# Patient Record
Sex: FEMALE | Race: WHITE | NOT HISPANIC OR LATINO | Employment: OTHER | URBAN - NONMETROPOLITAN AREA
[De-identification: names, ages, dates, MRNs, and addresses within clinical notes are randomized per-mention and may not be internally consistent; named-entity substitution may affect disease eponyms.]

---

## 2018-02-15 ENCOUNTER — OFFICE VISIT (OUTPATIENT)
Dept: FAMILY MEDICINE CLINIC | Facility: CLINIC | Age: 63
End: 2018-02-15
Payer: COMMERCIAL

## 2018-02-15 VITALS
HEIGHT: 68 IN | RESPIRATION RATE: 18 BRPM | TEMPERATURE: 100 F | OXYGEN SATURATION: 97 % | SYSTOLIC BLOOD PRESSURE: 150 MMHG | HEART RATE: 78 BPM | BODY MASS INDEX: 38.98 KG/M2 | DIASTOLIC BLOOD PRESSURE: 90 MMHG | WEIGHT: 257.2 LBS

## 2018-02-15 DIAGNOSIS — M15.8 OTHER OSTEOARTHRITIS INVOLVING MULTIPLE JOINTS: ICD-10-CM

## 2018-02-15 DIAGNOSIS — H65.03 BILATERAL ACUTE SEROUS OTITIS MEDIA, RECURRENCE NOT SPECIFIED: ICD-10-CM

## 2018-02-15 DIAGNOSIS — Z00.00 HEALTHCARE MAINTENANCE: Primary | ICD-10-CM

## 2018-02-15 DIAGNOSIS — R73.9 HYPERGLYCEMIA: ICD-10-CM

## 2018-02-15 DIAGNOSIS — E55.9 VITAMIN D DEFICIENCY: ICD-10-CM

## 2018-02-15 DIAGNOSIS — E53.8 VITAMIN B12 DEFICIENCY: ICD-10-CM

## 2018-02-15 DIAGNOSIS — I10 ESSENTIAL HYPERTENSION: ICD-10-CM

## 2018-02-15 DIAGNOSIS — J30.2 CHRONIC SEASONAL ALLERGIC RHINITIS DUE TO OTHER ALLERGEN: ICD-10-CM

## 2018-02-15 DIAGNOSIS — E78.49 OTHER HYPERLIPIDEMIA: ICD-10-CM

## 2018-02-15 DIAGNOSIS — J01.00 ACUTE MAXILLARY SINUSITIS, RECURRENCE NOT SPECIFIED: ICD-10-CM

## 2018-02-15 DIAGNOSIS — E03.9 ACQUIRED HYPOTHYROIDISM: ICD-10-CM

## 2018-02-15 PROCEDURE — 99204 OFFICE O/P NEW MOD 45 MIN: CPT | Performed by: NURSE PRACTITIONER

## 2018-02-15 RX ORDER — AMOXICILLIN AND CLAVULANATE POTASSIUM 875; 125 MG/1; MG/1
1 TABLET, FILM COATED ORAL 2 TIMES DAILY
Qty: 20 TABLET | Refills: 1 | Status: SHIPPED | OUTPATIENT
Start: 2018-02-15 | End: 2018-02-15 | Stop reason: SDUPTHER

## 2018-02-15 RX ORDER — FLUTICASONE PROPIONATE 50 MCG
2 SPRAY, SUSPENSION (ML) NASAL DAILY PRN
Qty: 16 G | Refills: 5 | Status: SHIPPED | OUTPATIENT
Start: 2018-02-15 | End: 2018-02-15 | Stop reason: SDUPTHER

## 2018-02-15 RX ORDER — CALCIUM CARBONATE/VITAMIN D3 600 MG-10
1 TABLET ORAL 2 TIMES DAILY
Refills: 0 | COMMUNITY
Start: 2018-01-24

## 2018-02-15 RX ORDER — ATORVASTATIN CALCIUM 20 MG/1
20 TABLET, FILM COATED ORAL
Refills: 0 | COMMUNITY
Start: 2017-11-13 | End: 2018-02-15 | Stop reason: SDUPTHER

## 2018-02-15 RX ORDER — HYDROCHLOROTHIAZIDE 12.5 MG/1
12.5 CAPSULE, GELATIN COATED ORAL DAILY
Qty: 90 CAPSULE | Refills: 1 | Status: SHIPPED | OUTPATIENT
Start: 2018-02-15 | End: 2018-08-20 | Stop reason: SDUPTHER

## 2018-02-15 RX ORDER — ERGOCALCIFEROL 1.25 MG/1
1 CAPSULE ORAL
Refills: 0 | COMMUNITY
Start: 2018-01-24 | End: 2018-02-15 | Stop reason: SDUPTHER

## 2018-02-15 RX ORDER — ERGOCALCIFEROL 1.25 MG/1
50000 CAPSULE ORAL
Qty: 4 CAPSULE | Refills: 5 | Status: SHIPPED | OUTPATIENT
Start: 2018-02-15 | End: 2018-02-15 | Stop reason: SDUPTHER

## 2018-02-15 RX ORDER — IBUPROFEN 800 MG/1
800 TABLET ORAL EVERY 8 HOURS PRN
Qty: 270 TABLET | Refills: 1 | Status: SHIPPED | OUTPATIENT
Start: 2018-02-15 | End: 2018-08-20 | Stop reason: SDUPTHER

## 2018-02-15 RX ORDER — ASPIRIN 81 MG/1
81 TABLET ORAL DAILY
Qty: 90 TABLET | Refills: 1 | Status: SHIPPED | OUTPATIENT
Start: 2018-02-15 | End: 2018-08-20 | Stop reason: SDUPTHER

## 2018-02-15 RX ORDER — HYDROCHLOROTHIAZIDE 12.5 MG/1
12.5 CAPSULE, GELATIN COATED ORAL DAILY
Qty: 30 CAPSULE | Refills: 5 | Status: SHIPPED | OUTPATIENT
Start: 2018-02-15 | End: 2018-02-15 | Stop reason: SDUPTHER

## 2018-02-15 RX ORDER — CYANOCOBALAMIN (VITAMIN B-12) 1000 MCG
1000 TABLET ORAL EVERY OTHER DAY
Qty: 90 TABLET | Refills: 1 | Status: SHIPPED | OUTPATIENT
Start: 2018-02-15 | End: 2018-02-15 | Stop reason: SDUPTHER

## 2018-02-15 RX ORDER — ATORVASTATIN CALCIUM 20 MG/1
20 TABLET, FILM COATED ORAL
Qty: 90 TABLET | Refills: 1 | Status: SHIPPED | OUTPATIENT
Start: 2018-02-15 | End: 2018-02-15 | Stop reason: SDUPTHER

## 2018-02-15 RX ORDER — VERAPAMIL HYDROCHLORIDE 120 MG/1
120 CAPSULE, EXTENDED RELEASE ORAL DAILY
Qty: 30 CAPSULE | Refills: 5 | Status: SHIPPED | OUTPATIENT
Start: 2018-02-15 | End: 2018-02-15 | Stop reason: SDUPTHER

## 2018-02-15 RX ORDER — FLUTICASONE PROPIONATE 50 MCG
2 SPRAY, SUSPENSION (ML) NASAL DAILY PRN
Refills: 0 | COMMUNITY
Start: 2017-12-01 | End: 2018-02-15 | Stop reason: SDUPTHER

## 2018-02-15 RX ORDER — VERAPAMIL HYDROCHLORIDE 120 MG/1
1 CAPSULE, EXTENDED RELEASE ORAL DAILY
Refills: 0 | COMMUNITY
Start: 2018-02-04 | End: 2018-02-15 | Stop reason: SDUPTHER

## 2018-02-15 RX ORDER — LEVOTHYROXINE SODIUM 112 UG/1
1 TABLET ORAL DAILY
COMMUNITY
Start: 2017-11-16 | End: 2018-02-15 | Stop reason: SDUPTHER

## 2018-02-15 RX ORDER — IBUPROFEN 800 MG/1
800 TABLET ORAL EVERY 8 HOURS PRN
Qty: 270 TABLET | Refills: 1 | Status: SHIPPED | OUTPATIENT
Start: 2018-02-15 | End: 2018-02-15 | Stop reason: SDUPTHER

## 2018-02-15 RX ORDER — LEVOTHYROXINE SODIUM 112 UG/1
112 TABLET ORAL DAILY
Qty: 90 TABLET | Refills: 1 | Status: SHIPPED | OUTPATIENT
Start: 2018-02-15 | End: 2018-02-15 | Stop reason: SDUPTHER

## 2018-02-15 RX ORDER — CYANOCOBALAMIN (VITAMIN B-12) 1000 MCG
1 TABLET ORAL EVERY OTHER DAY
Refills: 0 | COMMUNITY
Start: 2018-02-04 | End: 2018-02-15 | Stop reason: SDUPTHER

## 2018-02-15 RX ORDER — FLUTICASONE PROPIONATE 50 MCG
2 SPRAY, SUSPENSION (ML) NASAL DAILY PRN
Qty: 16 G | Refills: 5 | Status: SHIPPED | OUTPATIENT
Start: 2018-02-15 | End: 2018-08-20 | Stop reason: SDUPTHER

## 2018-02-15 RX ORDER — AMOXICILLIN AND CLAVULANATE POTASSIUM 875; 125 MG/1; MG/1
1 TABLET, FILM COATED ORAL EVERY 12 HOURS SCHEDULED
Qty: 20 TABLET | Refills: 1 | Status: SHIPPED | OUTPATIENT
Start: 2018-02-15 | End: 2018-02-25

## 2018-02-15 RX ORDER — CYANOCOBALAMIN (VITAMIN B-12) 1000 MCG
1000 TABLET ORAL EVERY OTHER DAY
Qty: 90 TABLET | Refills: 1 | Status: SHIPPED | OUTPATIENT
Start: 2018-02-15 | End: 2018-08-20 | Stop reason: SDUPTHER

## 2018-02-15 RX ORDER — ERGOCALCIFEROL 1.25 MG/1
50000 CAPSULE ORAL
Qty: 12 CAPSULE | Refills: 1 | Status: SHIPPED | OUTPATIENT
Start: 2018-02-15 | End: 2018-08-20 | Stop reason: SDUPTHER

## 2018-02-15 RX ORDER — ATORVASTATIN CALCIUM 20 MG/1
20 TABLET, FILM COATED ORAL
Qty: 90 TABLET | Refills: 1 | Status: SHIPPED | OUTPATIENT
Start: 2018-02-15 | End: 2018-08-20 | Stop reason: SDUPTHER

## 2018-02-15 RX ORDER — VERAPAMIL HYDROCHLORIDE 120 MG/1
120 CAPSULE, EXTENDED RELEASE ORAL DAILY
Qty: 90 CAPSULE | Refills: 1 | Status: SHIPPED | OUTPATIENT
Start: 2018-02-15 | End: 2018-08-20 | Stop reason: SDUPTHER

## 2018-02-15 RX ORDER — LEVOTHYROXINE SODIUM 112 UG/1
112 TABLET ORAL DAILY
Qty: 90 TABLET | Refills: 1 | Status: SHIPPED | OUTPATIENT
Start: 2018-02-15 | End: 2018-08-17 | Stop reason: SDUPTHER

## 2018-02-15 RX ORDER — HYDROCHLOROTHIAZIDE 12.5 MG/1
1 CAPSULE, GELATIN COATED ORAL DAILY
Refills: 0 | COMMUNITY
Start: 2018-01-06 | End: 2018-02-15 | Stop reason: SDUPTHER

## 2018-02-15 RX ORDER — IBUPROFEN 800 MG/1
1 TABLET ORAL EVERY 8 HOURS PRN
Refills: 0 | COMMUNITY
Start: 2017-12-01 | End: 2018-02-15 | Stop reason: SDUPTHER

## 2018-02-15 RX ORDER — ASPIRIN 81 MG/1
81 TABLET ORAL DAILY
Qty: 30 TABLET | Refills: 0 | Status: SHIPPED | OUTPATIENT
Start: 2018-02-15 | End: 2018-02-15 | Stop reason: SDUPTHER

## 2018-02-15 NOTE — PATIENT INSTRUCTIONS
Low-Sodium Diet   AMBULATORY CARE:   A low-sodium diet  limits foods that are high in sodium (salt)  You will need to follow a low-sodium diet if you have high blood pressure, kidney disease, or heart failure  You may also need to follow this diet if you have a condition that is causing your body to retain (hold) extra fluid  You may need to limit the amount of sodium you eat to 1,500 mg  Ask your healthcare provider how much sodium you can have each day  How to use food labels to choose foods that are low in sodium:  Read food labels to find the amount of sodium they contain  The amount of sodium is listed in milligrams (mg)  The % Daily Value (DV) column tells you how much of your daily needs are met by 1 serving of the food for each nutrient listed  Choose foods that have less than 5% of the DV of sodium  These foods are considered low in sodium  Foods that have 20% or more of the DV of sodium are considered high in sodium  Some food labels may also list any of the following terms that tell you about the sodium content in the food:  · Sodium-free:  Less than 5 mg in each serving    · Very low sodium:  35 mg of sodium or less in each serving    · Low sodium:  140 mg of sodium or less in each serving    · Reduced sodium: At least 25% less sodium in each serving than the regular type    · Light in sodium:  50% less sodium in each serving    · Unsalted or no added salt:  No extra salt is added during processing (the food may still contain sodium)  Foods to avoid:  Salty foods are high in sodium   You should avoid the following:  · Processed foods:      ¨ Mixes for cornbread, biscuits, cake, and pudding     ¨ Instant foods, such as potatoes, cereals, noodles, and rice     ¨ Packaged foods, such as bread stuffing, rice and pasta mixes, snack dip mixes, and macaroni and cheese     ¨ Canned foods, such as canned vegetables, soups, broths, sauces, and vegetable or tomato juice    ¨ Snack foods, such as salted chips, popcorn, pretzels, pork rinds, salted crackers, and salted nuts    ¨ Frozen foods, such as dinners, entrees, vegetables with sauces, and breaded meats    ¨ Sauerkraut, pickled vegetables, and other foods prepared in brine    · Meats and cheeses:      ¨ Smoked or cured meat, such as corned beef, [place], ham, hot dogs, and sausage    ¨ Canned meats or spreads, such as potted meats, sardines, anchovies, and imitation seafood    ¨ Deli or lunch meats, such as bologna, ham, turkey, and roast beef    ¨ Processed cheese, such as American cheese and cheese spreads    · Condiments, sauces, and seasonings:      ¨ Salt (¼ teaspoon of salt contains 575 mg of sodium)    ¨ Seasonings made with salt, such as garlic salt, celery salt, onion salt, and seasoned salt    ¨ Regular soy sauce, barbecue sauce, teriyaki sauce, steak sauce, Worcestershire sauce, and most flavored vinegars    ¨ Canned gravy and mixes     ¨ Regular condiments, such as mustard, ketchup, and salad dressings    ¨ Pickles and olives    ¨ Meat tenderizers and monosodium glutamate (MSG)  Foods to include:  Read the food label to find the exact amount of sodium in each serving  · Bread and cereal:  Try to choose breads with less than 80 mg of sodium per serving  ¨ Bread, roll, taz, tortilla, or unsalted crackers  ¨ Ready-to-eat cereals with less than 5% DV of sodium (examples include shredded wheat and puffed rice)    ¨ Pasta    · Vegetables and fruits:      ¨ Unsalted fresh, frozen, or canned vegetables    ¨ Fresh, frozen, or canned fruits    ¨ Fruit juice    · Dairy:  One serving has about 150 mg of sodium  ¨ Milk, all types    ¨ Yogurt    ¨ Hard cheese, such as cheddar, Swiss, [employer], or mozzarella    · Meat and other protein foods:  Some raw meats may have added sodium       ¨ Plain meats, fish, and poultry     ¨ Eggs    · Other foods:      ¨ Homemade pudding    ¨ Unsalted nuts, popcorn, or pretzels    ¨ Unsalted butter or margarine  Ways to decrease sodium:   · Add spices and herbs to foods instead of salt during cooking  Use salt-free seasonings to add flavor to foods  Examples include onion powder, garlic powder, basil, prater powder, paprika, and parsley  Try lemon or lime juice or vinegar to give foods a tart flavor  Use hot peppers, pepper, or cayenne pepper to add a spicy flavor to foods  · Do not keep a salt shaker at your kitchen table  This may help keep you from adding salt to food at the table  It may take time to get used to enjoying the natural flavor of food instead of adding salt  Talk to your healthcare provider before you use salt substitutes  Some salt substitutes have a high amount of potassium and need to be avoided if you have kidney disease  · Choose low-sodium foods at restaurants  Meals from restaurants are often high in sodium  Some restaurants have nutrition information on the menu that tells you the amount of sodium in their foods  If possible, ask for your food to be prepared with less, or no salt  · Shop for unsalted or low-sodium foods and snacks at the grocery store  Examples include unsalted or low-sodium broths, soups, and canned vegetables  Choose fresh or frozen vegetables instead  Choose unsalted nuts or seeds or fresh fruits or vegetables as snacks  Read food labels and choose salt-free, very low-sodium, or low-sodium foods  © 2017 [employer] Information is for End User's use only and may not be sold, redistributed or otherwise used for commercial purposes  All illustrations and images included in CareNotes® are the copyrighted property of [employer]  or [employer]  The above information is an  only  It is not intended as medical advice for individual conditions or treatments  Talk to your doctor, nurse or pharmacist before following any medical regimen to see if it is safe and effective for you    Wellness Visit for Adults   AMBULATORY CARE:   A wellness visit  is when you see your healthcare provider to get screened for health problems  You can also get advice on how to stay healthy  Write down your questions so you remember to ask them  Ask your healthcare provider how often you should have a wellness visit  What happens at a wellness visit:  Your healthcare provider will ask about your health, and your family history of health problems  This includes high blood pressure, heart disease, and cancer  He or she will ask if you have symptoms that concern you, if you smoke, and about your mood  You may also be asked about your intake of medicines, supplements, food, and alcohol  Any of the following may be done:  · Your weight  will be checked  Your height may also be checked so your body mass index (BMI) can be calculated  Your BMI shows if you are at a healthy weight  · Your blood pressure  and heart rate will be checked  Your temperature may also be checked  · Blood and urine tests  may be done  Blood tests may be done to check your cholesterol levels  Abnormal cholesterol levels increase your risk for heart disease and stroke  You may also need a blood or urine test to check for diabetes if you are at increased risk  Urine tests may be done to look for signs of an infection or kidney disease  · A physical exam  includes checking your heartbeat and lungs with a stethoscope  Your healthcare provider may also check your skin to look for sun damage  · Screening tests  may be recommended  A screening test is done to check for diseases that may not cause symptoms  The screening tests you may need depend on your age, gender, family history, and lifestyle habits  For example, colorectal screening may be recommended if you are 48years old or older  Screening tests you need if you are a woman:   · A Pap smear  is used to screen for cervical cancer  Pap smears are usually done every 3 to 5 years depending on your age   You may need them more often if you have had abnormal Pap smear test results in the past  Ask your healthcare provider how often you should have a Pap smear  · A mammogram  is an x-ray of your breasts to screen for breast cancer  Experts recommend mammograms every 2 years starting at age 48 years  You may need a mammogram at age 52 years or younger if you have an increased risk for breast cancer  Talk to your healthcare provider about when you should start having mammograms and how often you need them  Vaccines you may need:   · Get an influenza vaccine  every year  The influenza vaccine protects you from the flu  Several types of viruses cause the flu  The viruses change over time, so new vaccines are made each year  · Get a tetanus-diphtheria (Td) booster vaccine  every 10 years  This vaccine protects you against tetanus and diphtheria  Tetanus is a severe infection that may cause painful muscle spasms and lockjaw  Diphtheria is a severe bacterial infection that causes a thick covering in the back of your mouth and throat  · Get a human papillomavirus (HPV) vaccine  if you are female and aged 23 to 32 or male 23 to 24 and never received it  This vaccine protects you from HPV infection  HPV is the most common infection spread by sexual contact  HPV may also cause vaginal, penile, and anal cancers  · Get a pneumococcal vaccine  if you are aged 72 years or older  The pneumococcal vaccine is an injection given to protect you from pneumococcal disease  Pneumococcal disease is an infection caused by pneumococcal bacteria  The infection may cause pneumonia, meningitis, or an ear infection  · Get a shingles vaccine  if you are aged 61 or older, even if you have had shingles before  The shingles vaccine is an injection to protect you from the varicella-zoster virus  This is the same virus that causes chickenpox  Shingles is a painful rash that develops in people who had chickenpox or have been exposed to the virus    How to eat healthy:  My Plate is a model for planning healthy meals  It shows the types and amounts of foods that should go on your plate  Fruits and vegetables make up about half of your plate, and grains and protein make up the other half  A serving of dairy is included on the side of your plate  The amount of calories and serving sizes you need depends on your age, gender, weight, and height  Examples of healthy foods are listed below:  · Eat a variety of vegetables  such as dark green, red, and orange vegetables  You can also include canned vegetables low in sodium (salt) and frozen vegetables without added butter or sauces  · Eat a variety of fresh fruits , canned fruit in 100% juice, frozen fruit, and dried fruit  · Include whole grains  At least half of the grains you eat should be whole grains  Examples include whole-wheat bread, wheat pasta, brown rice, and whole-grain cereals such as oatmeal     · Eat a variety of protein foods such as seafood (fish and shellfish), lean meat, and poultry without skin (turkey and chicken)  Examples of lean meats include pork leg, shoulder, or tenderloin, and beef round, sirloin, tenderloin, and extra lean ground beef  Other protein foods include eggs and egg substitutes, beans, peas, soy products, nuts, and seeds  · Choose low-fat dairy products such as skim or 1% milk or low-fat yogurt, cheese, and cottage cheese  · Limit unhealthy fats  such as butter, hard margarine, and shortening  Exercise:  Exercise at least 30 minutes per day on most days of the week  Some examples of exercise include walking, biking, dancing, and swimming  You can also fit in more physical activity by taking the stairs instead of the elevator or parking farther away from stores  Include muscle strengthening activities 2 days each week  Regular exercise provides many health benefits   It helps you manage your weight, and decreases your risk for type 2 diabetes, heart disease, stroke, and high blood pressure  Exercise can also help improve your mood  Ask your healthcare provider about the best exercise plan for you  General health and safety guidelines:   · Do not smoke  Nicotine and other chemicals in cigarettes and cigars can cause lung damage  Ask your healthcare provider for information if you currently smoke and need help to quit  E-cigarettes or smokeless tobacco still contain nicotine  Talk to your healthcare provider before you use these products  · Limit alcohol  A drink of alcohol is 12 ounces of beer, 5 ounces of wine, or 1½ ounces of liquor  · Lose weight, if needed  Being overweight increases your risk of certain health conditions  These include heart disease, high blood pressure, type 2 diabetes, and certain types of cancer  · Protect your skin  Do not sunbathe or use tanning beds  Use sunscreen with a SPF 15 or higher  Apply sunscreen at least 15 minutes before you go outside  Reapply sunscreen every 2 hours  Wear protective clothing, hats, and sunglasses when you are outside  · Drive safely  Always wear your seatbelt  Make sure everyone in your car wears a seatbelt  A seatbelt can save your life if you are in an accident  Do not use your cell phone when you are driving  This could distract you and cause an accident  Pull over if you need to make a call or send a text message  · Practice safe sex  Use latex condoms if are sexually active and have more than one partner  Your healthcare provider may recommend screening tests for sexually transmitted infections (STIs)  · Wear helmets, lifejackets, and protective gear  Always wear a helmet when you ride a bike or motorcycle, go skiing, or play sports that could cause a head injury  Wear protective equipment when you play sports  Wear a lifejacket when you are on a boat or doing water sports    © 2017 [employer] Information is for End User's use only and may not be sold, redistributed or otherwise used for commercial purposes  All illustrations and images included in CareNotes® are the copyrighted property of [email or web address] , [employer]  or [facility]  The above information is an  only  It is not intended as medical advice for individual conditions or treatments  Talk to your doctor, nurse or pharmacist before following any medical regimen to see if it is safe and effective for you

## 2018-02-15 NOTE — PROGRESS NOTES
Assessment/Plan:      Diagnoses and all orders for this visit:    Healthcare maintenance  -     aspirin (ECOTRIN LOW STRENGTH) 81 mg EC tablet; Take 1 tablet (81 mg total) by mouth daily  -     Coenzyme Q10 (COQ-10) 200 MG CAPS; Take 1 capsule (200 mg total) by mouth daily    Essential hypertension  -     verapamil (VERELAN PM) 120 MG 24 hr capsule; Take 1 capsule (120 mg total) by mouth daily  -     hydrochlorothiazide (MICROZIDE) 12 5 mg capsule; Take 1 capsule (12 5 mg total) by mouth daily    Acquired hypothyroidism  -     levothyroxine 112 mcg tablet; Take 1 tablet (112 mcg total) by mouth daily  -     CBC and differential; Future  -     TSH, 3rd generation with T4 reflex; Future    Other hyperlipidemia  -     atorvastatin (LIPITOR) 20 mg tablet; Take 1 tablet (20 mg total) by mouth daily at bedtime  -     Lipid panel; Future    Vitamin D deficiency  -     ergocalciferol (VITAMIN D2) 50,000 units; Take 1 capsule (50,000 Units total) by mouth every 14 (fourteen) days  -     Vitamin D Panel; Future    Vitamin B12 deficiency  -     KP VITAMIN B-12 1000 MCG tablet; Take 1 tablet (1,000 mcg total) by mouth every other day  -     Vitamin B12; Future    Acute maxillary sinusitis, recurrence not specified  -     amoxicillin-clavulanate (AUGMENTIN) 875-125 mg per tablet; Take 1 tablet by mouth 2 (two) times a day for 10 days  -     fluticasone (FLONASE) 50 mcg/act nasal spray; 2 sprays into each nostril daily as needed for rhinitis or allergies    Bilateral acute serous otitis media, recurrence not specified  -     amoxicillin-clavulanate (AUGMENTIN) 875-125 mg per tablet; Take 1 tablet by mouth 2 (two) times a day for 10 days    Other osteoarthritis involving multiple joints  -     ibuprofen (MOTRIN) 800 mg tablet;  Take 1 tablet (800 mg total) by mouth every 8 (eight) hours as needed for mild pain, moderate pain or headaches    Chronic seasonal allergic rhinitis due to other allergen  -     fluticasone (FLONASE) 50 mcg/act nasal spray; 2 sprays into each nostril daily as needed for rhinitis or allergies    Hyperglycemia  -     CBC and differential; Future  -     Comprehensive metabolic panel; Future  -     Hemoglobin A1c; Future  -     Insulin, fasting; Future    Other orders  -     Discontinue: atorvastatin (LIPITOR) 20 mg tablet; Take 20 mg by mouth daily at bedtime  -     RA CALCIUM 600/VITAMIN D-3 600-400 MG-UNIT TABS; Take 1 tablet by mouth 2 (two) times a day  -     Discontinue:  VITAMIN B-12 1000 MCG tablet; Take 1 tablet by mouth every other day  -     Discontinue: ergocalciferol (VITAMIN D2) 50,000 units; Take 1 capsule by mouth every 14 (fourteen) days  -     Discontinue: fluticasone (FLONASE) 50 mcg/act nasal spray; 2 sprays into each nostril daily as needed  -     Discontinue: hydrochlorothiazide (MICROZIDE) 12 5 mg capsule; Take 1 capsule by mouth daily  -     Discontinue: ibuprofen (MOTRIN) 800 mg tablet; Take 1 tablet by mouth every 8 (eight) hours as needed  -     Discontinue: levothyroxine 112 mcg tablet; Take 1 tablet by mouth daily  -     Discontinue: verapamil (VERELAN PM) 120 MG 24 hr capsule; Take 1 capsule by mouth daily          Subjective:     Patient ID: Kalpana Brown is a 58 y o  female  Patient presents to office for initial physical exam and to establish care as a new patient at [address] [place] [address]  All medications and health history reviewed with patient and tolerating medications well  C/O pain in B/L ears feeling blocked along with sinus congestion for yellow mucous along with productive cough for yellow mucous  Denies any fever or body aches  Patient continues to be followed by Podiatry Dr Danette Carballo for chronic left heel pain which she received a steroid injection for and it relieved her pain so continues to wear compression sock  Review of Systems    GENERAL:  Feels well, denies any significant changes in weight without trying    SKIN:  Denies rashes, lesions, opened areas, wounds, change in moles or any other skin changes  HEENT:  Denies any head injury or headaches  Negative blurred vision, floaters, spots before eyes, infections, or other vision problems  Negative significant changes in vision or hearing  Negative tinnitus, vertigo, or infections  Hx of hay fever symptoms, C/O B/L ear pain with fullness sensation  C/O sinus congestion with yellow nasal discharge, no bloody noses, or problems with smell  Negative sore throat, bleeding gums, ulcers, or sores  Glasses/Contacts: Glasses  Hearing Aids: NO  Dentures/Partials/Implants:   Lower Right Implant and Capped Teeth  NECK:  Denies lumps, goiter, pain, swollen glands, or lymphadenopathy  BREASTS:  Denies lumps, pain, nipple discharge, swelling, redness, or any other changes  RESPIRATORY:  C/O productive cough for yellow mucous, no wheezing, shortness of breath, dyspnea, or orthopnea  CARDIOVASCULAR:  Denies chest pain or palpitations  GASTROINTESTINAL:  Appetite good, denies nausea, vomiting, or indigestion  Bowel movements normal occurring about once daily or every other day  URINARY:  Denies frequency, urgency, incontinence, dysuria, hematuria, nocturia, or recent flank pain  GENITAL:  Denies vaginal discharge, pelvic infection, lesions, ulcers, or pain  Negative dyspareunia or abnormal vaginal bleeding  PERIPHERAL VASCULAR:  Denies varicosities, swelling, skin changes, or pain  MUSCULOSKELETAL:  Denies back, joint, or muscle pain  Negative problems with mobility or movement  PSYCHIATRIC:  Denies problems with depression, anxiety, anger, or other psychiatric symptoms  NEUROLOGIC:  Denies fainting, dizziness, memory problems, seizures, tingling, motor or sensory loss  HEMATOLOGIC:  Denies easy bruising, bleeding, or anemia  ENDOCRINE:  Denies thyroid problems, temperature intolerance, excessive sweating, or other endocrine symptoms       Objective:     Physical Exam   Nursing note and vitals reviewed  GENERAL:  Appears well nourished, well groomed, in no acute distress  SKIN:  Palms warm, dry, color good  Nails without clubbing or cyanosis  No lesions, ulcerations, or wounds  HEAD:  Hair is average texture  Scalp without lesions, normocephalic, and atraumatic  EYES:  Visual fields full by confrontation  Conjunctiva pink, sclera white, PERRLA  Extraocular movements intact  Disc margins sharp, without hemorrhages or exudate  No arteriolar narrowing or A-V nicking  EARS:  B/L ear canals clear  B/L TMs red with serous effusion and decreased light reflex  Acuity good to whispered voice  Green midline  AC>BC  NOSE: Mucosa pink, moist, septum midline  Negative sinus tenderness  B/L turbinates red and edematous with yellow exudate  MOUTH:  Oral mucosa pink  Pharynx pink, moist, without swelling, redness, or exudate  Dentition ok  Tongue midline  NECK:  Supple, trachea midline, Negative thyromegaly, lymphadenopathy, or swollen glands  LYMPH NODES:  Negative enlargement of neck, axillary, epitrochlear, or inguinal nodes  THORAX/LUNGS  Thorax symmetric with good excursion  Lungs resonant  Breath sounds vesicular with no added sounds  Diaphragm descends within normal limits  CARDIOVASCULAR:  Carotid upstrokes brisk and without bruits  Apical impulse discrete and tapping, barely palpable in the 5th ICS/MCL  Normal S1 and Normal S2, Negative S3 or S4  Negative murmurs, thrills, lifts, or heaves  ABDOMEN:  Protuberant, bowel sounds normal active x 4 quadrants  Negative tenderness  Negative masses  Negative hepatomegaly  Negative splenomegaly  Negative costovertebral tenderness  EXTREMITIES:  Warm, calves supple, non-tender, negative for edema  Negative stasis pigmentation or ulcers  +2 pulses throughout  MUSCULOSKELETAL:  Negative joint deformities      Good range of motion in hands, wrists, elbows, shoulders, spine, hips, knees, and ankles  Negative spinal curvature  NEUROLOGICAL:  Mental status:  Awake, alert, and oriented to person, place, time, and event  Normal thought processes  Cranial Nerves:  II-XII intact  Motor:  Good muscle bulk and tone  Strength: 5/5 throughout  Cerebellar:  Rapid alternating movements, point-to-point movements intact  Gait stable and fluid  Sensory:  Pinprick, light touch, position sense, vibration, and stereogenesis intact  Romberg: Negative  Reflexes: +2 throughout

## 2018-04-02 LAB — HBA1C MFR BLD HPLC: 5.4 %

## 2018-05-24 ENCOUNTER — OFFICE VISIT (OUTPATIENT)
Dept: FAMILY MEDICINE CLINIC | Facility: CLINIC | Age: 63
End: 2018-05-24
Payer: COMMERCIAL

## 2018-05-24 ENCOUNTER — TELEPHONE (OUTPATIENT)
Dept: FAMILY MEDICINE CLINIC | Facility: CLINIC | Age: 63
End: 2018-05-24

## 2018-05-24 VITALS
OXYGEN SATURATION: 97 % | WEIGHT: 258.8 LBS | BODY MASS INDEX: 39.22 KG/M2 | HEIGHT: 68 IN | RESPIRATION RATE: 18 BRPM | DIASTOLIC BLOOD PRESSURE: 74 MMHG | SYSTOLIC BLOOD PRESSURE: 126 MMHG | HEART RATE: 68 BPM | TEMPERATURE: 98.8 F

## 2018-05-24 DIAGNOSIS — I10 ESSENTIAL HYPERTENSION: ICD-10-CM

## 2018-05-24 DIAGNOSIS — R05.9 COUGH: ICD-10-CM

## 2018-05-24 DIAGNOSIS — B37.2 CANDIDIASIS OF SKIN: ICD-10-CM

## 2018-05-24 DIAGNOSIS — J18.9 PNEUMONIA OF LEFT LOWER LOBE DUE TO INFECTIOUS ORGANISM: ICD-10-CM

## 2018-05-24 DIAGNOSIS — J18.9 PNEUMONIA OF LEFT LOWER LOBE DUE TO INFECTIOUS ORGANISM: Primary | ICD-10-CM

## 2018-05-24 DIAGNOSIS — E03.9 ACQUIRED HYPOTHYROIDISM: ICD-10-CM

## 2018-05-24 PROCEDURE — 99214 OFFICE O/P EST MOD 30 MIN: CPT | Performed by: NURSE PRACTITIONER

## 2018-05-24 PROCEDURE — 3078F DIAST BP <80 MM HG: CPT | Performed by: NURSE PRACTITIONER

## 2018-05-24 PROCEDURE — 3074F SYST BP LT 130 MM HG: CPT | Performed by: NURSE PRACTITIONER

## 2018-05-24 RX ORDER — KETOCONAZOLE 20 MG/G
CREAM TOPICAL 2 TIMES DAILY PRN
Qty: 60 G | Refills: 3 | Status: SHIPPED | OUTPATIENT
Start: 2018-05-24 | End: 2018-05-24 | Stop reason: SDUPTHER

## 2018-05-24 RX ORDER — ALBUTEROL SULFATE 90 UG/1
AEROSOL, METERED RESPIRATORY (INHALATION)
Qty: 1 INHALER | Refills: 1 | Status: SHIPPED | OUTPATIENT
Start: 2018-05-24 | End: 2018-08-28 | Stop reason: ALTCHOICE

## 2018-05-24 RX ORDER — CLINDAMYCIN HYDROCHLORIDE 300 MG/1
300 CAPSULE ORAL 4 TIMES DAILY
Qty: 40 CAPSULE | Refills: 1 | Status: SHIPPED | OUTPATIENT
Start: 2018-05-24 | End: 2018-06-04 | Stop reason: ALTCHOICE

## 2018-05-24 RX ORDER — METHYLPREDNISOLONE 4 MG/1
TABLET ORAL
Qty: 21 TABLET | Refills: 1 | Status: SHIPPED | OUTPATIENT
Start: 2018-05-24 | End: 2018-06-04 | Stop reason: ALTCHOICE

## 2018-05-24 RX ORDER — PROPRANOLOL/HYDROCHLOROTHIAZID 40 MG-25MG
2 TABLET ORAL DAILY
COMMUNITY
End: 2021-07-19 | Stop reason: ALTCHOICE

## 2018-05-24 RX ORDER — KETOCONAZOLE 20 MG/G
CREAM TOPICAL 2 TIMES DAILY PRN
Qty: 60 G | Refills: 5 | Status: SHIPPED | OUTPATIENT
Start: 2018-05-24 | End: 2018-08-20 | Stop reason: SDUPTHER

## 2018-05-24 RX ORDER — KETOCONAZOLE 20 MG/G
CREAM TOPICAL 2 TIMES DAILY PRN
Qty: 60 G | Refills: 5 | Status: SHIPPED | OUTPATIENT
Start: 2018-05-24 | End: 2018-05-24 | Stop reason: SDUPTHER

## 2018-05-24 RX ORDER — CLINDAMYCIN HYDROCHLORIDE 300 MG/1
300 CAPSULE ORAL 4 TIMES DAILY
Qty: 40 CAPSULE | Refills: 1 | Status: SHIPPED | OUTPATIENT
Start: 2018-05-24 | End: 2018-05-24 | Stop reason: SDUPTHER

## 2018-05-24 RX ORDER — METHYLPREDNISOLONE 4 MG/1
TABLET ORAL
Qty: 21 TABLET | Refills: 1 | Status: SHIPPED | OUTPATIENT
Start: 2018-05-24 | End: 2018-05-24 | Stop reason: SDUPTHER

## 2018-05-24 RX ORDER — ALBUTEROL SULFATE 90 UG/1
AEROSOL, METERED RESPIRATORY (INHALATION)
Qty: 1 INHALER | Refills: 1 | Status: SHIPPED | OUTPATIENT
Start: 2018-05-24 | End: 2018-05-24 | Stop reason: SDUPTHER

## 2018-05-24 RX ORDER — CEFTRIAXONE 500 MG/1
500 INJECTION, POWDER, FOR SOLUTION INTRAMUSCULAR; INTRAVENOUS ONCE
Status: COMPLETED | OUTPATIENT
Start: 2018-05-24 | End: 2018-05-24

## 2018-05-24 RX ORDER — FLUCONAZOLE 150 MG/1
TABLET ORAL
Qty: 2 TABLET | Refills: 1 | Status: SHIPPED | OUTPATIENT
Start: 2018-05-24 | End: 2018-05-31

## 2018-05-24 RX ORDER — FLUCONAZOLE 150 MG/1
TABLET ORAL
Qty: 2 TABLET | Refills: 1 | Status: SHIPPED | OUTPATIENT
Start: 2018-05-24 | End: 2018-05-24 | Stop reason: SDUPTHER

## 2018-05-24 RX ORDER — FLUCONAZOLE 150 MG/1
TABLET ORAL
Qty: 2 TABLET | Refills: 2 | Status: SHIPPED | OUTPATIENT
Start: 2018-05-24 | End: 2018-05-24 | Stop reason: SDUPTHER

## 2018-05-24 RX ORDER — CHOLECALCIFEROL (VITAMIN D3) 125 MCG
2 CAPSULE ORAL DAILY
Qty: 60 CAPSULE | Refills: 0 | Status: SHIPPED | OUTPATIENT
Start: 2018-05-24 | End: 2018-06-23

## 2018-05-24 RX ORDER — ALBUTEROL SULFATE 90 UG/1
AEROSOL, METERED RESPIRATORY (INHALATION)
Qty: 1 INHALER | Refills: 2 | Status: SHIPPED | OUTPATIENT
Start: 2018-05-24 | End: 2018-05-24 | Stop reason: SDUPTHER

## 2018-05-24 RX ADMIN — CEFTRIAXONE 500 MG: 500 INJECTION, POWDER, FOR SOLUTION INTRAMUSCULAR; INTRAVENOUS at 16:01

## 2018-05-24 NOTE — PROGRESS NOTES
Assessment/Plan:      Diagnoses and all orders for this visit:    Pneumonia of left lower lobe due to infectious organism ([address] )  -     XR chest pa & lateral; Future  -     Discontinue: Methylprednisolone 4 MG TBPK; Use as directed on package  -     Discontinue: clindamycin (CLEOCIN) 300 MG capsule; Take 1 capsule (300 mg total) by mouth 4 (four) times a day for 10 days  -     Lactobacillus (PROBIOTIC ACIDOPHILUS) CAPS; Take 2 capsules by mouth daily for 30 days  -     Discontinue: albuterol (VENTOLIN HFA) 90 mcg/act inhaler; 2 puffs INH every 8 hours x 10 days then prn SOB  -     Discontinue: Mometasone Furo-Formoterol Fum (DULERA) 200-5 MCG/ACT AERO; Inhale 2 puffs 2 (two) times a day for 14 days  -     cefTRIAXone (ROCEPHIN) injection 500 mg; Inject 500 mg into the shoulder, thigh, or buttocks once   -     Methylprednisolone 4 MG TBPK; Use as directed on package  -     clindamycin (CLEOCIN) 300 MG capsule; Take 1 capsule (300 mg total) by mouth 4 (four) times a day for 10 days  -     albuterol (VENTOLIN HFA) 90 mcg/act inhaler; 2 puffs INH every 8 hours x 10 days then prn SOB  -     Mometasone Furo-Formoterol Fum (DULERA) 200-5 MCG/ACT AERO; Inhale 2 puffs 2 (two) times a day for 14 days    Cough  -     XR chest pa & lateral; Future  -     Discontinue: Methylprednisolone 4 MG TBPK; Use as directed on package  -     Discontinue: clindamycin (CLEOCIN) 300 MG capsule; Take 1 capsule (300 mg total) by mouth 4 (four) times a day for 10 days  -     Lactobacillus (PROBIOTIC ACIDOPHILUS) CAPS; Take 2 capsules by mouth daily for 30 days  -     Discontinue: albuterol (VENTOLIN HFA) 90 mcg/act inhaler; 2 puffs INH every 8 hours x 10 days then prn SOB  -     Discontinue: Mometasone Furo-Formoterol Fum (DULERA) 200-5 MCG/ACT AERO; Inhale 2 puffs 2 (two) times a day for 14 days  -     Methylprednisolone 4 MG TBPK; Use as directed on package  -     clindamycin (CLEOCIN) 300 MG capsule;  Take 1 capsule (300 mg total) by mouth 4 (four) times a day for 10 days  -     albuterol (VENTOLIN HFA) 90 mcg/act inhaler; 2 puffs INH every 8 hours x 10 days then prn SOB  -     Mometasone Furo-Formoterol Fum (DULERA) 200-5 MCG/ACT AERO; Inhale 2 puffs 2 (two) times a day for 14 days    Candidiasis of skin  -     Discontinue: ketoconazole (NIZORAL) 2 % cream; Apply topically 2 (two) times a day as needed for itching, irritation or rash  -     Discontinue: fluconazole (DIFLUCAN) 150 mg tablet; 1 tab po today and repeat every 3 days for 2 doses  -     ketoconazole (NIZORAL) 2 % cream; Apply topically 2 (two) times a day as needed for itching, irritation or rash  -     fluconazole (DIFLUCAN) 150 mg tablet; 1 tab po today and repeat every 3 days for 2 doses    Essential hypertension    Acquired hypothyroidism    Other orders  -     Discontinue: Turmeric 1053 MG TABS; Take by mouth  -     Turmeric 500 MG CAPS; Take 2 capsules by mouth daily          Subjective:     Patient ID: Kaycee Virk is a 58 y o  female  Patient presents to office with C/O has a productive cough for yellow mucous, chest burning, pain in the ribs, nasal congestion for yellow mucous, PND, and B/L ears feeling blocked ongoing for past week  Patient has used Flonase NS but has not worked  Patient C/O having headaches for the past week  Denies fever, chills, or any other symptoms  Complete medical history and medications reviewed with patient and tolerating all medications well without any problems  Patient is requesting a Rx for Ketoconazole Cream for her fungal infection under her abdominal fold  Patient is requesting a Rx for Diflucan to have on hand since she will be taking Medrol Dose pack and Clindamycin in the event she develops a vaginal yeast infection  Review of Systems    GENERAL:  Feels well, denies any significant changes in weight without trying  SKIN:  Patient is requesting a Rx for Ketoconazole Cream for her fungal infection under her abdominal fold  Patient is requesting a Rx for Diflucan to have on hand since she will be taking Medrol Dose pack and Clindamycin in the event she develops a vaginal yeast infection  Denies rashes, lesions, opened areas, wounds, change in moles or any other skin changes  HEENT:  Denies any head injury or headaches  Negative blurred vision, floaters, spots before eyes, infections, or other vision problems  Negative significant changes in vision or hearing  Negative tinnitus, vertigo, or infections  Hx of hay fever symptoms, C/O B/L ear pain with fullness sensation  C/O sinus congestion with yellow nasal discharge, no bloody noses, or problems with smell  C/O PND, Negative sore throat, bleeding gums, ulcers, or sores  Glasses/Contacts: Glasses  Hearing Aids: NO  Dentures/Partials/Implants:   Lower Right Implant and Capped Teeth  NECK:  Denies lumps, goiter, pain, swollen glands, or lymphadenopathy  BREASTS:  Denies lumps, pain, nipple discharge, swelling, redness, or any other changes  RESPIRATORY:  C/O productive cough for yellow mucous, no wheezing, shortness of breath, dyspnea, or orthopnea  C/O pain in ribs from coughing  CARDIOVASCULAR:  Denies chest pain or palpitations  GASTROINTESTINAL:  Appetite good, denies nausea, vomiting, or indigestion  Bowel movements normal occurring about once daily or every other day  URINARY:  Denies frequency, urgency, incontinence, dysuria, hematuria, nocturia, or recent flank pain  GENITAL:  Denies vaginal discharge, pelvic infection, lesions, ulcers, or pain  Negative dyspareunia or abnormal vaginal bleeding  PERIPHERAL VASCULAR:  Denies varicosities, swelling, skin changes, or pain  MUSCULOSKELETAL:  Denies back, joint, or muscle pain  Negative problems with mobility or movement  PSYCHIATRIC:  Denies problems with depression, anxiety, anger, or other psychiatric symptoms    NEUROLOGIC:  Denies fainting, dizziness, memory problems, seizures, tingling, motor or sensory loss  HEMATOLOGIC:  Denies easy bruising, bleeding, or anemia  ENDOCRINE:  Denies thyroid problems, temperature intolerance, excessive sweating, or other endocrine symptoms       Objective:     Physical Exam   Nursing note and vitals reviewed  GENERAL:  Appears well nourished, well groomed, in no acute distress  SKIN:  Palms warm, dry, color good  Nails without clubbing or cyanosis  No lesions, ulcerations, or wounds  HEAD:  Hair is average texture  Scalp without lesions, normocephalic, and atraumatic  EYES:  Visual fields full by confrontation  Conjunctiva pink, sclera white, PERRLA  Extraocular movements intact  Disc margins sharp, without hemorrhages or exudate  No arteriolar narrowing or A-V nicking  EARS:  B/L ear canals clear  B/L TMs red with serous effusion and decreased light reflex  Acuity good to whispered voice  Green midline  AC>BC  NOSE: Mucosa pink, moist, septum midline  + sinus tenderness  B/L turbinates red and edematous with yellow exudate  MOUTH:  Oral mucosa pink  Pharynx red with PND  Dentition ok  Tongue midline  NECK:  Supple, trachea midline, Negative thyromegaly, lymphadenopathy, or swollen glands  LYMPH NODES:  Negative enlargement of neck, axillary, epitrochlear, or inguinal nodes  THORAX/LUNGS  Thorax symmetric with good excursion  Lungs resonant  Breath sounds wheezing, diminished, and crackles of left lower base  Diaphragm descends within normal limits  CARDIOVASCULAR:  Carotid upstrokes brisk and without bruits  Apical impulse discrete and tapping, barely palpable in the 5th ICS/MCL  Normal S1 and Normal S2, Negative S3 or S4  Negative murmurs, thrills, lifts, or heaves  ABDOMEN:  Protuberant, bowel sounds normal active x 4 quadrants  Negative tenderness  Negative masses  Negative hepatomegaly  Negative splenomegaly  Negative costovertebral tenderness    EXTREMITIES:  Warm, calves supple, non-tender, negative for edema     Negative stasis pigmentation or ulcers  +2 pulses throughout  MUSCULOSKELETAL:  Negative joint deformities  Good range of motion in hands, wrists, elbows, shoulders, spine, hips, knees, and ankles  Negative spinal curvature  NEUROLOGICAL:  Mental status:  Awake, alert, and oriented to person, place, time, and event  Normal thought processes  Cranial Nerves:  II-XII intact  Motor:  Good muscle bulk and tone  Strength: 5/5 throughout  Cerebellar:  Rapid alternating movements, point-to-point movements intact  Gait stable and fluid  Sensory:  Pinprick, light touch, position sense, vibration, and stereogenesis intact      Romberg: Negative  Reflexes: +2 throughout

## 2018-05-24 NOTE — PATIENT INSTRUCTIONS
Bacterial Pneumonia   AMBULATORY CARE:   Bacterial pneumonia  is a lung infection caused by bacteria  Your lungs become inflamed and cannot work well  Bacterial pneumonia germs are easily spread when an infected person coughs, sneezes, or has close contact with others  Common symptoms include the following:   · Dry cough or coughing up mucus, which may be streaked with blood    · Fever or chills    · Shortness of breath, wheezing, or chest pain    · Feeling tired easily    · Fast heartbeat    · Headache, muscle pain, or abdominal pain or discomfort    · Trouble thinking clearly  Seek care immediately if:   · You are confused and cannot think clearly  · You are urinating less or not at all  · You cough up blood  · You have more trouble breathing, or your breathing seems faster than normal     · Your heart or pulse beats more than 100 times in 1 minute  · Your lips or fingernails turn blue  Contact your healthcare provider if:   · Your symptoms are the same or get worse 48 hours after you start antibiotics  · You cannot eat or have loss of appetite, nausea, or are vomiting  · You have questions or concerns about your condition or care  Treatment  depends on what caused your bacterial pneumonia and how bad your symptoms are  You may need any of the following:  · Antibiotics  help treat a bacterial infection  · Acetaminophen  decreases pain and fever  It is available without a doctor's order  Ask how much to take and how often to take it  Follow directions  Read the labels of all other medicines you are using to see if they also contain acetaminophen, or ask your doctor or pharmacist  Acetaminophen can cause liver damage if not taken correctly  Do not use more than 4 grams (4,000 milligrams) total of acetaminophen in one day  · NSAIDs , such as ibuprofen, help decrease swelling, pain, and fever  This medicine is available with or without a doctor's order   NSAIDs can cause stomach bleeding or kidney problems in certain people  If you take blood thinner medicine, always ask your healthcare provider if NSAIDs are safe for you  Always read the medicine label and follow directions  · Airway clearance techniques  are exercises to help remove mucus so you can breathe more easily  Your healthcare provider will show you how to do the exercises  These exercises may be used along with machines or devices to help decrease your symptoms  · Respiratory support  is given to help you breathe  You may receive oxygen to increase the level of oxygen in your blood  You may also need a machine to help you breathe  Manage your symptoms:   · Rest as needed  Rest often while you recover  Slowly start to do more each day  · Drink liquids as directed  Ask how much liquid to drink each day and which liquids are best for you  Liquids help thin your mucus, which may make it easier for you to cough it up  · Do not smoke  Avoid secondhand smoke  Smoking increases your risk for pneumonia  Smoking also makes it harder for you to get better after you have had pneumonia  Ask your healthcare provider for information if you currently smoke and need help to quit  E-cigarettes or smokeless tobacco still contain nicotine  Talk to your healthcare provider before you use these products  · Use a cool mist humidifier  to increase air moisture in your home  This may make it easier for you to breathe and help decrease your cough  · Keep your head elevated  You may be able to breathe better if you lie down with the head of your bed up  Prevent bacterial pneumonia:   · Prevent the spread of germs  Wash your hands often with soap and water  Use gel hand cleanser when there is no soap and water available  Do not touch your eyes, nose, or mouth unless you have washed your hands first  Cover your mouth when you cough  Cough into a tissue or your shirtsleeve so you do not spread germs from your hands   If you are sick, stay away from others as much as possible  · Limit alcohol  Women should limit alcohol to 1 drink a day  Men should limit alcohol to 2 drinks a day  A drink of alcohol is 12 ounces of beer, 5 ounces of wine, or 1½ ounces of liquor  · Ask about vaccines  You may need a vaccine to help prevent pneumonia  Get an influenza (flu) vaccine every year as soon as it becomes available  Follow up with your healthcare provider as directed:  Write down your questions so you remember to ask them during your visits  © 2017 [facility] Information is for End User's use only and may not be sold, redistributed or otherwise used for commercial purposes  All illustrations and images included in CareNotes® are the copyrighted property of [employer] M [employer]  or [employer]  The above information is an  only  It is not intended as medical advice for individual conditions or treatments  Talk to your doctor, nurse or pharmacist before following any medical regimen to see if it is safe and effective for you  Wellness Visit for Adults   WHAT YOU NEED TO KNOW:   What is a wellness visit? A wellness visit is when you see your healthcare provider to get screened for health problems  You can also get advice on how to stay healthy  Write down your questions so you remember to ask them  Ask your healthcare provider how often you should have a wellness visit  What happens at a wellness visit? Your healthcare provider will ask about your health, and your family history of health problems  This includes high blood pressure, heart disease, and cancer  He or she will ask if you have symptoms that concern you, if you smoke, and about your mood  You may also be asked about your intake of medicines, supplements, food, and alcohol  Any of the following may be done:  · Your weight  will be checked  Your height may also be checked so your body mass index (BMI) can be calculated   Your BMI shows if you are at a healthy weight  · Your blood pressure  and heart rate will be checked  Your temperature may also be checked  · Blood and urine tests  may be done  Blood tests may be done to check your cholesterol levels  Abnormal cholesterol levels increase your risk for heart disease and stroke  You may also need a blood or urine test to check for diabetes if you are at increased risk  Urine tests may be done to look for signs of an infection or kidney disease  · A physical exam  includes checking your heartbeat and lungs with a stethoscope  Your healthcare provider may also check your skin to look for sun damage  · Screening tests  may be recommended  A screening test is done to check for diseases that may not cause symptoms  The screening tests you may need depend on your age, gender, family history, and lifestyle habits  For example, colorectal screening may be recommended if you are 48years old or older  What screening tests do I need if I am a woman? · A Pap smear  is used to screen for cervical cancer  Pap smears are usually done every 3 to 5 years depending on your age  You may need them more often if you have had abnormal Pap smear test results in the past  Ask your healthcare provider how often you should have a Pap smear  · A mammogram  is an x-ray of your breasts to screen for breast cancer  Experts recommend mammograms every 2 years starting at age 48 years  You may need a mammogram at age 52 years or younger if you have an increased risk for breast cancer  Talk to your healthcare provider about when you should start having mammograms and how often you need them  What vaccines might I need? · Get an influenza vaccine  every year  The influenza vaccine protects you from the flu  Several types of viruses cause the flu  The viruses change over time, so new vaccines are made each year  · Get a tetanus-diphtheria (Td) booster vaccine  every 10 years   This vaccine protects you against tetanus and diphtheria  Tetanus is a severe infection that may cause painful muscle spasms and lockjaw  Diphtheria is a severe bacterial infection that causes a thick covering in the back of your mouth and throat  · Get a human papillomavirus (HPV) vaccine  if you are female and aged 23 to 32 or male 23 to 24 and never received it  This vaccine protects you from HPV infection  HPV is the most common infection spread by sexual contact  HPV may also cause vaginal, penile, and anal cancers  · Get a pneumococcal vaccine  if you are aged 72 years or older  The pneumococcal vaccine is an injection given to protect you from pneumococcal disease  Pneumococcal disease is an infection caused by pneumococcal bacteria  The infection may cause pneumonia, meningitis, or an ear infection  · Get a shingles vaccine  if you are aged 61 or older, even if you have had shingles before  The shingles vaccine is an injection to protect you from the varicella-zoster virus  This is the same virus that causes chickenpox  Shingles is a painful rash that develops in people who had chickenpox or have been exposed to the virus  How can I eat healthy? My Plate is a model for planning healthy meals  It shows the types and amounts of foods that should go on your plate  Fruits and vegetables make up about half of your plate, and grains and protein make up the other half  A serving of dairy is included on the side of your plate  The amount of calories and serving sizes you need depends on your age, gender, weight, and height  Examples of healthy foods are listed below:  · Eat a variety of vegetables  such as dark green, red, and orange vegetables  You can also include canned vegetables low in sodium (salt) and frozen vegetables without added butter or sauces  · Eat a variety of fresh fruits , canned fruit in 100% juice, frozen fruit, and dried fruit  · Include whole grains    At least half of the grains you eat should be whole grains  Examples include whole-wheat bread, wheat pasta, brown rice, and whole-grain cereals such as oatmeal     · Eat a variety of protein foods such as seafood (fish and shellfish), lean meat, and poultry without skin (turkey and chicken)  Examples of lean meats include pork leg, shoulder, or tenderloin, and beef round, sirloin, tenderloin, and extra lean ground beef  Other protein foods include eggs and egg substitutes, beans, peas, soy products, nuts, and seeds  · Choose low-fat dairy products such as skim or 1% milk or low-fat yogurt, cheese, and cottage cheese  · Limit unhealthy fats  such as butter, hard margarine, and shortening  How much exercise do I need? Exercise at least 30 minutes per day on most days of the week  Some examples of exercise include walking, biking, dancing, and swimming  You can also fit in more physical activity by taking the stairs instead of the elevator or parking farther away from stores  Include muscle strengthening activities 2 days each week  Regular exercise provides many health benefits  It helps you manage your weight, and decreases your risk for type 2 diabetes, heart disease, stroke, and high blood pressure  Exercise can also help improve your mood  Ask your healthcare provider about the best exercise plan for you  What are some general health and safety guidelines I should follow? · Do not smoke  Nicotine and other chemicals in cigarettes and cigars can cause lung damage  Ask your healthcare provider for information if you currently smoke and need help to quit  E-cigarettes or smokeless tobacco still contain nicotine  Talk to your healthcare provider before you use these products  · Limit alcohol  A drink of alcohol is 12 ounces of beer, 5 ounces of wine, or 1½ ounces of liquor  · Lose weight, if needed  Being overweight increases your risk of certain health conditions   These include heart disease, high blood pressure, type 2 diabetes, and certain types of cancer  · Protect your skin  Do not sunbathe or use tanning beds  Use sunscreen with a SPF 15 or higher  Apply sunscreen at least 15 minutes before you go outside  Reapply sunscreen every 2 hours  Wear protective clothing, hats, and sunglasses when you are outside  · Drive safely  Always wear your seatbelt  Make sure everyone in your car wears a seatbelt  A seatbelt can save your life if you are in an accident  Do not use your cell phone when you are driving  This could distract you and cause an accident  Pull over if you need to make a call or send a text message  · Practice safe sex  Use latex condoms if are sexually active and have more than one partner  Your healthcare provider may recommend screening tests for sexually transmitted infections (STIs)  · Wear helmets, lifejackets, and protective gear  Always wear a helmet when you ride a bike or motorcycle, go skiing, or play sports that could cause a head injury  Wear protective equipment when you play sports  Wear a lifejacket when you are on a boat or doing water sports  CARE AGREEMENT:   You have the right to help plan your care  Learn about your health condition and how it may be treated  Discuss treatment options with your caregivers to decide what care you want to receive  You always have the right to refuse treatment  The above information is an  only  It is not intended as medical advice for individual conditions or treatments  Talk to your doctor, nurse or pharmacist before following any medical regimen to see if it is safe and effective for you  © 2017 [address] Information is for End User's use only and may not be sold, redistributed or otherwise used for commercial purposes  All illustrations and images included in CareNotes® are the copyrighted property of [employer] D [employer] M [employer]  or [address]

## 2018-06-04 ENCOUNTER — OFFICE VISIT (OUTPATIENT)
Dept: FAMILY MEDICINE CLINIC | Facility: CLINIC | Age: 63
End: 2018-06-04
Payer: COMMERCIAL

## 2018-06-04 VITALS
SYSTOLIC BLOOD PRESSURE: 116 MMHG | WEIGHT: 261 LBS | DIASTOLIC BLOOD PRESSURE: 60 MMHG | RESPIRATION RATE: 18 BRPM | TEMPERATURE: 99.7 F | BODY MASS INDEX: 39.68 KG/M2 | HEART RATE: 79 BPM | OXYGEN SATURATION: 98 %

## 2018-06-04 DIAGNOSIS — K57.92 DIVERTICULITIS: ICD-10-CM

## 2018-06-04 DIAGNOSIS — K21.00 GASTROESOPHAGEAL REFLUX DISEASE WITH ESOPHAGITIS: Primary | ICD-10-CM

## 2018-06-04 DIAGNOSIS — R11.0 NAUSEA: ICD-10-CM

## 2018-06-04 DIAGNOSIS — M54.50 ACUTE BILATERAL LOW BACK PAIN WITHOUT SCIATICA: ICD-10-CM

## 2018-06-04 DIAGNOSIS — M25.50 ARTHRALGIA, UNSPECIFIED JOINT: ICD-10-CM

## 2018-06-04 PROCEDURE — 99213 OFFICE O/P EST LOW 20 MIN: CPT | Performed by: NURSE PRACTITIONER

## 2018-06-04 RX ORDER — ONDANSETRON 4 MG/1
TABLET, FILM COATED ORAL
Qty: 60 TABLET | Refills: 1 | Status: SHIPPED | OUTPATIENT
Start: 2018-06-04 | End: 2018-08-20 | Stop reason: ALTCHOICE

## 2018-06-04 RX ORDER — RANITIDINE 150 MG/1
TABLET ORAL
Qty: 60 TABLET | Refills: 5 | Status: SHIPPED | OUTPATIENT
Start: 2018-06-04 | End: 2018-08-20 | Stop reason: SDUPTHER

## 2018-06-04 NOTE — PATIENT INSTRUCTIONS
Arthralgia   WHAT YOU NEED TO KNOW:   Arthralgia is pain in one or more joints, with no inflammation  It may be short-term and get better within 6 to 8 weeks  Arthralgia can be an early sign of arthritis  Arthralgia may be caused by a medical condition, such as a hormone disorder or a tumor  It may also be caused by an infection or injury  DISCHARGE INSTRUCTIONS:   Medicines: The following medicines may  be ordered for you:  · Acetaminophen  decreases pain  Ask how much to take and how often to take it  Follow directions  Acetaminophen can cause liver damage if not taken correctly  · NSAIDs  decrease pain and prevent swelling  Ask your healthcare provider which medicine is right for you  Ask how much to take and when to take it  Take as directed  NSAIDs can cause stomach bleeding and kidney problems if not taken correctly  · Pain relief cream  decreases pain  Use this cream as directed  · Take your medicine as directed  Contact your healthcare provider if you think your medicine is not helping or if you have side effects  Tell him of her if you are allergic to any medicine  Keep a list of the medicines, vitamins, and herbs you take  Include the amounts, and when and why you take them  Bring the list or the pill bottles to follow-up visits  Carry your medicine list with you in case of an emergency  Follow up with your healthcare provider or specialist as directed:  Write down your questions so you remember to ask them during your visits  Self-care:   · Apply heat  to help decrease pain  Use a heating pad or heat wrap  Apply heat for 20 to 30 minutes every 2 hours for as many days as directed  · Rest  as much as possible  Avoid activities that cause joint pain  · Apply ice  to help decrease swelling and pain  Ice may also help prevent tissue damage  Use an ice pack, or put crushed ice in a plastic bag   Cover it with a towel and place it on your painful joint for 15 to 20 minutes every hour or as directed  · Support  the joint with a brace or elastic wrap as directed  · Elevate  your joint above the level of your heart as often as you can to help decrease swelling and pain  Prop your painful joint on pillows or blankets to keep it elevated comfortably  · Lose weight  if you are overweight  Extra weight can put pressure on your joints and cause more pain  Ask your healthcare provider how much you should weigh  Ask him to help you create a weight loss plan  · Exercise  regularly to help improve joint movement and to decrease pain  Ask about the best exercise plan for you  Low-impact exercises can help take the pressure off your joints  Examples are walking, swimming, and water aerobics  Physical therapy:  A physical therapist teaches you exercises to help improve movement and strength, and to decrease pain  Ask your healthcare provider if physical therapy is right for you  Contact your healthcare provider or specialist if:   · You have a fever  · You continue to have joint pain that cannot be relieved with heat, ice, or medicine  · You have pain and inflammation around your joint  · You have questions or concerns about your condition or care  Return to the emergency department if:   · You have sudden, severe pain when you move your joint  · You have a fever and shaking chills  · You cannot move your joint  · You lose feeling on the side of your body where you have the painful joint  © 2017 [employer]  Information is for End User's use only and may not be sold, redistributed or otherwise used for commercial purposes  All illustrations and images included in CareNotes® are the copyrighted property of [employer]  or [employer]  The above information is an  only  It is not intended as medical advice for individual conditions or treatments   Talk to your doctor, nurse or pharmacist before following any medical regimen to see if it is safe and effective for you  Acute Nausea and Vomiting   WHAT YOU NEED TO KNOW:   What is acute nausea and vomiting? Acute nausea and vomiting starts suddenly, gets worse quickly, and lasts a short time  What are some common causes of acute nausea and vomiting? · Food poisoning    · Large amounts of alcohol    · Certain medicines, too much of any medicine, or stopping a regular medicine too quickly    · Early stages of pregnancy    · Infection in the stomach, intestines, or other organs    · Trauma to the head    · Anxiety or stress    · Gastroparesis (a condition that prevents your stomach from emptying properly)    · Metabolic disorders, such as uremia or adrenal insufficiency  What causes acute nausea and vomiting with stomach pain? · Inflammation of the appendix, gallbladder, stomach, pancreas, kidneys, or other organs    · Gallstones    · Bacteria or a parasite in the digestive system    · Heart attack    · Stomach ulcers, or bowel blockage or twisting  What causes acute nausea and vomiting with other signs and symptoms? You may be sweating and have pale skin, problems with digestion, and more saliva than usual  These signs and symptoms may be caused by the following:  · Problems with your heart rate, blood flow to your heart muscle, blood pressure, or stomach fluid    · Increased pressure or bleeding in the brain    · Swelling of the tissue covering the brain    · Migraine or seizures    · Inner ear disorders that cause problems with balance  How is the cause of acute nausea and vomiting diagnosed? Your healthcare provider will examine you and ask about your medical history  Tell your provider about other signs and symptoms you have  Also tell your provider when you last had nausea and vomiting and how long it continued  Include if it happened before, during, or after a meal, and how much you ate   Your provider will need to know how much came out when you vomited, and if it was fast and forceful  Tell your provider if your vomit smelled like bowel movement or had blood or food in it  Tell your provider if the vomit was bright yellow  You may need any of the following:  · Blood tests  may be used to check for infection or inflammation  · X-ray, CT, or MRI pictures  may be used to find an injury or blockage  How may acute nausea and vomiting be treated? The first goal of treatment for nausea and vomiting is to prevent or treat dehydration  Treatment also depends on the cause of the nausea and vomiting  Any medical condition causing your nausea and vomiting will also be treated  Treatment is also aimed at stopping or preventing your signs and symptoms  You may need one or more of the following:  · Medicines  may be given to calm your stomach and stop your vomiting  You may also need medicines to help you feel more relaxed or to stop nausea and vomiting caused by motion sickness  Gastrointestinal stimulants may be used to help empty your stomach and bowels  This can help decrease your nausea and vomiting  · IV fluids  may be given to replace lost fluids and electrolytes  This may be needed it you cannot drink liquids  · Nasogastric (NG) tube: An NG tube is put into your nose, and passes down your throat until it reaches your stomach  Food and medicine may be given through an NG tube if you cannot take anything by mouth  The tube may instead be attached to suction if caregivers need to keep your stomach empty  What can I do to prevent or manage acute nausea and vomiting? · Do not drink alcohol  Alcohol may upset or irritate your stomach  Too much alcohol can also cause acute nausea and vomiting  · Control stress  Headaches due to stress may cause nausea and vomiting  Find ways to relax and manage your stress  Get more rest and sleep  · Drink more liquids as directed  Vomiting can lead to dehydration  It is important to drink more liquids to help replace lost body fluids   Ask your healthcare provider how much liquid to drink each day and which liquids are best for you  Your provider may recommend that you drink an oral rehydration solution (ORS)  ORS contains water, salts, and sugar that are needed to replace the lost body fluids  Ask what kind of ORS to use, how much to drink, and where to get it  · Eat smaller meals, more often  Eat small amounts of food every 2 to 3 hours, even if you are not hungry  Food in your stomach may decrease your nausea  · Talk to your healthcare provider before you take over-the-counter (OTC) medicines  These medicines can cause serious problems if you use certain other medicines, or you have a medical condition  You may have problems if you use too much or use them for longer than the label says  Follow directions on the label carefully  When should I seek immediate care? · You see blood in your vomit or your bowel movements  · You have sudden, severe pain in your chest and upper abdomen after hard vomiting or retching  · You have swelling in your neck and chest      · You are dizzy, cold, and thirsty, and your eyes and mouth are dry  · You are urinating very little or not at all  · You have muscle weakness, leg cramps, and trouble breathing  · Your heart is beating much faster than normal     · You continue to vomit for more than 48 hours  When should I contact my healthcare provider? · You have frequent dry heaves (vomiting but nothing comes out)  · Your nausea and vomiting does not get better or go away after you use medicine  · You have questions or concerns about your condition or care  CARE AGREEMENT:   You have the right to help plan your care  Learn about your health condition and how it may be treated  Discuss treatment options with your caregivers to decide what care you want to receive  You always have the right to refuse treatment  The above information is an  only   It is not intended as medical advice for individual conditions or treatments  Talk to your doctor, nurse or pharmacist before following any medical regimen to see if it is safe and effective for you  © 2017 [employer] Information is for End User's use only and may not be sold, redistributed or otherwise used for commercial purposes  All illustrations and images included in CareNotes® are the copyrighted property of [employer]  or [employer]  Wellness Visit for Adults   WHAT YOU NEED TO KNOW:   What is a wellness visit? A wellness visit is when you see your healthcare provider to get screened for health problems  You can also get advice on how to stay healthy  Write down your questions so you remember to ask them  Ask your healthcare provider how often you should have a wellness visit  What happens at a wellness visit? Your healthcare provider will ask about your health, and your family history of health problems  This includes high blood pressure, heart disease, and cancer  He or she will ask if you have symptoms that concern you, if you smoke, and about your mood  You may also be asked about your intake of medicines, supplements, food, and alcohol  Any of the following may be done:  · Your weight  will be checked  Your height may also be checked so your body mass index (BMI) can be calculated  Your BMI shows if you are at a healthy weight  · Your blood pressure  and heart rate will be checked  Your temperature may also be checked  · Blood and urine tests  may be done  Blood tests may be done to check your cholesterol levels  Abnormal cholesterol levels increase your risk for heart disease and stroke  You may also need a blood or urine test to check for diabetes if you are at increased risk  Urine tests may be done to look for signs of an infection or kidney disease  · A physical exam  includes checking your heartbeat and lungs with a stethoscope   Your healthcare provider may also check your skin to look for sun damage  · Screening tests  may be recommended  A screening test is done to check for diseases that may not cause symptoms  The screening tests you may need depend on your age, gender, family history, and lifestyle habits  For example, colorectal screening may be recommended if you are 48years old or older  What screening tests do I need if I am a woman? · A Pap smear  is used to screen for cervical cancer  Pap smears are usually done every 3 to 5 years depending on your age  You may need them more often if you have had abnormal Pap smear test results in the past  Ask your healthcare provider how often you should have a Pap smear  · A mammogram  is an x-ray of your breasts to screen for breast cancer  Experts recommend mammograms every 2 years starting at age 48 years  You may need a mammogram at age 52 years or younger if you have an increased risk for breast cancer  Talk to your healthcare provider about when you should start having mammograms and how often you need them  What vaccines might I need? · Get an influenza vaccine  every year  The influenza vaccine protects you from the flu  Several types of viruses cause the flu  The viruses change over time, so new vaccines are made each year  · Get a tetanus-diphtheria (Td) booster vaccine  every 10 years  This vaccine protects you against tetanus and diphtheria  Tetanus is a severe infection that may cause painful muscle spasms and lockjaw  Diphtheria is a severe bacterial infection that causes a thick covering in the back of your mouth and throat  · Get a human papillomavirus (HPV) vaccine  if you are female and aged 23 to 32 or male 23 to 24 and never received it  This vaccine protects you from HPV infection  HPV is the most common infection spread by sexual contact  HPV may also cause vaginal, penile, and anal cancers  · Get a pneumococcal vaccine  if you are aged 72 years or older   The pneumococcal vaccine is an injection given to protect you from pneumococcal disease  Pneumococcal disease is an infection caused by pneumococcal bacteria  The infection may cause pneumonia, meningitis, or an ear infection  · Get a shingles vaccine  if you are aged 61 or older, even if you have had shingles before  The shingles vaccine is an injection to protect you from the varicella-zoster virus  This is the same virus that causes chickenpox  Shingles is a painful rash that develops in people who had chickenpox or have been exposed to the virus  How can I eat healthy? My Plate is a model for planning healthy meals  It shows the types and amounts of foods that should go on your plate  Fruits and vegetables make up about half of your plate, and grains and protein make up the other half  A serving of dairy is included on the side of your plate  The amount of calories and serving sizes you need depends on your age, gender, weight, and height  Examples of healthy foods are listed below:  · Eat a variety of vegetables  such as dark green, red, and orange vegetables  You can also include canned vegetables low in sodium (salt) and frozen vegetables without added butter or sauces  · Eat a variety of fresh fruits , canned fruit in 100% juice, frozen fruit, and dried fruit  · Include whole grains  At least half of the grains you eat should be whole grains  Examples include whole-wheat bread, wheat pasta, brown rice, and whole-grain cereals such as oatmeal     · Eat a variety of protein foods such as seafood (fish and shellfish), lean meat, and poultry without skin (turkey and chicken)  Examples of lean meats include pork leg, shoulder, or tenderloin, and beef round, sirloin, tenderloin, and extra lean ground beef  Other protein foods include eggs and egg substitutes, beans, peas, soy products, nuts, and seeds  · Choose low-fat dairy products such as skim or 1% milk or low-fat yogurt, cheese, and cottage cheese       · Limit unhealthy fats  such as butter, hard margarine, and shortening  How much exercise do I need? Exercise at least 30 minutes per day on most days of the week  Some examples of exercise include walking, biking, dancing, and swimming  You can also fit in more physical activity by taking the stairs instead of the elevator or parking farther away from stores  Include muscle strengthening activities 2 days each week  Regular exercise provides many health benefits  It helps you manage your weight, and decreases your risk for type 2 diabetes, heart disease, stroke, and high blood pressure  Exercise can also help improve your mood  Ask your healthcare provider about the best exercise plan for you  What are some general health and safety guidelines I should follow? · Do not smoke  Nicotine and other chemicals in cigarettes and cigars can cause lung damage  Ask your healthcare provider for information if you currently smoke and need help to quit  E-cigarettes or smokeless tobacco still contain nicotine  Talk to your healthcare provider before you use these products  · Limit alcohol  A drink of alcohol is 12 ounces of beer, 5 ounces of wine, or 1½ ounces of liquor  · Lose weight, if needed  Being overweight increases your risk of certain health conditions  These include heart disease, high blood pressure, type 2 diabetes, and certain types of cancer  · Protect your skin  Do not sunbathe or use tanning beds  Use sunscreen with a SPF 15 or higher  Apply sunscreen at least 15 minutes before you go outside  Reapply sunscreen every 2 hours  Wear protective clothing, hats, and sunglasses when you are outside  · Drive safely  Always wear your seatbelt  Make sure everyone in your car wears a seatbelt  A seatbelt can save your life if you are in an accident  Do not use your cell phone when you are driving  This could distract you and cause an accident  Pull over if you need to make a call or send a text message  · Practice safe sex  Use latex condoms if are sexually active and have more than one partner  Your healthcare provider may recommend screening tests for sexually transmitted infections (STIs)  · Wear helmets, lifejackets, and protective gear  Always wear a helmet when you ride a bike or motorcycle, go skiing, or play sports that could cause a head injury  Wear protective equipment when you play sports  Wear a lifejacket when you are on a boat or doing water sports  CARE AGREEMENT:   You have the right to help plan your care  Learn about your health condition and how it may be treated  Discuss treatment options with your caregivers to decide what care you want to receive  You always have the right to refuse treatment  The above information is an  only  It is not intended as medical advice for individual conditions or treatments  Talk to your doctor, nurse or pharmacist before following any medical regimen to see if it is safe and effective for you  © 2017 [employer]  Information is for End User's use only and may not be sold, redistributed or otherwise used for commercial purposes  All illustrations and images included in CareNotes® are the copyrighted property of [employer]  or [employer]

## 2018-06-04 NOTE — PROGRESS NOTES
Assessment/Plan:      Diagnoses and all orders for this visit:    Gastroesophageal reflux disease with esophagitis  -     ondansetron (ZOFRAN) 4 mg tablet; 1 tab po every 8 hours for 2 days then prn nausea or vomiting  -     ranitidine (ZANTAC) 150 mg tablet; 1 tab po every AM and PM for 1 month then prn indigestion    Nausea  -     ondansetron (ZOFRAN) 4 mg tablet; 1 tab po every 8 hours for 2 days then prn nausea or vomiting  -     ranitidine (ZANTAC) 150 mg tablet; 1 tab po every AM and PM for 1 month then prn indigestion    Diverticulitis    Arthralgia, unspecified joint    Acute bilateral low back pain without sciatica          Subjective:     Patient ID: Christine Boston is a 58 y o  female  Patient presents to office for follow up on Cough and previous symptoms  Patient is on her last day of antibiotics and completed her Medrol Dose Pack already  Patient states her cough improved significantly and denies sinus congestion, PND, ear ache, fever, or chills  Patient started last night when laying down with acid reflux coming up into her throat and today feels nauseous since awakening  Denies vomiting or diarrhea  C/O left lower abdominal pain occurring and does have Hx of Diverticulitis  Denies any black tarry or blood BMs  Denies any problems with urination, constipation, or diarrhea  Review of Systems    GENERAL:  Feels well, denies any significant changes in weight without trying  SKIN: Denies rashes, lesions, opened areas, wounds, change in moles or any other skin changes  HEENT:  Denies any head injury or headaches   Negative blurred vision, floaters, spots before eyes, infections, or other vision problems   Negative significant changes in vision or hearing   Negative tinnitus, vertigo, or infections   Hx of hay fever symptoms, no ear pain, no sinus congestion, no bloody noses, or problems with smell   No PND, Negative sore throat, bleeding gums, ulcers, or sores     Glasses/Contacts: Glasses  Hearing Aids: NO  Dentures/Partials/Implants:   Lower Right Implant and Capped Teeth  NECK:  Denies lumps, goiter, pain, swollen glands, or lymphadenopathy  BREASTS:  Denies lumps, pain, nipple discharge, swelling, redness, or any other changes  RESPIRATORY:  No cough, no wheezing, shortness of breath, dyspnea, or orthopnea  CARDIOVASCULAR:  Denies chest pain or palpitations  GASTROINTESTINAL:  Appetite good, C/O nausea with indigestion  No  Vomiting   Bowel movements normal occurring about once daily or every other day  URINARY:  Denies frequency, urgency, incontinence, dysuria, hematuria, nocturia, or recent flank pain  GENITAL:  Denies vaginal discharge, pelvic infection, lesions, ulcers, or pain  Negative dyspareunia or abnormal vaginal bleeding  PERIPHERAL VASCULAR:  Denies varicosities, swelling, skin changes, or pain  MUSCULOSKELETAL:  C/O generalized joint and back pain      Negative problems with mobility or movement  PSYCHIATRIC:  Denies problems with depression, anxiety, anger, or other psychiatric symptoms  NEUROLOGIC:  Denies fainting, dizziness, memory problems, seizures, tingling, motor or sensory loss  HEMATOLOGIC:  Denies easy bruising, bleeding, or anemia  ENDOCRINE:  Denies thyroid problems, temperature intolerance, excessive sweating, or other endocrine symptoms  Objective:     Physical Exam   Nursing note and vitals reviewed  GENERAL:  Appears well nourished, well groomed, in no acute distress  SKIN:  Palms warm, dry, color good   Nails without clubbing or cyanosis     No lesions, ulcerations, or wounds  HEAD: Maddy Lynn is average texture   Scalp without lesions, normocephalic, and atraumatic  EYES:  Visual fields full by confrontation   Conjunctiva pink, sclera white, PERRLA   Extraocular movements intact   Disc margins sharp, without hemorrhages or exudate   No arteriolar narrowing or A-V nicking    EARS:  B/L ear canals clear   B/L TMs clear with + light reflex     Acuity good to whispered voice   Green midline   AC>BC  NOSE: Mucosa pink, moist, septum midline  No sinus tenderness  B/L turbinates red and edematous no yellow exudate  MOUTH:  Oral mucosa pink   Pharynx pink, moist, no exudate  Dentition ok  Tongue midline  NECK:  Supple, trachea midline, Negative thyromegaly, lymphadenopathy, or swollen glands  LYMPH NODES:  Negative enlargement of neck, axillary, epitrochlear, or inguinal nodes  THORAX/LUNGS  Thorax symmetric with good excursion  Lungs resonant   Breath sounds clear throughout all fields  Diaphragm descends within normal limits  CARDIOVASCULAR:  Carotid upstrokes brisk and without bruits  Apical impulse discrete and tapping, barely palpable in the 5th ICS/MCL   Normal S1 and Normal S2, Negative S3 or S4     Negative murmurs, thrills, lifts, or heaves  ABDOMEN:  Protuberant, bowel sounds normal active x 4 quadrants     Negative tenderness   Negative masses  Negative hepatomegaly   Negative splenomegaly  Negative costovertebral tenderness  EXTREMITIES:  Warm, calves supple, non-tender, negative for edema     Negative stasis pigmentation or ulcers  +2 pulses throughout  MUSCULOSKELETAL:  Negative joint deformities     Good range of motion in hands, wrists, elbows, shoulders, spine, hips, knees, and ankles  Negative spinal curvature  NEUROLOGICAL:  Mental status:  Awake, alert, and oriented to person, place, time, and event  Normal thought processes     Cranial Nerves:  II-XII intact  Motor:  Good muscle bulk and tone  Strength: 5/5 throughout  Cerebellar:  Rapid alternating movements, point-to-point movements intact  Gait stable and fluid    Sensory:  Pinprick, light touch, position sense, vibration, and stereogenesis intact     Romberg: Negative  Reflexes: +2 throughout

## 2018-06-04 NOTE — LETTER
June 4, 2018     Patient: Abilio Albright   YOB: 1955   Date of Visit: 6/4/2018       To Whom it May Concern: Iqra Moya is under my professional care  She was seen in my office on 6/4/2018  Please excuse her from work from 6/4/2018 through 6/10/2018 and she may return to work on 6/11/2018  If you have any questions or concerns, please don't hesitate to call           Sincerely,          KELLIE Gallegos        CC: Valeri Sosa

## 2018-07-30 LAB — HBA1C MFR BLD HPLC: 5.4 %

## 2018-08-16 ENCOUNTER — TELEPHONE (OUTPATIENT)
Dept: FAMILY MEDICINE CLINIC | Facility: CLINIC | Age: 63
End: 2018-08-16

## 2018-08-16 DIAGNOSIS — E53.8 VITAMIN B12 DEFICIENCY: ICD-10-CM

## 2018-08-16 DIAGNOSIS — J01.00 ACUTE MAXILLARY SINUSITIS, RECURRENCE NOT SPECIFIED: ICD-10-CM

## 2018-08-16 DIAGNOSIS — I10 ESSENTIAL HYPERTENSION: ICD-10-CM

## 2018-08-16 DIAGNOSIS — K21.00 GASTROESOPHAGEAL REFLUX DISEASE WITH ESOPHAGITIS: ICD-10-CM

## 2018-08-16 DIAGNOSIS — B37.2 CANDIDIASIS OF SKIN: ICD-10-CM

## 2018-08-16 DIAGNOSIS — Z00.00 HEALTHCARE MAINTENANCE: ICD-10-CM

## 2018-08-16 DIAGNOSIS — E78.49 OTHER HYPERLIPIDEMIA: ICD-10-CM

## 2018-08-16 DIAGNOSIS — R11.0 NAUSEA: ICD-10-CM

## 2018-08-16 DIAGNOSIS — E55.9 VITAMIN D DEFICIENCY: ICD-10-CM

## 2018-08-16 DIAGNOSIS — M15.8 OTHER OSTEOARTHRITIS INVOLVING MULTIPLE JOINTS: ICD-10-CM

## 2018-08-17 DIAGNOSIS — E03.9 ACQUIRED HYPOTHYROIDISM: ICD-10-CM

## 2018-08-17 RX ORDER — LEVOTHYROXINE SODIUM 112 UG/1
112 TABLET ORAL DAILY
Qty: 90 TABLET | Refills: 3 | Status: SHIPPED | OUTPATIENT
Start: 2018-08-17 | End: 2019-02-27 | Stop reason: SDUPTHER

## 2018-08-20 RX ORDER — HYDROCHLOROTHIAZIDE 12.5 MG/1
12.5 CAPSULE, GELATIN COATED ORAL DAILY
Qty: 90 CAPSULE | Refills: 1 | Status: SHIPPED | OUTPATIENT
Start: 2018-08-20 | End: 2019-02-27 | Stop reason: SDUPTHER

## 2018-08-20 RX ORDER — RANITIDINE 150 MG/1
TABLET ORAL
Qty: 180 TABLET | Refills: 1 | Status: SHIPPED | OUTPATIENT
Start: 2018-08-20 | End: 2019-02-27 | Stop reason: SDUPTHER

## 2018-08-20 RX ORDER — KETOCONAZOLE 20 MG/G
CREAM TOPICAL 2 TIMES DAILY PRN
Qty: 60 G | Refills: 5 | Status: SHIPPED | OUTPATIENT
Start: 2018-08-20 | End: 2021-07-19 | Stop reason: SDUPTHER

## 2018-08-20 RX ORDER — FLUTICASONE PROPIONATE 50 MCG
2 SPRAY, SUSPENSION (ML) NASAL DAILY PRN
Qty: 16 G | Refills: 5 | Status: SHIPPED | OUTPATIENT
Start: 2018-08-20 | End: 2021-10-18

## 2018-08-20 RX ORDER — CYANOCOBALAMIN (VITAMIN B-12) 1000 MCG
1000 TABLET ORAL EVERY OTHER DAY
Qty: 90 TABLET | Refills: 1 | Status: SHIPPED | OUTPATIENT
Start: 2018-08-20 | End: 2020-01-27 | Stop reason: SDUPTHER

## 2018-08-20 RX ORDER — ATORVASTATIN CALCIUM 20 MG/1
20 TABLET, FILM COATED ORAL
Qty: 90 TABLET | Refills: 1 | Status: SHIPPED | OUTPATIENT
Start: 2018-08-20 | End: 2018-10-09 | Stop reason: SDUPTHER

## 2018-08-20 RX ORDER — IBUPROFEN 800 MG/1
800 TABLET ORAL EVERY 8 HOURS PRN
Qty: 270 TABLET | Refills: 1 | Status: SHIPPED | OUTPATIENT
Start: 2018-08-20 | End: 2018-09-20 | Stop reason: ALTCHOICE

## 2018-08-20 RX ORDER — ERGOCALCIFEROL 1.25 MG/1
50000 CAPSULE ORAL
Qty: 12 CAPSULE | Refills: 1 | Status: SHIPPED | OUTPATIENT
Start: 2018-08-20 | End: 2019-02-27 | Stop reason: SDUPTHER

## 2018-08-20 RX ORDER — VERAPAMIL HYDROCHLORIDE 120 MG/1
120 CAPSULE, EXTENDED RELEASE ORAL DAILY
Qty: 90 CAPSULE | Refills: 1 | Status: SHIPPED | OUTPATIENT
Start: 2018-08-20 | End: 2018-08-28 | Stop reason: SDDI

## 2018-08-20 RX ORDER — ASPIRIN 81 MG/1
81 TABLET ORAL DAILY
Qty: 90 TABLET | Refills: 1 | Status: SHIPPED | OUTPATIENT
Start: 2018-08-20 | End: 2019-02-27 | Stop reason: SDUPTHER

## 2018-08-28 ENCOUNTER — OFFICE VISIT (OUTPATIENT)
Dept: FAMILY MEDICINE CLINIC | Facility: CLINIC | Age: 63
End: 2018-08-28
Payer: COMMERCIAL

## 2018-08-28 VITALS
BODY MASS INDEX: 38.83 KG/M2 | HEART RATE: 79 BPM | WEIGHT: 256.2 LBS | DIASTOLIC BLOOD PRESSURE: 82 MMHG | SYSTOLIC BLOOD PRESSURE: 132 MMHG | HEIGHT: 68 IN | RESPIRATION RATE: 18 BRPM | TEMPERATURE: 97.6 F | OXYGEN SATURATION: 97 %

## 2018-08-28 DIAGNOSIS — M15.9 PRIMARY OSTEOARTHRITIS INVOLVING MULTIPLE JOINTS: ICD-10-CM

## 2018-08-28 DIAGNOSIS — Z13.0 SCREENING FOR DEFICIENCY ANEMIA: ICD-10-CM

## 2018-08-28 DIAGNOSIS — E53.8 VITAMIN B12 DEFICIENCY: ICD-10-CM

## 2018-08-28 DIAGNOSIS — E78.5 HYPERLIPIDEMIA, UNSPECIFIED HYPERLIPIDEMIA TYPE: ICD-10-CM

## 2018-08-28 DIAGNOSIS — Z13.1 SCREENING FOR DIABETES MELLITUS: ICD-10-CM

## 2018-08-28 DIAGNOSIS — E55.9 VITAMIN D DEFICIENCY: ICD-10-CM

## 2018-08-28 DIAGNOSIS — H53.9 VISUAL DISTURBANCE OF ONE EYE: ICD-10-CM

## 2018-08-28 DIAGNOSIS — K11.8 PAROTID MASS: Primary | ICD-10-CM

## 2018-08-28 DIAGNOSIS — E03.9 ACQUIRED HYPOTHYROIDISM: ICD-10-CM

## 2018-08-28 PROCEDURE — 3008F BODY MASS INDEX DOCD: CPT | Performed by: NURSE PRACTITIONER

## 2018-08-28 PROCEDURE — 99214 OFFICE O/P EST MOD 30 MIN: CPT | Performed by: NURSE PRACTITIONER

## 2018-08-28 RX ORDER — LANOLIN ALCOHOL/MO/W.PET/CERES
1 CREAM (GRAM) TOPICAL 2 TIMES DAILY
Qty: 60 TABLET | Refills: 5 | Status: SHIPPED | OUTPATIENT
Start: 2018-08-28 | End: 2020-01-27 | Stop reason: ALTCHOICE

## 2018-08-28 NOTE — PATIENT INSTRUCTIONS
Soft Tissue Mass   WHAT YOU NEED TO KNOW:   A soft tissue mass is a lump or area of swelling under your skin  The lump may feel hard, soft, or painful  It may be found anywhere on your body  The cause of your lump may not be known  Common causes include injury, infection, or a benign (non-cancerous) tumor  Rarely, a soft tissue mass is a sign of cancer  DISCHARGE INSTRUCTIONS:   Follow up with your healthcare provider as directed: You will need other tests to find the cause of your soft tissue mass  Write down your questions so you remember to ask them during your visits  Return to the emergency department if:   · Your mass suddenly gets bigger or changes color  · Your mass starts bleeding  · Your arm or leg closest to the mass is numb, tingling, or feels cold  Contact your healthcare provider if:   · You have a fever or chills  · Your mass is red, swollen, or draining pus  · Your mass is more painful  · You have new symptoms  · You have questions or concerns about your condition or care  © 2017 [facility] Information is for End User's use only and may not be sold, redistributed or otherwise used for commercial purposes  All illustrations and images included in CareNotes® are the copyrighted property of [employer] M [employer]  or [employer] Cruz[employer]  The above information is an  only  It is not intended as medical advice for individual conditions or treatments  Talk to your doctor, nurse or pharmacist before following any medical regimen to see if it is safe and effective for you  Low-Sodium Diet   WHAT YOU NEED TO KNOW:   What is a low-sodium diet? A low-sodium diet limits foods that are high in sodium (salt)  You will need to follow a low-sodium diet if you have high blood pressure, kidney disease, or heart failure  You may also need to follow this diet if you have a condition that is causing your body to retain (hold) extra fluid   You may need to limit the amount of sodium you eat to 1,500 mg  Ask your healthcare provider how much sodium you can have each day  How can I use food labels to choose foods that are low in sodium? Read food labels to find the amount of sodium they contain  The amount of sodium is listed in milligrams (mg)  The % Daily Value (DV) column tells you how much of your daily needs are met by 1 serving of the food for each nutrient listed  Choose foods that have less than 5% of the DV of sodium  These foods are considered low in sodium  Foods that have 20% or more of the DV of sodium are considered high in sodium  Some food labels may also list any of the following terms that tell you about the sodium content in the food:  · Sodium-free:  Less than 5 mg in each serving    · Very low sodium:  35 mg of sodium or less in each serving    · Low sodium:  140 mg of sodium or less in each serving    · Reduced sodium: At least 25% less sodium in each serving than the regular type    · Light in sodium:  50% less sodium in each serving    · Unsalted or no added salt:  No extra salt is added during processing (the food may still contain sodium)  Which foods should I avoid? Salty foods are high in sodium   You should avoid the following:  · Processed foods:      ¨ Mixes for cornbread, biscuits, cake, and pudding     ¨ Instant foods, such as potatoes, cereals, noodles, and rice     ¨ Packaged foods, such as bread stuffing, rice and pasta mixes, snack dip mixes, and macaroni and cheese     ¨ Canned foods, such as canned vegetables, soups, broths, sauces, and vegetable or tomato juice    ¨ Snack foods, such as salted chips, popcorn, pretzels, pork rinds, salted crackers, and salted nuts    ¨ Frozen foods, such as dinners, entrees, vegetables with sauces, and breaded meats    ¨ Sauerkraut, pickled vegetables, and other foods prepared in brine    · Meats and cheeses:      ¨ Smoked or cured meat, such as corned beef, [place], ham, hot dogs, and sausage    ¨ Canned meats or spreads, such as potted meats, sardines, anchovies, and imitation seafood    ¨ Deli or lunch meats, such as bologna, ham, turkey, and roast beef    ¨ Processed cheese, such as American cheese and cheese spreads    · Condiments, sauces, and seasonings:      ¨ Salt (¼ teaspoon of salt contains 575 mg of sodium)    ¨ Seasonings made with salt, such as garlic salt, celery salt, onion salt, and seasoned salt    ¨ Regular soy sauce, barbecue sauce, teriyaki sauce, steak sauce, Worcestershire sauce, and most flavored vinegars    ¨ Canned gravy and mixes     ¨ Regular condiments, such as mustard, ketchup, and salad dressings    ¨ Pickles and olives    ¨ Meat tenderizers and monosodium glutamate (MSG)  Which foods can I include? Read the food label to find the amount of sodium in each serving  · Bread and cereal:  Try to choose breads with less than 80 mg of sodium per serving  ¨ Bread, roll, taz, tortilla, or unsalted crackers  ¨ Ready-to-eat cereals with less than 5% DV of sodium (examples include shredded wheat and puffed rice)    ¨ Pasta    · Vegetables and fruits:      ¨ Unsalted fresh, frozen, or canned vegetables    ¨ Fresh, frozen, or canned fruits    ¨ Fruit juice    · Dairy:  One serving has about 150 mg of sodium  ¨ Milk, all types    ¨ Yogurt    ¨ Hard cheese, such as cheddar, Swiss, [employer], or mozzarella    · Meat and other protein foods:  Some raw meats may have added sodium  ¨ Plain meats, fish, and poultry     ¨ Egg    · Other foods:      ¨ Homemade pudding    ¨ Unsalted nuts, popcorn, or pretzels    ¨ Unsalted butter or margarine  What are some ways that I can decrease sodium? · Add spices and herbs to foods instead of salt during cooking  Use salt-free seasonings to add flavor to foods  Examples include onion powder, garlic powder, basil, prater powder, paprika, and parsley  Try lemon or lime juice or vinegar to give foods a tart flavor   Use hot peppers, pepper, or cayenne pepper to add a spicy flavor to foods  · Do not keep a salt shaker at your kitchen table  This may help keep you from adding salt to food at the table  It may take time to get used to enjoying the natural flavor of food instead of adding salt  Talk to your healthcare provider before you use salt substitutes  Some salt substitutes have a high amount of potassium and need to be avoided if you have kidney disease  · Choose low-sodium foods at restaurants  Meals from restaurants are often high in sodium  Some restaurants have nutrition information on the menu that tells you the amount of sodium in their foods  If possible, ask for your food to be prepared with less, or no salt  · Shop for unsalted or low-sodium foods and snacks at the grocery store  Examples include unsalted or low-sodium broths, soups, and canned vegetables  Choose fresh or frozen vegetables instead  Choose unsalted nuts or seeds or fresh fruits or vegetables as snacks  Read food labels and choose salt-free, very low-sodium, or low-sodium foods  CARE AGREEMENT:   You have the right to help plan your care  Discuss treatment options with your caregivers to decide what care you want to receive  You always have the right to refuse treatment  The above information is an  only  It is not intended as medical advice for individual conditions or treatments  Talk to your doctor, nurse or pharmacist before following any medical regimen to see if it is safe and effective for you  © 2017 [address] Information is for End User's use only and may not be sold, redistributed or otherwise used for commercial purposes  All illustrations and images included in CareNotes® are the copyrighted property of A D A M , Inc  or [employer]  Wellness Visit for Adults   WHAT YOU NEED TO KNOW:   What is a wellness visit?   A wellness visit is when you see your healthcare provider to get screened for health problems  You can also get advice on how to stay healthy  Write down your questions so you remember to ask them  Ask your healthcare provider how often you should have a wellness visit  What happens at a wellness visit? Your healthcare provider will ask about your health, and your family history of health problems  This includes high blood pressure, heart disease, and cancer  He or she will ask if you have symptoms that concern you, if you smoke, and about your mood  You may also be asked about your intake of medicines, supplements, food, and alcohol  Any of the following may be done:  · Your weight  will be checked  Your height may also be checked so your body mass index (BMI) can be calculated  Your BMI shows if you are at a healthy weight  · Your blood pressure  and heart rate will be checked  Your temperature may also be checked  · Blood and urine tests  may be done  Blood tests may be done to check your cholesterol levels  Abnormal cholesterol levels increase your risk for heart disease and stroke  You may also need a blood or urine test to check for diabetes if you are at increased risk  Urine tests may be done to look for signs of an infection or kidney disease  · A physical exam  includes checking your heartbeat and lungs with a stethoscope  Your healthcare provider may also check your skin to look for sun damage  · Screening tests  may be recommended  A screening test is done to check for diseases that may not cause symptoms  The screening tests you may need depend on your age, gender, family history, and lifestyle habits  For example, colorectal screening may be recommended if you are 48years old or older  What screening tests do I need if I am a woman? · A Pap smear  is used to screen for cervical cancer  Pap smears are usually done every 3 to 5 years depending on your age   You may need them more often if you have had abnormal Pap smear test results in the past  Ask your healthcare provider how often you should have a Pap smear  · A mammogram  is an x-ray of your breasts to screen for breast cancer  Experts recommend mammograms every 2 years starting at age 48 years  You may need a mammogram at age 52 years or younger if you have an increased risk for breast cancer  Talk to your healthcare provider about when you should start having mammograms and how often you need them  What vaccines might I need? · Get an influenza vaccine  every year  The influenza vaccine protects you from the flu  Several types of viruses cause the flu  The viruses change over time, so new vaccines are made each year  · Get a tetanus-diphtheria (Td) booster vaccine  every 10 years  This vaccine protects you against tetanus and diphtheria  Tetanus is a severe infection that may cause painful muscle spasms and lockjaw  Diphtheria is a severe bacterial infection that causes a thick covering in the back of your mouth and throat  · Get a human papillomavirus (HPV) vaccine  if you are female and aged 23 to 32 or male 23 to 24 and never received it  This vaccine protects you from HPV infection  HPV is the most common infection spread by sexual contact  HPV may also cause vaginal, penile, and anal cancers  · Get a pneumococcal vaccine  if you are aged 72 years or older  The pneumococcal vaccine is an injection given to protect you from pneumococcal disease  Pneumococcal disease is an infection caused by pneumococcal bacteria  The infection may cause pneumonia, meningitis, or an ear infection  · Get a shingles vaccine  if you are aged [address] or older, even if you have had shingles before  The shingles vaccine is an injection to protect you from the varicella-zoster virus  This is the same virus that causes chickenpox  Shingles is a painful rash that develops in people who had chickenpox or have been exposed to the virus  How can I eat healthy? My Plate is a model for planning healthy meals   It shows the types and amounts of foods that should go on your plate  Fruits and vegetables make up about half of your plate, and grains and protein make up the other half  A serving of dairy is included on the side of your plate  The amount of calories and serving sizes you need depends on your age, gender, weight, and height  Examples of healthy foods are listed below:  · Eat a variety of vegetables  such as dark green, red, and orange vegetables  You can also include canned vegetables low in sodium (salt) and frozen vegetables without added butter or sauces  · Eat a variety of fresh fruits , canned fruit in 100% juice, frozen fruit, and dried fruit  · Include whole grains  At least half of the grains you eat should be whole grains  Examples include whole-wheat bread, wheat pasta, brown rice, and whole-grain cereals such as oatmeal     · Eat a variety of protein foods such as seafood (fish and shellfish), lean meat, and poultry without skin (turkey and chicken)  Examples of lean meats include pork leg, shoulder, or tenderloin, and beef round, sirloin, tenderloin, and extra lean ground beef  Other protein foods include eggs and egg substitutes, beans, peas, soy products, nuts, and seeds  · Choose low-fat dairy products such as skim or 1% milk or low-fat yogurt, cheese, and cottage cheese  · Limit unhealthy fats  such as butter, hard margarine, and shortening  How much exercise do I need? Exercise at least 30 minutes per day on most days of the week  Some examples of exercise include walking, biking, dancing, and swimming  You can also fit in more physical activity by taking the stairs instead of the elevator or parking farther away from stores  Include muscle strengthening activities 2 days each week  Regular exercise provides many health benefits  It helps you manage your weight, and decreases your risk for type 2 diabetes, heart disease, stroke, and high blood pressure   Exercise can also help improve your mood  Ask your healthcare provider about the best exercise plan for you  What are some general health and safety guidelines I should follow? · Do not smoke  Nicotine and other chemicals in cigarettes and cigars can cause lung damage  Ask your healthcare provider for information if you currently smoke and need help to quit  E-cigarettes or smokeless tobacco still contain nicotine  Talk to your healthcare provider before you use these products  · Limit alcohol  A drink of alcohol is 12 ounces of beer, 5 ounces of wine, or 1½ ounces of liquor  · Lose weight, if needed  Being overweight increases your risk of certain health conditions  These include heart disease, high blood pressure, type 2 diabetes, and certain types of cancer  · Protect your skin  Do not sunbathe or use tanning beds  Use sunscreen with a SPF 15 or higher  Apply sunscreen at least 15 minutes before you go outside  Reapply sunscreen every 2 hours  Wear protective clothing, hats, and sunglasses when you are outside  · Drive safely  Always wear your seatbelt  Make sure everyone in your car wears a seatbelt  A seatbelt can save your life if you are in an accident  Do not use your cell phone when you are driving  This could distract you and cause an accident  Pull over if you need to make a call or send a text message  · Practice safe sex  Use latex condoms if are sexually active and have more than one partner  Your healthcare provider may recommend screening tests for sexually transmitted infections (STIs)  · Wear helmets, lifejackets, and protective gear  Always wear a helmet when you ride a bike or motorcycle, go skiing, or play sports that could cause a head injury  Wear protective equipment when you play sports  Wear a lifejacket when you are on a boat or doing water sports  CARE AGREEMENT:   You have the right to help plan your care  Learn about your health condition and how it may be treated   Discuss treatment options with your caregivers to decide what care you want to receive  You always have the right to refuse treatment  The above information is an  only  It is not intended as medical advice for individual conditions or treatments  Talk to your doctor, nurse or pharmacist before following any medical regimen to see if it is safe and effective for you  © 2017 [facility] Information is for End User's use only and may not be sold, redistributed or otherwise used for commercial purposes  All illustrations and images included in CareNotes® are the copyrighted property of A D A M , Inc  or [employer]

## 2018-08-28 NOTE — PROGRESS NOTES
Assessment/Plan:      Diagnoses and all orders for this visit:    Parotid mass  -     CT head w wo contrast; Future  -     CT soft tissue neck w wo contrast; Future  -     Comprehensive metabolic panel; Future    Screening for deficiency anemia  -     CBC and differential; Future    Vitamin D deficiency  -     Vitamin D 25 hydroxy; Future    Vitamin B12 deficiency  -     Vitamin B12; Future    Screening for diabetes mellitus  -     Comprehensive metabolic panel; Future  -     Hemoglobin A1C; Future  -     Insulin, fasting; Future    Primary osteoarthritis involving multiple joints  -     glucosamine-chondroitin 500-400 MG tablet; Take 1 tablet by mouth 2 (two) times a day    Hyperlipidemia, unspecified hyperlipidemia type  -     Lipid panel; Future    Acquired hypothyroidism  -     TSH, 3rd generation; Future    Visual disturbance of one eye  -     CT head w wo contrast; Future  -     CT soft tissue neck w wo contrast; Future  -     Comprehensive metabolic panel; Future          Subjective:     Patient ID: Prosper Dao is a 58 y o  female  Patient presents to office for follow up and recheck  Complete medical history and medications reviewed with patient and tolerating all medications well without any problems  Patient states she has not been taking her Enalapril due to her blood pressure has been running low when she checks it at home blood pressure is usually around 120s/70s  C/O left heel pain ongoing for the past 4 weeks that is worse in AM upon taking first step in AM in addition has pain in heel during the night  C/O left earache radiating into left jaw that occurs intermittently in addition feels her left eyesight become blurred at times  Patient states she feels a lump under left ear  C/O B/L hand, finger, and lower extremity joint pain occurring recently  Patient denies any other problems or concerns at present time           Review of Systems    GENERAL:  Feels well, denies any significant changes in weight without trying  SKIN: Denies rashes, lesions, opened areas, wounds, change in moles or any other skin changes  HEENT:  Denies any head injury or headaches   C/O visual disturbance left eye which becomes blurry at times, right eye negative blurred vision, floaters, spots before eyes, infections, or other vision problems   Negative significant changes in vision or hearing   Negative tinnitus, vertigo, or infections   Hx of hay fever symptoms, C/O ear pain radiating into jaw, no sinus congestion, no bloody noses, or problems with smell   No PND, Negative sore throat, bleeding gums, ulcers, or sores  Glasses/Contacts: Glasses  Hearing Aids: NO  Dentures/Partials/Implants:   Lower Right Implant and Capped Teeth  NECK:  Denies l goiter, pain, swollen glands, or lymphadenopathy  C/O lump under left ear for several weeks  BREASTS:  Denies lumps, pain, nipple discharge, swelling, redness, or any other changes  RESPIRATORY:  No cough, no wheezing, shortness of breath, dyspnea, or orthopnea     CARDIOVASCULAR:  Denies chest pain or palpitations  GASTROINTESTINAL:  Appetite good, No nausea or indigestion  No  Vomiting   Bowel movements normal occurring about once daily or every other day  URINARY:  Denies frequency, urgency, incontinence, dysuria, hematuria, nocturia, or recent flank pain  GENITAL:  Denies vaginal discharge, pelvic infection, lesions, ulcers, or pain  Negative dyspareunia or abnormal vaginal bleeding  PERIPHERAL VASCULAR:  Denies varicosities, swelling, skin changes, or pain  MUSCULOSKELETAL: No back or joint pain    Negative problems with mobility or movement  PSYCHIATRIC:  Denies problems with depression, anxiety, anger, or other psychiatric symptoms  NEUROLOGIC:  Denies fainting, dizziness, memory problems, seizures, tingling, motor or sensory loss  HEMATOLOGIC:  Denies easy bruising, bleeding, or anemia    ENDOCRINE:  Denies thyroid problems, temperature intolerance, excessive sweating, or other endocrine symptoms  Objective:     Physical Exam   Nursing note and vitals reviewed  GENERAL:  Appears well nourished, well groomed, in no acute distress  SKIN:  Palms warm, dry, color good   Nails without clubbing or cyanosis     No lesions, ulcerations, or wounds  HEAD: Jackelyn Fought is average texture   Scalp without lesions, normocephalic, and atraumatic  EYES:  Visual fields full by confrontation   Conjunctiva pink, sclera white, PERRLA   Extraocular movements intact   Disc margins sharp, without hemorrhages or exudate   No arteriolar narrowing or A-V nicking  EARS:  B/L ear canals clear   B/L TMs clear with + light reflex     Acuity good to whispered voice   Green midline   AC>BC  NOSE: Mucosa pink, moist, septum midline  No sinus tenderness  B/L turbinates red and edematous no yellow exudate  MOUTH:  Oral mucosa pink   Pharynx pink, moist, no exudate  Dentition ok  Tongue midline  NECK:  Supple, trachea midline, Negative thyromegaly, lymphadenopathy, or swollen glands  LYMPH NODES:  Negative enlargement of  axillary, epitrochlear, or inguinal nodes  Positive enlargement of parotid node, 2cm x 2cm firm, tender, nonmobile  THORAX/LUNGS  Thorax symmetric with good excursion  Lungs resonant   Breath sounds clear throughout all fields  Diaphragm descends within normal limits  CARDIOVASCULAR:  Carotid upstrokes brisk and without bruits  Apical impulse discrete and tapping, barely palpable in the 5th ICS/MCL   Normal S1 and Normal S2, Negative S3 or S4     Negative murmurs, thrills, lifts, or heaves  ABDOMEN:  Protuberant, bowel sounds normal active x 4 quadrants     Negative tenderness   Negative masses  Negative hepatomegaly   Negative splenomegaly  Negative costovertebral tenderness  EXTREMITIES:  Warm, calves supple, non-tender, negative for edema   Negative stasis pigmentation or ulcers  +2 pulses throughout  MUSCULOSKELETAL:  Negative joint deformities     Good range of motion in hands, wrists, elbows, shoulders, spine, hips, knees, and ankles  Negative spinal curvature  NEUROLOGICAL:  Mental status:  Awake, alert, and oriented to person, place, time, and event  Normal thought processes     Cranial Nerves:  II-XII intact  Motor:  Good muscle bulk and tone  Strength: 5/5 throughout  Cerebellar:  Rapid alternating movements, point-to-point movements intact  Gait stable and fluid  Sensory:  Pinprick, light touch, position sense, vibration, and stereogenesis intact     Romberg: Negative  Reflexes: +2 throughout

## 2018-09-20 ENCOUNTER — TELEPHONE (OUTPATIENT)
Dept: FAMILY MEDICINE CLINIC | Facility: CLINIC | Age: 63
End: 2018-09-20

## 2018-09-20 DIAGNOSIS — M25.552 LEFT HIP PAIN: ICD-10-CM

## 2018-09-20 DIAGNOSIS — M25.562 ACUTE PAIN OF LEFT KNEE: ICD-10-CM

## 2018-09-20 DIAGNOSIS — M54.16 LUMBAR BACK PAIN WITH RADICULOPATHY AFFECTING LEFT LOWER EXTREMITY: ICD-10-CM

## 2018-09-20 DIAGNOSIS — M25.572 ACUTE LEFT ANKLE PAIN: ICD-10-CM

## 2018-09-20 DIAGNOSIS — M25.552 LEFT HIP PAIN: Primary | ICD-10-CM

## 2018-09-20 RX ORDER — METHYLPREDNISOLONE 4 MG/1
TABLET ORAL
Qty: 21 TABLET | Refills: 1 | Status: SHIPPED | OUTPATIENT
Start: 2018-09-20 | End: 2018-09-20 | Stop reason: SDUPTHER

## 2018-09-20 RX ORDER — METHYLPREDNISOLONE 4 MG/1
TABLET ORAL
Qty: 21 TABLET | Refills: 1 | Status: SHIPPED | OUTPATIENT
Start: 2018-09-20 | End: 2019-02-27 | Stop reason: ALTCHOICE

## 2018-09-20 NOTE — TELEPHONE ENCOUNTER
Rx for X-Ray Lumbar Spine, Left Hip, Left Knee, and Left Ankle ordered and Rx for Medrol Dose Pack sig: use as directed with food # 1 pack #1 ref along with Rx Diclofenac 50mg si tab po 2 x daily #60 #5ref escribed to [address] and instruct patient to D/C Ibuprofen

## 2018-09-20 NOTE — TELEPHONE ENCOUNTER
Patient called C/O left hip, knee, and ankle pain  States she can hardly walk on it  Asking for X-rays to be faxed to her job at [phone] with ATTN: Shellie Tejeda Also asking for something for the pain Motrin not helping

## 2018-10-02 ENCOUNTER — TELEPHONE (OUTPATIENT)
Dept: FAMILY MEDICINE CLINIC | Facility: CLINIC | Age: 63
End: 2018-10-02

## 2018-10-02 DIAGNOSIS — E03.9 ACQUIRED HYPOTHYROIDISM: Primary | ICD-10-CM

## 2018-10-02 DIAGNOSIS — E04.2 MULTINODULAR GOITER: ICD-10-CM

## 2018-10-02 DIAGNOSIS — E04.9 ENLARGED THYROID GLAND: ICD-10-CM

## 2018-10-02 NOTE — TELEPHONE ENCOUNTER
Please notify patient I would like her to be evaluated by ENT of her choice regarding enlarged right thyroid gland possible multi-nodular goiter and due to her Hx of hypothyroidism  Referral order already placed in system

## 2018-10-09 DIAGNOSIS — E78.49 OTHER HYPERLIPIDEMIA: ICD-10-CM

## 2018-10-09 RX ORDER — ATORVASTATIN CALCIUM 20 MG/1
20 TABLET, FILM COATED ORAL
Qty: 90 TABLET | Refills: 1 | Status: SHIPPED | OUTPATIENT
Start: 2018-10-09 | End: 2019-02-27 | Stop reason: SDUPTHER

## 2019-01-07 LAB — HBA1C MFR BLD HPLC: 5.5 %

## 2019-02-27 ENCOUNTER — OFFICE VISIT (OUTPATIENT)
Dept: FAMILY MEDICINE CLINIC | Facility: CLINIC | Age: 64
End: 2019-02-27
Payer: COMMERCIAL

## 2019-02-27 VITALS
HEIGHT: 68 IN | WEIGHT: 258 LBS | RESPIRATION RATE: 18 BRPM | SYSTOLIC BLOOD PRESSURE: 118 MMHG | TEMPERATURE: 98.4 F | BODY MASS INDEX: 39.1 KG/M2 | DIASTOLIC BLOOD PRESSURE: 76 MMHG | OXYGEN SATURATION: 98 % | HEART RATE: 68 BPM

## 2019-02-27 DIAGNOSIS — E78.49 OTHER HYPERLIPIDEMIA: ICD-10-CM

## 2019-02-27 DIAGNOSIS — Z13.0 SCREENING FOR DEFICIENCY ANEMIA: ICD-10-CM

## 2019-02-27 DIAGNOSIS — E55.9 VITAMIN D DEFICIENCY: ICD-10-CM

## 2019-02-27 DIAGNOSIS — M25.552 LEFT HIP PAIN: ICD-10-CM

## 2019-02-27 DIAGNOSIS — E03.9 ACQUIRED HYPOTHYROIDISM: Primary | ICD-10-CM

## 2019-02-27 DIAGNOSIS — R25.2 LEG CRAMPS: ICD-10-CM

## 2019-02-27 DIAGNOSIS — Z00.00 HEALTHCARE MAINTENANCE: ICD-10-CM

## 2019-02-27 DIAGNOSIS — I10 ESSENTIAL HYPERTENSION: ICD-10-CM

## 2019-02-27 DIAGNOSIS — R73.9 HYPERGLYCEMIA: ICD-10-CM

## 2019-02-27 DIAGNOSIS — E53.8 VITAMIN B12 DEFICIENCY: ICD-10-CM

## 2019-02-27 DIAGNOSIS — K21.00 GASTROESOPHAGEAL REFLUX DISEASE WITH ESOPHAGITIS: ICD-10-CM

## 2019-02-27 PROCEDURE — 3074F SYST BP LT 130 MM HG: CPT | Performed by: NURSE PRACTITIONER

## 2019-02-27 PROCEDURE — 3008F BODY MASS INDEX DOCD: CPT | Performed by: NURSE PRACTITIONER

## 2019-02-27 PROCEDURE — 3078F DIAST BP <80 MM HG: CPT | Performed by: NURSE PRACTITIONER

## 2019-02-27 PROCEDURE — 99214 OFFICE O/P EST MOD 30 MIN: CPT | Performed by: NURSE PRACTITIONER

## 2019-02-27 RX ORDER — HYDROCHLOROTHIAZIDE 12.5 MG/1
12.5 CAPSULE, GELATIN COATED ORAL DAILY
Qty: 90 CAPSULE | Refills: 1 | Status: SHIPPED | OUTPATIENT
Start: 2019-02-27 | End: 2019-11-07 | Stop reason: SDUPTHER

## 2019-02-27 RX ORDER — ERGOCALCIFEROL 1.25 MG/1
50000 CAPSULE ORAL
Qty: 12 CAPSULE | Refills: 1 | Status: SHIPPED | OUTPATIENT
Start: 2019-02-27 | End: 2020-01-27 | Stop reason: SDUPTHER

## 2019-02-27 RX ORDER — ASPIRIN 81 MG/1
81 TABLET ORAL DAILY
Qty: 90 TABLET | Refills: 1 | Status: SHIPPED | OUTPATIENT
Start: 2019-02-27 | End: 2020-06-30 | Stop reason: SDUPTHER

## 2019-02-27 RX ORDER — ATORVASTATIN CALCIUM 20 MG/1
20 TABLET, FILM COATED ORAL
Qty: 90 TABLET | Refills: 1 | Status: SHIPPED | OUTPATIENT
Start: 2019-02-27 | End: 2020-01-20 | Stop reason: SDUPTHER

## 2019-02-27 RX ORDER — LEVOTHYROXINE SODIUM 112 UG/1
112 TABLET ORAL DAILY
Qty: 90 TABLET | Refills: 3 | Status: SHIPPED | OUTPATIENT
Start: 2019-02-27 | End: 2019-08-12 | Stop reason: SDUPTHER

## 2019-02-27 RX ORDER — RANITIDINE 150 MG/1
TABLET ORAL
Qty: 180 TABLET | Refills: 1 | Status: SHIPPED | OUTPATIENT
Start: 2019-02-27 | End: 2020-01-27 | Stop reason: ALTCHOICE

## 2019-02-27 NOTE — PROGRESS NOTES
Assessment/Plan:      Diagnoses and all orders for this visit:    Acquired hypothyroidism  -     magnesium oxide (MAG-OX) 400 mg; Take 1 tablet (400 mg total) by mouth daily at bedtime  -     Basic metabolic panel; Future  -     Magnesium; Future  -     levothyroxine 112 mcg tablet; Take 1 tablet (112 mcg total) by mouth daily  -     TSH, 3rd generation; Future    Hyperglycemia  -     Comprehensive metabolic panel; Future  -     Hemoglobin A1C; Future  -     Insulin, fasting; Future    Gastroesophageal reflux disease with esophagitis  -     ranitidine (ZANTAC) 150 mg tablet; 1 tab po every AM and PM for 1 month then prn indigestion    Essential hypertension  -     hydrochlorothiazide (MICROZIDE) 12 5 mg capsule; Take 1 capsule (12 5 mg total) by mouth daily    Other hyperlipidemia  -     atorvastatin (LIPITOR) 20 mg tablet; Take 1 tablet (20 mg total) by mouth daily at bedtime  -     Lipid panel; Future    Vitamin D deficiency  -     ergocalciferol (VITAMIN D2) 50,000 units; Take 1 capsule (50,000 Units total) by mouth every 14 (fourteen) days  -     Vitamin D 25 hydroxy; Future    Vitamin B12 deficiency  -     Vitamin B12; Future    Leg cramps  -     magnesium oxide (MAG-OX) 400 mg; Take 1 tablet (400 mg total) by mouth daily at bedtime  -     Basic metabolic panel; Future  -     Magnesium; Future    Left hip pain  -     diclofenac sodium (VOLTAREN) 50 mg EC tablet; Take 1 tablet (50 mg total) by mouth 2 (two) times a day    Healthcare maintenance  -     aspirin (ECOTRIN LOW STRENGTH) 81 mg EC tablet; Take 1 tablet (81 mg total) by mouth daily    Screening for deficiency anemia  -     CBC and differential; Future          Subjective:     Patient ID: Nick Boo is a 71 y o  female  Patient presents to office for follow up and recheck  Complete medical history and medications reviewed with patient and tolerating all medications well without any problems    C/O cramping pain in B/L lower extremities occurring for the past 3 weeks  Denies any redness or warmth of lower extremities  Does have chronic swelling in B/L legs  Patient states she has not been to her Chiropractor recently and missed her January 2019 appointment with Chiropractor  Patient states when she gets the Chiropractic Treatment this usually helps relieve the pain in her legs when she gets the massages  Patient had Annual Gynecological Exam done at [facility] in January 2019 which was WNL  Patient underwent CT Scan of Neck and Biopsy of Thyroid Nodules which were Benign and is scheduled for follow up Thyroid Ultrasound in 6 months with Dr Renée Roy ENT  Denies any other problems or concerns at the present time  Patient has not been taking her BP medication for the past 6 months since she was sick with N/V/D 6 months ago and continues to check her blood pressure at home which is normal      Review of Systems    GENERAL:  Feels well, denies any significant changes in weight without trying  SKIN: Denies rashes, lesions, opened areas, wounds, change in moles or any other skin changes  HEENT:  Denies any head injury or headaches   No blurred vision   Negative significant changes in vision or hearing   Negative tinnitus, vertigo, or infections   Hx of hay fever symptoms, No er pain   No PND, Negative sore throat, bleeding gums, ulcers, or sores  Glasses/Contacts: Glasses  Hearing Aids: NO  Dentures/Partials/Implants:   Lower Right Implant and Capped Teeth  NECK:  Denies swollen glands or lymphadenopathy  BREASTS:  Denies lumps, pain, nipple discharge, swelling, redness, or any other changes  RESPIRATORY:  No cough, no wheezing, shortness of breath, dyspnea, or orthopnea     CARDIOVASCULAR:  Denies chest pain or palpitations  GASTROINTESTINAL:  Appetite good, No nausea or indigestion  No  Vomiting   Bowel movements normal occurring about once daily or every other day    URINARY:  Denies frequency, urgency, incontinence, dysuria, hematuria, nocturia, or recent flank pain  GENITAL:  Denies vaginal discharge, pelvic infection, lesions, ulcers, or pain  Negative dyspareunia or abnormal vaginal bleeding  PERIPHERAL VASCULAR:  Denies varicosities, swelling, skin changes, or pain  MUSCULOSKELETAL: No back, C/O cramping pain occurring in B/L legs ongoing for the past 3 weeks  Negative problems with mobility or movement  PSYCHIATRIC:  Denies problems with depression, anxiety, anger, or other psychiatric symptoms  NEUROLOGIC:  Denies fainting, dizziness, memory problems, seizures, tingling, motor or sensory loss  HEMATOLOGIC:  Denies easy bruising, bleeding, or anemia  ENDOCRINE:  Denies thyroid problems, temperature intolerance, excessive sweating, or other endocrine symptoms  Objective:     Physical Exam   Nursing note and vitals reviewed  GENERAL:  Appears well nourished, well groomed, in no acute distress  SKIN:  Palms warm, dry, color good   Nails without clubbing or cyanosis     No lesions, ulcerations, or wounds  HEAD: Irene Martino is average texture   Scalp without lesions, normocephalic, and atraumatic  EYES:  Visual fields full by confrontation   Conjunctiva pink, sclera white, PERRLA   Extraocular movements intact  EARS:  B/L ear canals clear   B/L TMs clear with + light reflex     NOSE: Mucosa pink, moist, septum midline  No sinus tenderness  B/L turbinates red and edematous no yellow exudate  MOUTH:  Oral mucosa pink   Pharynx pink, moist, no exudate  Dentition ok  Tongue midline  NECK:  Supple, trachea midline, Negative thyromegaly, lymphadenopathy, or swollen glands  LYMPH NODES:  Negative enlargement of  axillary, epitrochlear, or inguinal nodes  THORAX/LUNGS  Thorax symmetric with good excursion  Lungs resonant   Breath sounds clear throughout all fields   Diaphragm descends within normal limits  CARDIOVASCULAR:  Carotid upstrokes brisk and without bruits     Apical impulse discrete and tapping, barely palpable in the 5th ICS/MCL   Normal S1 and Normal S2, Negative S3 or S4     Negative murmurs, thrills, lifts, or heaves  ABDOMEN:  Protuberant, bowel sounds normal active x 4 quadrants     Negative tenderness   Negative masses  Negative hepatomegaly   Negative splenomegaly  Negative costovertebral tenderness  EXTREMITIES:  Warm, calves supple, + tenderness of B/L posterior lower extremities upon palpation with +1 pitting edema of B/L legs   Negative stasis pigmentation or ulcers  +2 pulses throughout  MUSCULOSKELETAL:  Negative joint deformities     Good range of motion in hands, wrists, elbows, shoulders, spine, hips, knees, and ankles  Negative spinal curvature  NEUROLOGICAL:  Mental status:  Awake, alert, and oriented to person, place, time, and event  Normal thought processes     Cranial Nerves:  II-XII intact  Motor:  Good muscle bulk and tone  Strength: 5/5 throughout  Cerebellar:  Rapid alternating movements, point-to-point movements intact  Gait stable and fluid  Sensory:  Pinprick, light touch, position sense, vibration, and stereogenesis intact     Romberg: Negative  Reflexes: +2 throughout  BMI Counseling: Body mass index is 39 23 kg/m²  Discussed the patient's BMI with her  The BMI is above average  BMI counseling and education was provided to the patient  Nutrition recommendations include moderation in carbohydrate intake

## 2019-02-27 NOTE — PATIENT INSTRUCTIONS
Obesity   AMBULATORY CARE:   Obesity  is when your body mass index (BMI) is greater than 30  Your healthcare provider will use your height and weight to measure your BMI  The risks of obesity include  many health problems, such as injuries or physical disability  You may need tests to check for the following:  · Diabetes     · High blood pressure or high cholesterol     · Heart disease     · Gallbladder or liver disease     · Cancer of the colon, breast, prostate, liver, or kidney     · Sleep apnea     · Arthritis or gout  Seek care immediately if:   · You have a severe headache, confusion, or difficulty speaking  · You have weakness on one side of your body  · You have chest pain, sweating, or shortness of breath  Contact your healthcare provider if:   · You have symptoms of gallbladder or liver disease, such as pain in your upper abdomen  · You have knee or hip pain and discomfort while walking  · You have symptoms of diabetes, such as intense hunger and thirst, and frequent urination  · You have symptoms of sleep apnea, such as snoring or daytime sleepiness  · You have questions or concerns about your condition or care  Treatment for obesity  focuses on helping you lose weight to improve your health  Even a small decrease in BMI can reduce the risk for many health problems  Your healthcare provider will help you set a weight-loss goal   · Lifestyle changes  are the first step in treating obesity  These include making healthy food choices and getting regular physical activity  Your healthcare provider may suggest a weight-loss program that involves coaching, education, and therapy  · Medicine  may help you lose weight when it is used with a healthy diet and physical activity  · Surgery  can help you lose weight if you are very obese and have other health problems  There are several types of weight-loss surgery  Ask your healthcare provider for more information    Be successful losing weight:   · Set small, realistic goals  An example of a small goal is to walk for 20 minutes 5 days a week  Anther goal is to lose 5% of your body weight  · Tell friends, family members, and coworkers about your goals  and ask for their support  Ask a friend to lose weight with you, or join a weight-loss support group  · Identify foods or triggers that may cause you to overeat , and find ways to avoid them  Remove tempting high-calorie foods from your home and workplace  Place a bowl of fresh fruit on your kitchen counter  If stress causes you to eat, then find other ways to cope with stress  · Keep a diary to track what you eat and drink  Also write down how many minutes of physical activity you do each day  Weigh yourself once a week and record it in your diary  Eating changes: You will need to eat 500 to 1,000 fewer calories each day than you currently eat to lose 1 to 2 pounds a week  The following changes will help you cut calories:  · Eat smaller portions  Use small plates, no larger than 9 inches in diameter  Fill your plate half full of fruits and vegetables  Measure your food using measuring cups until you know what a serving size looks like  · Eat 3 meals and 1 or 2 snacks each day  Plan your meals in advance  [place] and eat at home most of the time  Eat slowly  · Eat fruits and vegetables at every meal   They are low in calories and high in fiber, which makes you feel full  Do not add butter, margarine, or cream sauce to vegetables  Use herbs to season steamed vegetables  · Eat less fat and fewer fried foods  Eat more baked or grilled chicken and fish  These protein sources are lower in calories and fat than red meat  Limit fast food  Dress your salads with olive oil and vinegar instead of bottled dressing  · Limit the amount of sugar you eat  Do not drink sugary beverages  Limit alcohol  Activity changes:  Physical activity is good for your body in many ways   It helps you burn calories and build strong muscles  It decreases stress and depression, and improves your mood  It can also help you sleep better  Talk to your healthcare provider before you begin an exercise program   · Exercise for at least 30 minutes 5 days a week  Start slowly  Set aside time each day for physical activity that you enjoy and that is convenient for you  It is best to do both weight training and an activity that increases your heart rate, such as walking, bicycling, or swimming  · Find ways to be more active  Do yard work and housecleaning  Walk up the stairs instead of using elevators  Spend your leisure time going to events that require walking, such as outdoor festivals or fairs  This extra physical activity can help you lose weight and keep it off  Follow up with your healthcare provider as directed: You may need to meet with a dietitian  Write down your questions so you remember to ask them during your visits  © 2017 [address] Information is for End User's use only and may not be sold, redistributed or otherwise used for commercial purposes  All illustrations and images included in CareNotes® are the copyrighted property of [employer]  or [employer]  The above information is an  only  It is not intended as medical advice for individual conditions or treatments  Talk to your doctor, nurse or pharmacist before following any medical regimen to see if it is safe and effective for you  Weight Management   AMBULATORY CARE:   Why it is important to manage your weight:  Being overweight increases your risk of health conditions such as heart disease, high blood pressure, type 2 diabetes, and certain types of cancer  It can also increase your risk for osteoarthritis, sleep apnea, and other respiratory problems  Aim for a slow, steady weight loss  Even a small amount of weight loss can lower your risk of health problems    How to lose weight safely:  A safe and healthy way to lose weight is to eat fewer calories and get regular exercise  You can lose up about 1 pound a week by decreasing the number of calories you eat by 500 calories each day  You can decrease calories by eating smaller portion sizes or by cutting out high-calorie foods  Read labels to find out how many calories are in the foods you eat  You can also burn calories with exercise such as walking, swimming, or biking  You will be more likely to keep weight off if you make these changes part of your lifestyle  Healthy meal plan for weight management:  A healthy meal plan includes a variety of foods, contains fewer calories, and helps you stay healthy  A healthy meal plan includes the following:  · Eat whole-grain foods more often  A healthy meal plan should contain fiber  Fiber is the part of grains, fruits, and vegetables that is not broken down by your body  Whole-grain foods are healthy and provide extra fiber in your diet  Some examples of whole-grain foods are whole-wheat breads and pastas, oatmeal, brown rice, and bulgur  · Eat a variety of vegetables every day  Include dark, leafy greens such as spinach, kale, nataliia greens, and mustard greens  Eat yellow and orange vegetables such as carrots, sweet potatoes, and winter squash  · Eat a variety of fruits every day  Choose fresh or canned fruit (canned in its own juice or light syrup) instead of juice  Fruit juice has very little or no fiber  · Eat low-fat dairy foods  Drink fat-free (skim) milk or 1% milk  Eat fat-free yogurt and low-fat cottage cheese  Try low-fat cheeses such as mozzarella and other reduced-fat cheeses  · Choose meat and other protein foods that are low in fat  Choose beans or other legumes such as split peas or lentils  Choose fish, skinless poultry (chicken or turkey), or lean cuts of red meat (beef or pork)  Before you cook meat or poultry, cut off any visible fat  · Use less fat and oil  Try baking foods instead of frying them  Add less fat, such as margarine, sour cream, regular salad dressing and mayonnaise to foods  Eat fewer high-fat foods  Some examples of high-fat foods include french fries, doughnuts, ice cream, and cakes  · Eat fewer sweets  Limit foods and drinks that are high in sugar  This includes candy, cookies, regular soda, and sweetened drinks  Ways to decrease calories:   · Eat smaller portions  ¨ Use a small plate with smaller servings  ¨ Do not eat second helpings  ¨ When you eat at a restaurant, ask for a box and place half of your meal in the box before you eat  ¨ Share an entrée with someone else  · Replace high-calorie snacks with healthy, low-calorie snacks  ¨ Choose fresh fruit, vegetables, fat-free rice cakes, or air-popped popcorn instead of potato chips, nuts, or chocolate  ¨ Choose water or calorie-free drinks instead of soda or sweetened drinks  · Eat regular meals  Skipping meals can lead to overeating later in the day  Eat a healthy snack in place of a meal if you do not have time to eat a regular meal      · Do not shop for groceries when you are hungry  You may be more likely to make unhealthy food choices  Take a grocery list of healthy foods and shop after you have eaten  Exercise:  Exercise at least 30 minutes per day on most days of the week  Some examples of exercise include walking, biking, dancing, and swimming  You can also fit in more physical activity by taking the stairs instead of the elevator or parking farther away from stores  Ask your healthcare provider about the best exercise plan for you  Other things to consider as you try to lose weight:   · Be aware of situations that may give you the urge to overeat, such as eating while watching television  Find ways to avoid these situations  For example, read a book, go for a walk, or do crafts      · Meet with a weight loss support group or friends who are also trying to lose weight  This may help you stay motivated to continue working on your weight loss goals  © 2017 [address] Information is for End User's use only and may not be sold, redistributed or otherwise used for commercial purposes  All illustrations and images included in CareNotes® are the copyrighted property of [employer]  or [employer]  The above information is an  only  It is not intended as medical advice for individual conditions or treatments  Talk to your doctor, nurse or pharmacist before following any medical regimen to see if it is safe and effective for you  Low Fat Diet   AMBULATORY CARE:   A low-fat diet  is an eating plan that is low in total fat, unhealthy fat, and cholesterol  You may need to follow a low-fat diet if you have trouble digesting or absorbing fat  You may also need to follow this diet if you have high cholesterol  You can also lower your cholesterol by increasing the amount of fiber in your diet  Soluble fiber is a type of fiber that helps to decrease cholesterol levels  Different types of fat in food:   · Limit unhealthy fats  A diet that is high in cholesterol, saturated fat, and trans fat may cause unhealthy cholesterol levels  Unhealthy cholesterol levels increase your risk of heart disease  ¨ Cholesterol:  Limit intake of cholesterol to less than 200 mg per day  Cholesterol is found in meat, eggs, and dairy  ¨ Saturated fat:  Limit saturated fat to less than 7% of your total daily calories  Ask your dietitian how many calories you need each day  Saturated fat is found in butter, cheese, ice cream, whole milk, and palm oil  Saturated fat is also found in meat, such as beef, pork, chicken skin, and processed meats  Processed meats include sausage, hot dogs, and bologna  ¨ Trans fat:  Avoid trans fat as much as possible  Trans fat is used in fried and baked foods   Foods that say trans fat free on the label may still have up to 0 5 grams of trans fat per serving  · Include healthy fats  Replace foods that are high in saturated and trans fat with foods high in healthy fats  This may help to decrease high cholesterol levels  ¨ Monounsaturated fats: These are found in avocados, nuts, and vegetable oils, such as olive, canola, and sunflower oil  ¨ Polyunsaturated fats: These can be found in vegetable oils, such as soybean or corn oil  Omega-3 fats can help to decrease the risk of heart disease  Omega-3 fats are found in fish, such as salmon, herring, trout, and tuna  Omega-3 fats can also be found in plant foods, such as walnuts, flaxseed, soybeans, and canola oil    Foods to limit or avoid:   · Grains:      ¨ Snacks that are made with partially hydrogenated oils, such as chips, regular crackers, and butter-flavored popcorn    ¨ High-fat baked goods, such as biscuits, croissants, doughnuts, pies, cookies, and pastries    · Dairy:      ¨ Whole milk, 2% milk, and yogurt and ice cream made with whole milk    ¨ Half and half creamer, heavy cream, and whipping cream    ¨ Cheese, cream cheese, and sour cream    · Meats and proteins:      ¨ High-fat cuts of meat (T-bone steak, regular hamburger, and ribs)    ¨ Fried meat, poultry (turkey and chicken), and fish    ¨ Poultry (chicken and turkey) with skin    ¨ Cold cuts (salami or bologna), hot dogs, camargo, and sausage    ¨ Whole eggs and egg yolks    · Vegetables and fruits with added fat:      ¨ Fried vegetables or vegetables in butter or high-fat sauces, such as cream or cheese sauces    ¨ Fried fruit or fruit served with butter or cream    · Fats:      ¨ Butter, stick margarine, and shortening    ¨ Coconut, palm oil, and palm kernel oil  Foods to include:   · Grains:      ¨ Whole-grain breads, cereals, pasta, and brown rice    ¨ Low-fat crackers and pretzels    · Vegetables and fruits:      ¨ Fresh, frozen, or canned vegetables (no salt or low-sodium)    ¨ Fresh, frozen, dried, or canned fruit (canned in light syrup or fruit juice)    ¨ Avocado    · Low-fat dairy products:      ¨ Nonfat (skim) or 1% milk    ¨ Nonfat or low-fat cheese, yogurt, and cottage cheese    · Meats and proteins:      ¨ Chicken or turkey with no skin    ¨ Baked or broiled fish    ¨ Lean beef and pork (loin, round, extra lean hamburger)    ¨ Beans and peas, unsalted nuts, soy products    ¨ Egg whites and substitutes    ¨ Seeds and nuts    · Fats:      ¨ Unsaturated oil, such as canola, olive, peanut, soybean, or sunflower oil    ¨ Soft or liquid margarine and vegetable oil spread    ¨ Low-fat salad dressing  Other ways to decrease fat:   · Read food labels before you buy foods  Choose foods that have less than 30% of calories from fat  Choose low-fat or fat-free dairy products  Remember that fat free does not mean calorie free  These foods still contain calories, and too many calories can lead to weight gain  · Trim fat from meat and avoid fried food  Trim all visible fat from meat before you cook it  Remove the skin from poultry  Do not trivedi meat, fish, or poultry  Bake, roast, boil, or broil these foods instead  Avoid fried foods  Eat a baked potato instead of [place] fries  Steam vegetables instead of sautéing them in butter  · Add less fat to foods  Use imitation cam[place] bits on salads and baked potatoes instead of regular [place] bits  Use fat-free or low-fat salad dressings instead of regular dressings  Use low-fat or nonfat butter-flavored topping instead of regular butter or margarine on popcorn and other foods  Ways to decrease fat in recipes:  Replace high-fat ingredients with low-fat or nonfat ones  This may cause baked goods to be drier than usual  You may need to use nonfat cooking spray on pans to prevent food from sticking  You also may need to change the amount of other ingredients, such as water, in the recipe   Try the following:  · Use low-fat or light margarine instead of regular margarine or shortening  · Use lean ground turkey breast or chicken, or lean ground beef (less than 5% fat) instead of hamburger  · Add 1 teaspoon of canola oil to 8 ounces of skim milk instead of using cream or half and half  · Use grated zucchini, carrots, or apples in breads instead of coconut  · Use blenderized, low-fat cottage cheese, plain tofu, or low-fat ricotta cheese instead of cream cheese  · Use 1 egg white and 1 teaspoon of canola oil, or use ¼ cup (2 ounces) of fat-free egg substitute instead of a whole egg  · Replace half of the oil that is called for in a recipe with applesauce when you bake  Use 3 tablespoons of cocoa powder and 1 tablespoon of canola oil instead of a square of baking chocolate  How to increase fiber:  Eat enough high-fiber foods to get 20 to 30 grams of fiber every day  Slowly increase your fiber intake to avoid stomach cramps, gas, and other problems  · Eat 3 ounces of whole-grain foods each day  An ounce is about 1 slice of bread  Eat whole-grain breads, such as whole-wheat bread  Whole wheat, whole-wheat flour, or other whole grains should be listed as the first ingredient on the food label  Replace white flour with whole-grain flour or use half of each in recipes  Whole-grain flour is heavier than white flour, so you may have to add more yeast or baking powder  · Eat a high-fiber cereal for breakfast   Oatmeal is a good source of soluble fiber  Look for cereals that have bran or fiber in the name  Choose whole-grain products, such as brown rice, barley, and whole-wheat pasta  · Eat more beans, peas, and lentils  For example, add beans to soups or salads  Eat at least 5 cups of fruits and vegetables each day  Eat fruits and vegetables with the peel because the peel is high in fiber  © 2017 [address] [place] Information is for End User's use only and may not be sold, redistributed or otherwise used for commercial purposes   All illustrations and images included in CareNotes® are the copyrighted property of [employer]  or [employer]  The above information is an  only  It is not intended as medical advice for individual conditions or treatments  Talk to your doctor, nurse or pharmacist before following any medical regimen to see if it is safe and effective for you  Heart Healthy Diet   AMBULATORY CARE:   A heart healthy diet  is an eating plan low in total fat, unhealthy fats, and sodium (salt)  A heart healthy diet helps decrease your risk for heart disease and stroke  Limit the amount of fat you eat to 25% to 35% of your total daily calories  Limit sodium to less than 2,300 mg each day  Healthy fats:  Healthy fats can help improve cholesterol levels  The risk for heart disease is decreased when cholesterol levels are normal  Choose healthy fats, such as the following:  · Unsaturated fat  is found in foods such as soybean, canola, olive, corn, and safflower oils  It is also found in soft tub margarine that is made with liquid vegetable oil  · Omega-3 fat  is found in certain fish, such as salmon, tuna, and trout, and in walnuts and flaxseed  Unhealthy fats:  Unhealthy fats can cause unhealthy cholesterol levels in your blood and increase your risk of heart disease  Limit unhealthy fats, such as the following:  · Cholesterol  is found in animal foods, such as eggs and lobster, and in dairy products made from whole milk  Limit cholesterol to less than 300 milligrams (mg) each day  You may need to limit cholesterol to 200 mg each day if you have heart disease  · Saturated fat  is found in meats, such as cam[place] and hamburger  It is also found in chicken or turkey skin, whole milk, and butter  Limit saturated fat to less than 7% of your total daily calories  Limit saturated fat to less than 6% if you have heart disease or are at increased risk for it       · Trans fat  is found in packaged foods, such as potato chips and cookies  It is also in hard margarine, some fried foods, and shortening  Avoid trans fats as much as possible    Heart healthy foods and drinks to include:  Ask your dietitian or healthcare provider how many servings to have from each of the following food groups:  · Grains:      ¨ Whole-wheat breads, cereals, and pastas, and brown rice    ¨ Low-fat, low-sodium crackers and chips    · Vegetables:      ¨ Broccoli, green beans, green peas, and spinach    ¨ Collards, kale, and lima beans    ¨ Carrots, sweet potatoes, tomatoes, and peppers    ¨ Canned vegetables with no salt added    · Fruits:      ¨ Bananas, peaches, pears, and pineapple    ¨ Grapes, raisins, and dates    ¨ Oranges, tangerines, grapefruit, orange juice, and grapefruit juice    ¨ Apricots, mangoes, melons, and papaya    ¨ Raspberries and strawberries    ¨ Canned fruit with no added sugar    · Low-fat dairy products:      ¨ Nonfat (skim) milk, 1% milk, and low-fat almond, cashew, or soy milks fortified with calcium    ¨ Low-fat cheese, regular or frozen yogurt, and cottage cheese    · Meats and proteins , such as lean cuts of beef and pork (loin, leg, round), skinless chicken and turkey, legumes, soy products, egg whites, and nuts  Foods and drinks to limit or avoid:  Ask your dietitian or healthcare provider about these and other foods that are high in unhealthy fat, sodium, and sugar:  · Snack or packaged foods , such as frozen dinners, cookies, macaroni and cheese, and cereals with more than 300 mg of sodium per serving    · Canned or dry mixes  for cakes, soups, sauces, or gravies    · Vegetables with added sodium , such as instant potatoes, vegetables with added sauces, or regular canned vegetables    · Other foods high in sodium , such as ketchup, barbecue sauce, salad dressing, pickles, olives, soy sauce, and miso    · High-fat dairy foods  such as whole or 2% milk, cream cheese, or sour cream, and cheeses     · High-fat protein foods  such as high-fat cuts of beef (T-bone steaks, ribs), chicken or turkey with skin, and organ meats, such as liver    · Cured or smoked meats , such as hot dogs, cam[place], and sausage    · Unhealthy fats and oils , such as butter, stick margarine, shortening, and cooking oils such as coconut or palm oil    · Food and drinks high in sugar , such as soft drinks (soda), sports drinks, sweetened tea, candy, cake, cookies, pies, and doughnuts  Other diet guidelines to follow:   · Eat more foods containing omega-3 fats  Eat fish high in omega-3 fats at least 2 times a week  · Limit alcohol  Too much alcohol can damage your heart and raise your blood pressure  Women should limit alcohol to 1 drink a day  Men should limit alcohol to 2 drinks a day  A drink of alcohol is 12 ounces of beer, 5 ounces of wine, or 1½ ounces of liquor  · Choose low-sodium foods  High-sodium foods can lead to high blood pressure  Add little or no salt to food you prepare  Use herbs and spices in place of salt  · Eat more fiber  to help lower cholesterol levels  Eat at least 5 servings of fruits and vegetables each day  Eat 3 ounces of whole-grain foods each day  Legumes (beans) are also a good source of fiber  Lifestyle guidelines:   · Do not smoke  Nicotine and other chemicals in cigarettes and cigars can cause lung and heart damage  Ask your healthcare provider for information if you currently smoke and need help to quit  E-cigarettes or smokeless tobacco still contain nicotine  Talk to your healthcare provider before you use these products  · Exercise regularly  to help you maintain a healthy weight and improve your blood pressure and cholesterol levels  Ask your healthcare provider about the best exercise plan for you  Do not start an exercise program without asking your healthcare provider  Follow up with your healthcare provider as directed:  Write down your questions so you remember to ask them during your visits     © 2017 [facility] [address] is for End User's use only and may not be sold, redistributed or otherwise used for commercial purposes  All illustrations and images included in CareNotes® are the copyrighted property of [employer]  or [employer]  The above information is an  only  It is not intended as medical advice for individual conditions or treatments  Talk to your doctor, nurse or pharmacist before following any medical regimen to see if it is safe and effective for you  Calorie Counting Diet   WHAT YOU NEED TO KNOW:   What is a calorie counting diet? It is a meal plan based on counting calories each day to reach a healthy body weight  You will need to eat fewer calories if you are trying to lose weight  Weight loss may decrease your risk for certain health problems or improve your health if you have health problems  Some of these health problems include heart disease, high blood pressure, and diabetes  What foods should I avoid? Your dietitian will tell you if you need to avoid certain foods based on your body weight and health condition  You may need to avoid high-fat foods if you are at risk for or have heart disease  You may need to eat fewer foods from the breads and starches food group if you have diabetes  How many calories are in foods? The following is a list of foods and drinks with the approximate number of calories in each  Check the food label to find the exact number of calories  A dietitian can tell you how many calories you should have from each food group each day    · Carbohydrate:      ¨ ½ of a 3-inch bagel, 1 slice of bread, or ½ of a hamburger bun or hot dog bun (80)    ¨ 1 (8-inch) flour tortilla or ½ cup of cooked rice (100)    ¨ 1 (6-inch) corn tortilla (80)    ¨ 1 (6-inch) pancake or 1 cup of bran flakes cereal (110)    ¨ ½ cup of cooked cereal (80)    ¨ ½ cup of cooked pasta (85)    ¨ 1 ounce of pretzels (100)    ¨ 3 cups of air-popped popcorn without butter or oil (80)    · Dairy:      ¨ 1 cup of skim or 1% milk (90)    ¨ 1 cup of 2% milk (120)    ¨ 1 cup of whole milk (160)    ¨ 1 cup of 2% chocolate milk (220)    ¨ 1 ounce of low-fat cheese with 3 grams of fat per ounce (70)    ¨ 1 ounce of cheddar cheese (114)    ¨ ½ cup of 1% fat cottage cheese (80)    ¨ 1 cup of plain or sugar-free, fat-free yogurt (90)    · Protein foods:      ¨ 3 ounces of fish (not breaded or fried) (95)    ¨ 3 ounces of breaded, fried fish (195)    ¨ ¾ cup of tuna canned in water (105)    ¨ 3 ounces of chicken breast without skin (105)    ¨ 1 fried chicken breast with skin (350)    ¨ ¼ cup of fat free egg substitute (40)    ¨ 1 large egg (75)    ¨ 3 ounces of lean beef or pork (165)    ¨ 3 ounces of fried pork chop or ham (185)    ¨ ½ cup of cooked dried beans, such as kidney, gonzalez, lentils, or navy (115)    ¨ 3 ounces of bologna or lunch meat (225)    ¨ 2 links of breakfast sausage (140)    · Vegetables:      ¨ ½ cup of sliced mushrooms (10)    ¨ 1 cup of salad greens, such as lettuce, spinach, or kell (15)    ¨ ½ cup of steamed asparagus (20)    ¨ ½ cup of cooked summer squash, zucchini squash, or green or wax beans (25)    ¨ 1 cup of broccoli or cauliflower florets, or 1 medium tomato (25)    ¨ 1 large raw carrot or ½ cup of cooked carrots (40)    ¨ ? of a medium cucumber or 1 stalk of celery (5)    ¨ 1 small baked potato (160)    ¨ 1 cup of breaded, fried vegetables (230)    · Fruit:      ¨ 1 (6-inch) banana (55)     ¨ ½ of a 4-inch grapefruit (55)    ¨ 15 grapes (60)    ¨ 1 medium orange or apple (70)    ¨ 1 large peach (65)    ¨ 1 cup of fresh pineapple chunks (75)    ¨ 1 cup of melon cubes (50)    ¨ 1¼ cups of whole strawberries (45)    ¨ ½ cup of fruit canned in juice (55)    ¨ ½ cup of fruit canned in heavy syrup (110)    ¨ ?  cup of raisins (130)    ¨ ½ cup of unsweetened fruit juice (60)    ¨ ½ cup of grape, cranberry, or prune juice (90)    · Fat: ¨ 10 peanuts or 2 teaspoons of peanut butter (55)    ¨ 2 tablespoons of avocado or 1 tablespoon of regular salad dressing (45)    ¨ 2 slices of camargo (90)    ¨ 1 teaspoon of oil, such as safflower, canola, corn, or olive oil (45)    ¨ 2 teaspoons of low-fat margarine, or 1 tablespoon of low-fat mayonnaise (50)    ¨ 1 teaspoon of regular margarine (40)    ¨ 1 tablespoon of regular mayonnaise (135)    ¨ 1 tablespoon of cream cheese or 2 tablespoons of low-fat cream cheese (45)    ¨ 2 tablespoons of vegetable shortening (215)    · Dessert and sweets:      ¨ 8 animal crackers or 5 vanilla wafers (80)    ¨ 1 frozen fruit juice bar (80)    ¨ ½ cup of ice milk or low-fat frozen yogurt (90)    ¨ ½ cup of sherbet or sorbet (125)    ¨ ½ cup of sugar-free pudding or custard (60)    ¨ ½ cup of ice cream (140)    ¨ ½ cup of pudding or custard (175)    ¨ 1 (2-inch) square chocolate brownie (185)    · Combination foods:      ¨ Bean burrito made with an 8-inch tortilla, without cheese (275)    ¨ Chicken breast sandwich with lettuce and tomato (325)    ¨ 1 cup of chicken noodle soup (60)    ¨ 1 beef taco (175)    ¨ Regular hamburger with lettuce and tomato (310)    ¨ Regular cheeseburger with lettuce and tomato (410)     ¨ ¼ of a 12-inch cheese pizza (280)    ¨ Fried fish sandwich with lettuce and tomato (425)    ¨ Hot dog and bun (275)    ¨ 1½ cups of macaroni and cheese (310)    ¨ Taco salad with a fried tortilla shell (870)    · Low-calorie foods:      ¨ 1 tablespoon of ketchup or 1 tablespoon of fat free sour cream (15)    ¨ 1 teaspoon of mustard (5)    ¨ ¼ cup of salsa (20)    ¨ 1 large dill pickle (15)    ¨ 1 tablespoon of fat free salad dressing (10)    ¨ 2 teaspoons of low-sugar, light jam or jelly, or 1 tablespoon of sugar-free syrup (15)    ¨ 1 sugar-free popsicle (15)    ¨ 1 cup of club soda, seltzer water, or diet soda (0)  CARE AGREEMENT:   You have the right to help plan your care   Discuss treatment options with your caregivers to decide what care you want to receive  You always have the right to refuse treatment  The above information is an  only  It is not intended as medical advice for individual conditions or treatments  Talk to your doctor, nurse or pharmacist before following any medical regimen to see if it is safe and effective for you  © 2017 [address] Information is for End User's use only and may not be sold, redistributed or otherwise used for commercial purposes  All illustrations and images included in CareNotes® are the copyrighted property of [employer]  or [employer]  Wellness Visit for Adults   WHAT YOU NEED TO KNOW:   What is a wellness visit? A wellness visit is when you see your healthcare provider to get screened for health problems  You can also get advice on how to stay healthy  Write down your questions so you remember to ask them  Ask your healthcare provider how often you should have a wellness visit  What happens at a wellness visit? Your healthcare provider will ask about your health, and your family history of health problems  This includes high blood pressure, heart disease, and cancer  He or she will ask if you have symptoms that concern you, if you smoke, and about your mood  You may also be asked about your intake of medicines, supplements, food, and alcohol  Any of the following may be done:  · Your weight  will be checked  Your height may also be checked so your body mass index (BMI) can be calculated  Your BMI shows if you are at a healthy weight  · Your blood pressure  and heart rate will be checked  Your temperature may also be checked  · Blood and urine tests  may be done  Blood tests may be done to check your cholesterol levels  Abnormal cholesterol levels increase your risk for heart disease and stroke  You may also need a blood or urine test to check for diabetes if you are at increased risk   Urine tests may be done to look for signs of an infection or kidney disease  · A physical exam  includes checking your heartbeat and lungs with a stethoscope  Your healthcare provider may also check your skin to look for sun damage  · Screening tests  may be recommended  A screening test is done to check for diseases that may not cause symptoms  The screening tests you may need depend on your age, gender, family history, and lifestyle habits  For example, colorectal screening may be recommended if you are 48years old or older  What screening tests do I need if I am a woman? · A Pap smear  is used to screen for cervical cancer  Pap smears are usually done every 3 to 5 years depending on your age  You may need them more often if you have had abnormal Pap smear test results in the past  Ask your healthcare provider how often you should have a Pap smear  · A mammogram  is an x-ray of your breasts to screen for breast cancer  Experts recommend mammograms every 2 years starting at age 48 years  You may need a mammogram at age 52 years or younger if you have an increased risk for breast cancer  Talk to your healthcare provider about when you should start having mammograms and how often you need them  What vaccines might I need? · Get an influenza vaccine  every year  The influenza vaccine protects you from the flu  Several types of viruses cause the flu  The viruses change over time, so new vaccines are made each year  · Get a tetanus-diphtheria (Td) booster vaccine  every 10 years  This vaccine protects you against tetanus and diphtheria  Tetanus is a severe infection that may cause painful muscle spasms and lockjaw  Diphtheria is a severe bacterial infection that causes a thick covering in the back of your mouth and throat  · Get a human papillomavirus (HPV) vaccine  if you are female and aged 23 to 32 or male 23 to 24 and never received it  This vaccine protects you from HPV infection   HPV is the most common infection spread by sexual contact  HPV may also cause vaginal, penile, and anal cancers  · Get a pneumococcal vaccine  if you are aged 72 years or older  The pneumococcal vaccine is an injection given to protect you from pneumococcal disease  Pneumococcal disease is an infection caused by pneumococcal bacteria  The infection may cause pneumonia, meningitis, or an ear infection  · Get a shingles vaccine  if you are aged 61 or older, even if you have had shingles before  The shingles vaccine is an injection to protect you from the varicella-zoster virus  This is the same virus that causes chickenpox  Shingles is a painful rash that develops in people who had chickenpox or have been exposed to the virus  How can I eat healthy? My Plate is a model for planning healthy meals  It shows the types and amounts of foods that should go on your plate  Fruits and vegetables make up about half of your plate, and grains and protein make up the other half  A serving of dairy is included on the side of your plate  The amount of calories and serving sizes you need depends on your age, gender, weight, and height  Examples of healthy foods are listed below:  · Eat a variety of vegetables  such as dark green, red, and orange vegetables  You can also include canned vegetables low in sodium (salt) and frozen vegetables without added butter or sauces  · Eat a variety of fresh fruits , canned fruit in 100% juice, frozen fruit, and dried fruit  · Include whole grains  At least half of the grains you eat should be whole grains  Examples include whole-wheat bread, wheat pasta, brown rice, and whole-grain cereals such as oatmeal     · Eat a variety of protein foods such as seafood (fish and shellfish), lean meat, and poultry without skin (turkey and chicken)  Examples of lean meats include pork leg, shoulder, or tenderloin, and beef round, sirloin, tenderloin, and extra lean ground beef   Other protein foods include eggs and egg substitutes, beans, peas, soy products, nuts, and seeds  · Choose low-fat dairy products such as skim or 1% milk or low-fat yogurt, cheese, and cottage cheese  · Limit unhealthy fats  such as butter, hard margarine, and shortening  How much exercise do I need? Exercise at least 30 minutes per day on most days of the week  Some examples of exercise include walking, biking, dancing, and swimming  You can also fit in more physical activity by taking the stairs instead of the elevator or parking farther away from stores  Include muscle strengthening activities 2 days each week  Regular exercise provides many health benefits  It helps you manage your weight, and decreases your risk for type 2 diabetes, heart disease, stroke, and high blood pressure  Exercise can also help improve your mood  Ask your healthcare provider about the best exercise plan for you  What are some general health and safety guidelines I should follow? · Do not smoke  Nicotine and other chemicals in cigarettes and cigars can cause lung damage  Ask your healthcare provider for information if you currently smoke and need help to quit  E-cigarettes or smokeless tobacco still contain nicotine  Talk to your healthcare provider before you use these products  · Limit alcohol  A drink of alcohol is 12 ounces of beer, 5 ounces of wine, or 1½ ounces of liquor  · Lose weight, if needed  Being overweight increases your risk of certain health conditions  These include heart disease, high blood pressure, type 2 diabetes, and certain types of cancer  · Protect your skin  Do not sunbathe or use tanning beds  Use sunscreen with a SPF 15 or higher  Apply sunscreen at least 15 minutes before you go outside  Reapply sunscreen every 2 hours  Wear protective clothing, hats, and sunglasses when you are outside  · Drive safely  Always wear your seatbelt  Make sure everyone in your car wears a seatbelt   A seatbelt can save your life if you are in an accident  Do not use your cell phone when you are driving  This could distract you and cause an accident  Pull over if you need to make a call or send a text message  · Practice safe sex  Use latex condoms if are sexually active and have more than one partner  Your healthcare provider may recommend screening tests for sexually transmitted infections (STIs)  · Wear helmets, lifejackets, and protective gear  Always wear a helmet when you ride a bike or motorcycle, go skiing, or play sports that could cause a head injury  Wear protective equipment when you play sports  Wear a lifejacket when you are on a boat or doing water sports  CARE AGREEMENT:   You have the right to help plan your care  Learn about your health condition and how it may be treated  Discuss treatment options with your caregivers to decide what care you want to receive  You always have the right to refuse treatment  The above information is an  only  It is not intended as medical advice for individual conditions or treatments  Talk to your doctor, nurse or pharmacist before following any medical regimen to see if it is safe and effective for you  © 2017 [facility] Information is for End User's use only and may not be sold, redistributed or otherwise used for commercial purposes  All illustrations and images included in CareNotes® are the copyrighted property of [employer]  or [employer]  Low-Sodium Diet   WHAT YOU NEED TO KNOW:   What is a low-sodium diet? A low-sodium diet limits foods that are high in sodium (salt)  You will need to follow a low-sodium diet if you have high blood pressure, kidney disease, or heart failure  You may also need to follow this diet if you have a condition that is causing your body to retain (hold) extra fluid  You may need to limit the amount of sodium you eat to 1,500 mg  Ask your healthcare provider how much sodium you can have each day    How can I use food labels to choose foods that are low in sodium? Read food labels to find the amount of sodium they contain  The amount of sodium is listed in milligrams (mg)  The % Daily Value (DV) column tells you how much of your daily needs are met by 1 serving of the food for each nutrient listed  Choose foods that have less than 5% of the DV of sodium  These foods are considered low in sodium  Foods that have 20% or more of the DV of sodium are considered high in sodium  Some food labels may also list any of the following terms that tell you about the sodium content in the food:  · Sodium-free:  Less than 5 mg in each serving    · Very low sodium:  35 mg of sodium or less in each serving    · Low sodium:  140 mg of sodium or less in each serving    · Reduced sodium: At least 25% less sodium in each serving than the regular type    · Light in sodium:  50% less sodium in each serving    · Unsalted or no added salt:  No extra salt is added during processing (the food may still contain sodium)  Which foods should I avoid? Salty foods are high in sodium   You should avoid the following:  · Processed foods:      ¨ Mixes for cornbread, biscuits, cake, and pudding     ¨ Instant foods, such as potatoes, cereals, noodles, and rice     ¨ Packaged foods, such as bread stuffing, rice and pasta mixes, snack dip mixes, and macaroni and cheese     ¨ Canned foods, such as canned vegetables, soups, broths, sauces, and vegetable or tomato juice    ¨ Snack foods, such as salted chips, popcorn, pretzels, pork rinds, salted crackers, and salted nuts    ¨ Frozen foods, such as dinners, entrees, vegetables with sauces, and breaded meats    ¨ Sauerkraut, pickled vegetables, and other foods prepared in brine    · Meats and cheeses:      ¨ Smoked or cured meat, such as corned beef, camargo, ham, hot dogs, and sausage    ¨ Canned meats or spreads, such as potted meats, sardines, anchovies, and imitation seafood    ¨ Deli or lunch meats, such as bologna, ham, turkey, and roast beef    ¨ Processed cheese, such as American cheese and cheese spreads    · Condiments, sauces, and seasonings:      ¨ Salt (¼ teaspoon of salt contains 575 mg of sodium)    ¨ Seasonings made with salt, such as garlic salt, celery salt, onion salt, and seasoned salt    ¨ Regular soy sauce, barbecue sauce, teriyaki sauce, steak sauce, Worcestershire sauce, and most flavored vinegars    ¨ Canned gravy and mixes     ¨ Regular condiments, such as mustard, ketchup, and salad dressings    ¨ Pickles and olives    ¨ Meat tenderizers and monosodium glutamate (MSG)  Which foods can I include? Read the food label to find the amount of sodium in each serving  · Bread and cereal:  Try to choose breads with less than 80 mg of sodium per serving  ¨ Bread, roll, taz, tortilla, or unsalted crackers  ¨ Ready-to-eat cereals with less than 5% DV of sodium (examples include shredded wheat and puffed rice)    ¨ Pasta    · Vegetables and fruits:      ¨ Unsalted fresh, frozen, or canned vegetables    ¨ Fresh, frozen, or canned fruits    ¨ Fruit juice    · Dairy:  One serving has about 150 mg of sodium  ¨ Milk, all types    ¨ Yogurt    ¨ Hard cheese, such as cheddar, Swiss, [employer], or mozzarella    · Meat and other protein foods:  Some raw meats may have added sodium  ¨ Plain meats, fish, and poultry     ¨ Egg    · Other foods:      ¨ Homemade pudding    ¨ Unsalted nuts, popcorn, or pretzels    ¨ Unsalted butter or margarine  What are some ways that I can decrease sodium? · Add spices and herbs to foods instead of salt during cooking  Use salt-free seasonings to add flavor to foods  Examples include onion powder, garlic powder, basil, prater powder, paprika, and parsley  Try lemon or lime juice or vinegar to give foods a tart flavor  Use hot peppers, pepper, or cayenne pepper to add a spicy flavor to foods  · Do not keep a salt shaker at your kitchen table    This may help keep you from adding salt to food at the table  It may take time to get used to enjoying the natural flavor of food instead of adding salt  Talk to your healthcare provider before you use salt substitutes  Some salt substitutes have a high amount of potassium and need to be avoided if you have kidney disease  · Choose low-sodium foods at restaurants  Meals from restaurants are often high in sodium  Some restaurants have nutrition information on the menu that tells you the amount of sodium in their foods  If possible, ask for your food to be prepared with less, or no salt  · Shop for unsalted or low-sodium foods and snacks at the grocery store  Examples include unsalted or low-sodium broths, soups, and canned vegetables  Choose fresh or frozen vegetables instead  Choose unsalted nuts or seeds or fresh fruits or vegetables as snacks  Read food labels and choose salt-free, very low-sodium, or low-sodium foods  CARE AGREEMENT:   You have the right to help plan your care  Discuss treatment options with your caregivers to decide what care you want to receive  You always have the right to refuse treatment  The above information is an  only  It is not intended as medical advice for individual conditions or treatments  Talk to your doctor, nurse or pharmacist before following any medical regimen to see if it is safe and effective for you  © 2017 [address]  Information is for End User's use only and may not be sold, redistributed or otherwise used for commercial purposes  All illustrations and images included in CareNotes® are the copyrighted property of [employer]  or [employer]  Muscle Cramp   WHAT YOU NEED TO KNOW:   What is a muscle cramp? A muscle cramp is a sudden, sharp pain or spasm in a muscle  It lasts from a few seconds to a few minutes  Muscle cramps most often occur in your legs or feet   They are also common along your ribs and in your arms and hands    What increases my risk for a muscle cramp? A muscle cramp may be caused by tired muscles or failure to stretch properly  The following may increase your risk:  · Exercise, especially in hot or humid weather, or that is new, intense, or lasts a long time    · Pregnancy     · Dehydration     · Medicines, such as cholesterol or diuretic medicine     · Age older than 72 years     · Health conditions, such as kidney or liver failure, or thyroid disease  What are the signs and symptoms of a muscle cramp? · Sudden, sharp muscle pain or squeezing     · Visible twitching or muscle movement     · Hard muscles, or knots in your muscles  How is the cause of a muscle cramp diagnosed? Tell your healthcare provider how often you have muscle cramps and how long they usually last  Tell him if they occur at rest or during exercise  Tell him if they occur during the day or at night  Your healthcare provider will examine you and press on the muscles where you have cramps  You may also need a blood test to check for dehydration and organ function  How can I manage a muscle cramp? Muscle cramps often go away without any treatment  You can do the following to help relieve a cramp:  · Stop the activity  that caused the muscle cramp  · Stretch or massage  your muscle until the cramp goes away  · Apply ice  to sore muscles  Use an ice pack, or put crushed ice in a plastic bag  Cover it with a towel, and place it on your sore muscles for 15 to 20 minutes every hour or as directed  Ice decreases swelling and pain  · Apply heat  to tense, tight muscles for 20 to 30 minutes every 2 hours for as many days as directed  Heat helps decrease pain and muscle spasms  How can I help prevent a muscle cramp? · Stretch your muscles  Stretch 3 times daily, including before bedtime and before exercise  Stretch briefly, and then release each stretch  Do not stretch so far that you feel pain   Daily stretches will relax your muscles and increase flexibility  Ask your healthcare provider for instructions on muscle stretches that are right for you  · Warm up before you exercise  Run in place slowly or walk at a brisk pace to warm your muscles  · Drink liquids as directed  Liquids can help prevent muscle cramps caused by dehydration  Ask your healthcare provider how much liquid to drink each day and which liquids are best for you  You may need to drink liquids that replace lost electrolytes, such as sports drinks  · Eat a variety of healthy foods  Healthy foods may help prevent muscle cramps  Healthy foods include bananas, beans, avocados, or other foods high in electrolytes  Ask if you should eat more carbohydrates  When should I contact my healthcare provider? · Your cramp does not go away, even after you stretch and apply ice or heat  · You have muscle cramps often  · You have questions or concerns about your condition or care  When should I seek immediate care or call 911? · You cannot urinate, or your urine is dark yellow or brown within 24 hours of the cramp  · You have pain in your neck or back  · Your arm or leg is weak or numb, or the area around your cramp is numb  · You have trouble moving your cramped muscle  CARE AGREEMENT:   You have the right to help plan your care  Learn about your health condition and how it may be treated  Discuss treatment options with your caregivers to decide what care you want to receive  You always have the right to refuse treatment  The above information is an  only  It is not intended as medical advice for individual conditions or treatments  Talk to your doctor, nurse or pharmacist before following any medical regimen to see if it is safe and effective for you  © 2017 [facility] Information is for End User's use only and may not be sold, redistributed or otherwise used for commercial purposes   All illustrations and images included in [facility] are the copyrighted property of [employer]  or [employer]

## 2019-08-12 DIAGNOSIS — E03.9 ACQUIRED HYPOTHYROIDISM: ICD-10-CM

## 2019-08-12 RX ORDER — LEVOTHYROXINE SODIUM 112 UG/1
112 TABLET ORAL DAILY
Qty: 90 TABLET | Refills: 3 | Status: SHIPPED | OUTPATIENT
Start: 2019-08-12 | End: 2020-01-27 | Stop reason: SDUPTHER

## 2019-11-07 DIAGNOSIS — I10 ESSENTIAL HYPERTENSION: ICD-10-CM

## 2019-11-11 RX ORDER — HYDROCHLOROTHIAZIDE 12.5 MG/1
CAPSULE, GELATIN COATED ORAL
Qty: 90 CAPSULE | Refills: 1 | Status: SHIPPED | OUTPATIENT
Start: 2019-11-11 | End: 2020-01-27 | Stop reason: SDUPTHER

## 2020-01-15 ENCOUNTER — TELEPHONE (OUTPATIENT)
Dept: FAMILY MEDICINE CLINIC | Facility: CLINIC | Age: 65
End: 2020-01-15

## 2020-01-15 DIAGNOSIS — Z13.1 SCREENING FOR DIABETES MELLITUS: ICD-10-CM

## 2020-01-15 DIAGNOSIS — E55.9 VITAMIN D DEFICIENCY: ICD-10-CM

## 2020-01-15 DIAGNOSIS — E03.9 ACQUIRED HYPOTHYROIDISM: ICD-10-CM

## 2020-01-15 DIAGNOSIS — E53.8 VITAMIN B12 DEFICIENCY: ICD-10-CM

## 2020-01-15 DIAGNOSIS — E78.2 MIXED HYPERLIPIDEMIA: ICD-10-CM

## 2020-01-15 DIAGNOSIS — Z13.0 SCREENING FOR DEFICIENCY ANEMIA: Primary | ICD-10-CM

## 2020-01-16 LAB — HBA1C MFR BLD HPLC: 5.5 %

## 2020-01-20 ENCOUNTER — TELEPHONE (OUTPATIENT)
Dept: FAMILY MEDICINE CLINIC | Facility: CLINIC | Age: 65
End: 2020-01-20

## 2020-01-20 DIAGNOSIS — E78.49 OTHER HYPERLIPIDEMIA: ICD-10-CM

## 2020-01-20 RX ORDER — ATORVASTATIN CALCIUM 40 MG/1
40 TABLET, FILM COATED ORAL DAILY
Qty: 90 TABLET | Refills: 1 | Status: SHIPPED | OUTPATIENT
Start: 2020-01-20 | End: 2020-06-03 | Stop reason: SDUPTHER

## 2020-01-27 ENCOUNTER — OFFICE VISIT (OUTPATIENT)
Dept: FAMILY MEDICINE CLINIC | Facility: CLINIC | Age: 65
End: 2020-01-27
Payer: COMMERCIAL

## 2020-01-27 VITALS
HEART RATE: 63 BPM | OXYGEN SATURATION: 98 % | BODY MASS INDEX: 40.71 KG/M2 | SYSTOLIC BLOOD PRESSURE: 118 MMHG | WEIGHT: 268.6 LBS | RESPIRATION RATE: 18 BRPM | HEIGHT: 68 IN | TEMPERATURE: 97.5 F | DIASTOLIC BLOOD PRESSURE: 66 MMHG

## 2020-01-27 DIAGNOSIS — E78.49 OTHER HYPERLIPIDEMIA: ICD-10-CM

## 2020-01-27 DIAGNOSIS — E55.9 VITAMIN D DEFICIENCY: ICD-10-CM

## 2020-01-27 DIAGNOSIS — D64.9 ANEMIA, UNSPECIFIED TYPE: ICD-10-CM

## 2020-01-27 DIAGNOSIS — K92.1 BLOOD IN STOOL: ICD-10-CM

## 2020-01-27 DIAGNOSIS — E03.9 ACQUIRED HYPOTHYROIDISM: ICD-10-CM

## 2020-01-27 DIAGNOSIS — M89.8X1 PAIN OF LEFT SCAPULA: ICD-10-CM

## 2020-01-27 DIAGNOSIS — M25.512 ACUTE PAIN OF LEFT SHOULDER: ICD-10-CM

## 2020-01-27 DIAGNOSIS — S76.911A MUSCLE STRAIN OF RIGHT THIGH, INITIAL ENCOUNTER: ICD-10-CM

## 2020-01-27 DIAGNOSIS — R10.32 LEFT LOWER QUADRANT ABDOMINAL PAIN: Primary | ICD-10-CM

## 2020-01-27 DIAGNOSIS — E53.8 VITAMIN B12 DEFICIENCY: ICD-10-CM

## 2020-01-27 DIAGNOSIS — I10 ESSENTIAL HYPERTENSION: ICD-10-CM

## 2020-01-27 DIAGNOSIS — K57.92 DIVERTICULITIS: ICD-10-CM

## 2020-01-27 DIAGNOSIS — R25.2 LEG CRAMPS: ICD-10-CM

## 2020-01-27 DIAGNOSIS — D22.9 ATYPICAL NEVI: ICD-10-CM

## 2020-01-27 DIAGNOSIS — J34.89 LESION OF NOSE: ICD-10-CM

## 2020-01-27 DIAGNOSIS — M25.552 LEFT HIP PAIN: ICD-10-CM

## 2020-01-27 DIAGNOSIS — M79.672 LEFT FOOT PAIN: ICD-10-CM

## 2020-01-27 DIAGNOSIS — D36.7 DERMOID CYST OF NECK: ICD-10-CM

## 2020-01-27 DIAGNOSIS — R73.9 HYPERGLYCEMIA: ICD-10-CM

## 2020-01-27 DIAGNOSIS — R20.2 TINGLING: ICD-10-CM

## 2020-01-27 DIAGNOSIS — M79.602 LEFT ARM PAIN: ICD-10-CM

## 2020-01-27 DIAGNOSIS — R07.9 LEFT-SIDED CHEST PAIN: ICD-10-CM

## 2020-01-27 DIAGNOSIS — K21.00 GASTROESOPHAGEAL REFLUX DISEASE WITH ESOPHAGITIS: ICD-10-CM

## 2020-01-27 PROCEDURE — 3288F FALL RISK ASSESSMENT DOCD: CPT | Performed by: NURSE PRACTITIONER

## 2020-01-27 PROCEDURE — 3074F SYST BP LT 130 MM HG: CPT | Performed by: NURSE PRACTITIONER

## 2020-01-27 PROCEDURE — 1101F PT FALLS ASSESS-DOCD LE1/YR: CPT | Performed by: NURSE PRACTITIONER

## 2020-01-27 PROCEDURE — 99214 OFFICE O/P EST MOD 30 MIN: CPT | Performed by: NURSE PRACTITIONER

## 2020-01-27 PROCEDURE — 1160F RVW MEDS BY RX/DR IN RCRD: CPT | Performed by: NURSE PRACTITIONER

## 2020-01-27 PROCEDURE — 3008F BODY MASS INDEX DOCD: CPT | Performed by: NURSE PRACTITIONER

## 2020-01-27 PROCEDURE — 3078F DIAST BP <80 MM HG: CPT | Performed by: NURSE PRACTITIONER

## 2020-01-27 RX ORDER — TIZANIDINE 4 MG/1
TABLET ORAL
Qty: 60 TABLET | Refills: 2 | Status: SHIPPED | OUTPATIENT
Start: 2020-01-27 | End: 2020-11-16 | Stop reason: SDUPTHER

## 2020-01-27 RX ORDER — FAMOTIDINE 20 MG/1
20 TABLET, FILM COATED ORAL 2 TIMES DAILY
Qty: 180 TABLET | Refills: 1 | Status: SHIPPED | OUTPATIENT
Start: 2020-01-27 | End: 2020-01-29 | Stop reason: CLARIF

## 2020-01-27 RX ORDER — ERGOCALCIFEROL 1.25 MG/1
50000 CAPSULE ORAL WEEKLY
Qty: 12 CAPSULE | Refills: 1 | Status: SHIPPED | OUTPATIENT
Start: 2020-01-27 | End: 2020-07-27

## 2020-01-27 RX ORDER — CYANOCOBALAMIN (VITAMIN B-12) 1000 MCG
1000 TABLET ORAL EVERY OTHER DAY
Qty: 90 TABLET | Refills: 1 | Status: SHIPPED | OUTPATIENT
Start: 2020-01-27 | End: 2020-08-07 | Stop reason: SDUPTHER

## 2020-01-27 RX ORDER — LEVOTHYROXINE SODIUM 112 UG/1
112 TABLET ORAL DAILY
Qty: 90 TABLET | Refills: 3 | Status: SHIPPED | OUTPATIENT
Start: 2020-01-27 | End: 2020-08-07

## 2020-01-27 RX ORDER — HYDROCHLOROTHIAZIDE 12.5 MG/1
12.5 CAPSULE, GELATIN COATED ORAL DAILY
Qty: 90 CAPSULE | Refills: 1 | Status: SHIPPED | OUTPATIENT
Start: 2020-01-27 | End: 2020-06-03 | Stop reason: SDUPTHER

## 2020-01-27 RX ORDER — PREDNISONE 20 MG/1
TABLET ORAL
Qty: 30 TABLET | Refills: 2 | Status: SHIPPED | OUTPATIENT
Start: 2020-01-27 | End: 2020-02-11 | Stop reason: ALTCHOICE

## 2020-01-27 NOTE — PATIENT INSTRUCTIONS
Wellness Visit for Adults   WHAT YOU NEED TO KNOW:   What is a wellness visit? A wellness visit is when you see your healthcare provider to get screened for health problems  You can also get advice on how to stay healthy  Write down your questions so you remember to ask them  Ask your healthcare provider how often you should have a wellness visit  What happens at a wellness visit? Your healthcare provider will ask about your health, and your family history of health problems  This includes high blood pressure, heart disease, and cancer  He or she will ask if you have symptoms that concern you, if you smoke, and about your mood  You may also be asked about your intake of medicines, supplements, food, and alcohol  Any of the following may be done:  · Your weight  will be checked  Your height may also be checked so your body mass index (BMI) can be calculated  Your BMI shows if you are at a healthy weight  · Your blood pressure  and heart rate will be checked  Your temperature may also be checked  · Blood and urine tests  may be done  Blood tests may be done to check your cholesterol levels  Abnormal cholesterol levels increase your risk for heart disease and stroke  You may also need a blood or urine test to check for diabetes if you are at increased risk  Urine tests may be done to look for signs of an infection or kidney disease  · A physical exam  includes checking your heartbeat and lungs with a stethoscope  Your healthcare provider may also check your skin to look for sun damage  · Screening tests  may be recommended  A screening test is done to check for diseases that may not cause symptoms  The screening tests you may need depend on your age, gender, family history, and lifestyle habits  For example, colorectal screening may be recommended if you are 48years old or older  What screening tests do I need if I am a woman? · A Pap smear  is used to screen for cervical cancer   Pap smears are usually done every 3 to 5 years depending on your age  You may need them more often if you have had abnormal Pap smear test results in the past  Ask your healthcare provider how often you should have a Pap smear  · A mammogram  is an x-ray of your breasts to screen for breast cancer  Experts recommend mammograms every 2 years starting at age 48 years  You may need a mammogram at age 52 years or younger if you have an increased risk for breast cancer  Talk to your healthcare provider about when you should start having mammograms and how often you need them  What vaccines might I need? · Get an influenza vaccine  every year  The influenza vaccine protects you from the flu  Several types of viruses cause the flu  The viruses change over time, so new vaccines are made each year  · Get a tetanus-diphtheria (Td) booster vaccine  every 10 years  This vaccine protects you against tetanus and diphtheria  Tetanus is a severe infection that may cause painful muscle spasms and lockjaw  Diphtheria is a severe bacterial infection that causes a thick covering in the back of your mouth and throat  · Get a human papillomavirus (HPV) vaccine  if you are female and aged 23 to 32 or male 23 to 24 and never received it  This vaccine protects you from HPV infection  HPV is the most common infection spread by sexual contact  HPV may also cause vaginal, penile, and anal cancers  · Get a pneumococcal vaccine  if you are aged 72 years or older  The pneumococcal vaccine is an injection given to protect you from pneumococcal disease  Pneumococcal disease is an infection caused by pneumococcal bacteria  The infection may cause pneumonia, meningitis, or an ear infection  · Get a shingles vaccine  if you are aged 61 or older, even if you have had shingles before  The shingles vaccine is an injection to protect you from the varicella-zoster virus  This is the same virus that causes chickenpox   Shingles is a painful rash that develops in people who had chickenpox or have been exposed to the virus  How can I eat healthy? My Plate is a model for planning healthy meals  It shows the types and amounts of foods that should go on your plate  Fruits and vegetables make up about half of your plate, and grains and protein make up the other half  A serving of dairy is included on the side of your plate  The amount of calories and serving sizes you need depends on your age, gender, weight, and height  Examples of healthy foods are listed below:  · Eat a variety of vegetables  such as dark green, red, and orange vegetables  You can also include canned vegetables low in sodium (salt) and frozen vegetables without added butter or sauces  · Eat a variety of fresh fruits , canned fruit in 100% juice, frozen fruit, and dried fruit  · Include whole grains  At least half of the grains you eat should be whole grains  Examples include whole-wheat bread, wheat pasta, brown rice, and whole-grain cereals such as oatmeal     · Eat a variety of protein foods such as seafood (fish and shellfish), lean meat, and poultry without skin (turkey and chicken)  Examples of lean meats include pork leg, shoulder, or tenderloin, and beef round, sirloin, tenderloin, and extra lean ground beef  Other protein foods include eggs and egg substitutes, beans, peas, soy products, nuts, and seeds  · Choose low-fat dairy products such as skim or 1% milk or low-fat yogurt, cheese, and cottage cheese  · Limit unhealthy fats  such as butter, hard margarine, and shortening  How much exercise do I need? Exercise at least 30 minutes per day on most days of the week  Some examples of exercise include walking, biking, dancing, and swimming  You can also fit in more physical activity by taking the stairs instead of the elevator or parking farther away from stores  Include muscle strengthening activities 2 days each week  Regular exercise provides many health benefits  It helps you manage your weight, and decreases your risk for type 2 diabetes, heart disease, stroke, and high blood pressure  Exercise can also help improve your mood  Ask your healthcare provider about the best exercise plan for you  What are some general health and safety guidelines I should follow? · Do not smoke  Nicotine and other chemicals in cigarettes and cigars can cause lung damage  Ask your healthcare provider for information if you currently smoke and need help to quit  E-cigarettes or smokeless tobacco still contain nicotine  Talk to your healthcare provider before you use these products  · Limit alcohol  A drink of alcohol is 12 ounces of beer, 5 ounces of wine, or 1½ ounces of liquor  · Lose weight, if needed  Being overweight increases your risk of certain health conditions  These include heart disease, high blood pressure, type 2 diabetes, and certain types of cancer  · Protect your skin  Do not sunbathe or use tanning beds  Use sunscreen with a SPF 15 or higher  Apply sunscreen at least 15 minutes before you go outside  Reapply sunscreen every 2 hours  Wear protective clothing, hats, and sunglasses when you are outside  · Drive safely  Always wear your seatbelt  Make sure everyone in your car wears a seatbelt  A seatbelt can save your life if you are in an accident  Do not use your cell phone when you are driving  This could distract you and cause an accident  Pull over if you need to make a call or send a text message  · Practice safe sex  Use latex condoms if are sexually active and have more than one partner  Your healthcare provider may recommend screening tests for sexually transmitted infections (STIs)  · Wear helmets, lifejackets, and protective gear  Always wear a helmet when you ride a bike or motorcycle, go skiing, or play sports that could cause a head injury  Wear protective equipment when you play sports   Wear a lifejacket when you are on a boat or doing water sports  CARE AGREEMENT:   You have the right to help plan your care  Learn about your health condition and how it may be treated  Discuss treatment options with your caregivers to decide what care you want to receive  You always have the right to refuse treatment  The above information is an  only  It is not intended as medical advice for individual conditions or treatments  Talk to your doctor, nurse or pharmacist before following any medical regimen to see if it is safe and effective for you  © 2017 [employer]  Information is for End User's use only and may not be sold, redistributed or otherwise used for commercial purposes  All illustrations and images included in CareNotes® are the copyrighted property of [employer]  or [employer]  Low-Sodium Diet   WHAT YOU NEED TO KNOW:   A low-sodium diet limits foods that are high in sodium (salt)  You will need to follow a low-sodium diet if you have high blood pressure, kidney disease, or heart failure  You may also need to follow this diet if you have a condition that is causing your body to retain (hold) extra fluid  You may need to limit the amount of sodium you eat to 1,500 mg  Ask your healthcare provider how much sodium you can have each day  DISCHARGE INSTRUCTIONS:   How to use food labels to choose foods that are low in sodium:  Read food labels to find the amount of sodium they contain  The amount of sodium is listed in milligrams (mg)  The % Daily Value (DV) column tells you how much of your daily needs are met by 1 serving of the food for each nutrient listed  Choose foods that have less than 5% of the DV of sodium  These foods are considered low in sodium  Foods that have 20% or more of the DV of sodium are considered high in sodium   Some food labels may also list any of the following terms that tell you about the sodium content in the food:  · Sodium-free:  Less than 5 mg in each serving    · Very low sodium:  35 mg of sodium or less in each serving    · Low sodium:  140 mg of sodium or less in each serving    · Reduced sodium: At least 25% less sodium in each serving than the regular type    · Light in sodium:  50% less sodium in each serving    · Unsalted or no added salt:  No extra salt is added during processing (the food may still contain sodium)  Foods to avoid:  Salty foods are high in sodium   You should avoid the following:  · Processed foods:      ¨ Mixes for cornbread, biscuits, cake, and pudding     ¨ Instant foods, such as potatoes, cereals, noodles, and rice     ¨ Packaged foods, such as bread stuffing, rice and pasta mixes, snack dip mixes, and macaroni and cheese     ¨ Canned foods, such as canned vegetables, soups, broths, sauces, and vegetable or tomato juice    ¨ Snack foods, such as salted chips, popcorn, pretzels, pork rinds, salted crackers, and salted nuts    ¨ Frozen foods, such as dinners, entrees, vegetables with sauces, and breaded meats    ¨ Sauerkraut, pickled vegetables, and other foods prepared in brine    · Meats and cheeses:      ¨ Smoked or cured meat, such as corned beef, [place], ham, hot dogs, and sausage    ¨ Canned meats or spreads, such as potted meats, sardines, anchovies, and imitation seafood    ¨ Deli or lunch meats, such as bologna, ham, turkey, and roast beef    ¨ Processed cheese, such as American cheese and cheese spreads    · Condiments, sauces, and seasonings:      ¨ Salt (¼ teaspoon of salt contains 575 mg of sodium)    ¨ Seasonings made with salt, such as garlic salt, celery salt, onion salt, and seasoned salt    ¨ Regular soy sauce, barbecue sauce, teriyaki sauce, steak sauce, Worcestershire sauce, and most flavored vinegars    ¨ Canned gravy and mixes     ¨ Regular condiments, such as mustard, ketchup, and salad dressings    ¨ Pickles and olives    ¨ Meat tenderizers and monosodium glutamate (MSG)  Foods to include:  Read the food label to find the amount of sodium in each serving  · Bread and cereal:  Try to choose breads with less than 80 mg of sodium per serving  ¨ Bread, roll, taz, tortilla, or unsalted crackers  ¨ Ready-to-eat cereals with less than 5% DV of sodium (examples include shredded wheat and puffed rice)    ¨ Pasta    · Vegetables and fruits:      ¨ Unsalted fresh, frozen, or canned vegetables    ¨ Fresh, frozen, or canned fruits    ¨ Fruit juice    · Dairy:  One serving has about 150 mg of sodium  ¨ Milk, all types    ¨ Yogurt    ¨ Hard cheese, such as cheddar, Swiss, [employer], or mozzarella    · Meat and other protein foods:  Some raw meats may have added sodium  ¨ Plain meats, fish, and poultry     ¨ Egg    · Other foods:      ¨ Homemade pudding    ¨ Unsalted nuts, popcorn, or pretzels    ¨ Unsalted butter or margarine  Ways to decrease sodium:   · Add spices and herbs to foods instead of salt during cooking  Use salt-free seasonings to add flavor to foods  Examples include onion powder, garlic powder, basil, prater powder, paprika, and parsley  Try lemon or lime juice or vinegar to give foods a tart flavor  Use hot peppers, pepper, or cayenne pepper to add a spicy flavor to foods  · Do not keep a salt shaker at your kitchen table  This may help keep you from adding salt to food at the table  It may take time to get used to enjoying the natural flavor of food instead of adding salt  Talk to your healthcare provider before you use salt substitutes  Some salt substitutes have a high amount of potassium and need to be avoided if you have kidney disease  · Choose low-sodium foods at restaurants  Meals from restaurants are often high in sodium  Some restaurants have nutrition information on the menu that tells you the amount of sodium in their foods  If possible, ask for your food to be prepared with less, or no salt  · Shop for unsalted or low-sodium foods and snacks at the grocery store    Examples include unsalted or low-sodium broths, soups, and canned vegetables  Choose fresh or frozen vegetables instead  Choose unsalted nuts or seeds or fresh fruits or vegetables as snacks  Read food labels and choose salt-free, very low-sodium, or low-sodium foods  © 2017 [employer] [employer] [place] Information is for End User's use only and may not be sold, redistributed or otherwise used for commercial purposes  All illustrations and images included in CareNotes® are the copyrighted property of [employer]  or [employer]  The above information is an  only  It is not intended as medical advice for individual conditions or treatments  Talk to your doctor, nurse or pharmacist before following any medical regimen to see if it is safe and effective for you

## 2020-01-27 NOTE — PROGRESS NOTES
Assessment/Plan:      Diagnoses and all orders for this visit:    Left lower quadrant abdominal pain  -     Ambulatory referral to Gastroenterology; Future    Acquired hypothyroidism  -     levothyroxine 112 mcg tablet; Take 1 tablet (112 mcg total) by mouth daily  -     TSH, 3rd generation; Future  -     magnesium oxide (MAG-OX) 400 mg; Take 1 tablet (400 mg total) by mouth daily at bedtime    Gastroesophageal reflux disease with esophagitis  -     famotidine (PEPCID) 20 mg tablet; Take 1 tablet (20 mg total) by mouth 2 (two) times a day    Essential hypertension  -     hydrochlorothiazide (MICROZIDE) 12 5 mg capsule; Take 1 capsule (12 5 mg total) by mouth daily  -     Ambulatory referral to Cardiology; Future    Other hyperlipidemia  -     Lipid panel; Future  -     Lipid panel; Future  -     Ambulatory referral to Cardiology; Future    Vitamin D deficiency  -     ergocalciferol (VITAMIN D2) 50,000 units; Take 1 capsule (50,000 Units total) by mouth once a week  -     Vitamin D 25 hydroxy; Future    Vitamin B12 deficiency  -     KP VITAMIN B-12 1000 MCG tablet; Take 1 tablet (1,000 mcg total) by mouth every other day  -     Vitamin B12; Future    Diverticulitis  -     Ambulatory referral to Gastroenterology; Future    Blood in stool  -     Ambulatory referral to Gastroenterology; Future    Pain of left scapula  -     Ambulatory referral to Cardiology; Future  -     XR scapula left; Future  -     XR shoulder 2+ vw left; Future    Left foot pain  -     XR foot 3+ vw left; Future    Tingling    Left hip pain  -     diclofenac sodium (VOLTAREN) 50 mg EC tablet; Take 1 tablet (50 mg total) by mouth 2 (two) times a day    Anemia, unspecified type  -     CBC and differential; Future    Hyperglycemia  -     Comprehensive metabolic panel; Future  -     Hemoglobin A1C; Future    Dermoid cyst of neck  -     Ambulatory referral to Dermatology;  Future    Lesion of nose  -     Ambulatory referral to Dermatology; Future    Atypical nevi  -     Ambulatory referral to Dermatology; Future    Left arm pain  -     Ambulatory referral to Cardiology; Future    Left-sided chest pain  -     Ambulatory referral to Cardiology; Future    Leg cramps  -     magnesium oxide (MAG-OX) 400 mg; Take 1 tablet (400 mg total) by mouth daily at bedtime    Muscle strain of right thigh, initial encounter  -     predniSONE 20 mg tablet; 1 tab po 3 x daily x 3 days then 1 tab po 2 x daily x 3 days then 1 tab po daily x 3 days with food  -     tiZANidine (ZANAFLEX) 4 mg tablet; 1 tab po every 8 hours x 7 days then prn spasms or pain    Acute pain of left shoulder  -     XR scapula left; Future  -     XR shoulder 2+ vw left; Future          Subjective:     Patient ID: Morelia Zamorano is a 71 y o  female  Patient presents to office for follow up and recheck  Complete medical history and medications reviewed with patient and tolerating all medications well without any problems  Patient would like some red spots on the nose and brown nevi of face checked out  C/O small mobile white cyst on her right posterior neck ongoing for the past 3-4 months and has increased in size  C/O seeing blood and mucous in toilet after having a bowel movement on 1/3/20 and has not noticed any since this occurrence  Patient states she passed gas in July 2019 and had a bloody mucous plug come out of rectum that was foul smelling  C/O sharp pain in left lower abdomen that started last month  C/O numbness sensation in right thigh radiating up into left lower abdomen/pelvic area occurring with tingling sensation of pins and needles that comes and goes  C/O left foot pain that feels like a rubber band is around her foot  C/O left scapula pain radiating down left arm and wraps around to left chest   Requesting refills on her Levothyroxine, Diclofenac, Hydrochlorothiazide  Denies any other problems or concerns at the present time   Patient states she has not been taking her Lipitor for several months due to ran out and states she felt it was causing her leg pains  Patient states she has not been taking her Vitamin B12 supplement either  Scheduled for Mammogram 2/20/20 and had annual gyn exam done by Dr Juan J Jimenez in Jan 2020  Review of Systems    GENERAL:  Feels well, denies any significant changes in weight without trying  SKIN: Red spot on nose, cyst of right posterior neck, and dark brown nevi of face that she noticed  HEENT:  Denies any head injury or headaches  Negative blurred vision, floaters, spots before eyes, infections, or other vision problems  Negative significant changes in vision or hearing  Negative tinnitus, vertigo, or infections  Negative hay fever, sinus trouble, nasal discharge, bloody noses, or problems with smell  Negative sore throat, bleeding gums, ulcers, or sores  NECK: No swollen glands  BREASTS:  Denies lumps, pain, nipple discharge, swelling, redness, or any other changes  RESPIRATORY:  Denies cough, wheezing, shortness of breath, dyspnea, or orthopnea  CARDIOVASCULAR:  Denies chest pain or palpitations  GASTROINTESTINAL:  Appetite good, denies nausea, vomiting, or indigestion  Bowel movements normal occurring about once daily or every other day  C/O blood and mucous seen in toilet after BM on 1/3/20 and has not noticed any of this since  C/O sharp left lower abdominal pain occurring  URINARY:  Denies frequency, urgency, incontinence, dysuria, hematuria, nocturia, or recent flank pain  GENITAL:  Denies vaginal discharge, pelvic infection, lesions, ulcers, or pain  Negative dyspareunia or abnormal vaginal bleeding  PERIPHERAL VASCULAR:  Denies varicosities, swelling, skin changes, or pain  MUSCULOSKELETAL:  C/O left foot pain that feels like a rubber band around foot  C/O left scapula pain radiating around to left chest and down left arm occurring      PSYCHIATRIC:  Denies problems with depression, anxiety, anger, or other psychiatric symptoms  NEUROLOGIC:  C/O tingling, pins and needles sensation occurring in right thigh radiating up into right lower abdomen/pelvic area  HEMATOLOGIC:  Denies easy bruising, bleeding, or anemia  ENDOCRINE:  Denies thyroid problems, temperature intolerance, excessive sweating, or other endocrine symptoms  Objective:     Physical Exam   Nursing note and vitals reviewed  GENERAL:  Appears well nourished, well groomed, in no acute distress  SKIN: Reddened lesion of nose, brown nevi of nose, dark spots of face, 0 25 cm x 0 25 cm white cyst of right posterior neck  HEAD:  Hair is average texture  Scalp without lesions, normocephalic, and atraumatic  EARS:  B/L ear canals clear  B/L TMs clear with + light reflex  NOSE: Mucosa pink, moist, septum midline  Negative sinus tenderness  B/L turbinates pink, moist, non-edematous without exudate  MOUTH:  Oral mucosa pink  Pharynx pink, moist, without swelling, redness, or exudate  Dentition ok  Tonsils without enlargement or exudate  Tongue midline  NECK:  Supple, trachea midline, Negative thyromegaly, lymphadenopathy, or swollen glands  LYMPH NODES:  Negative enlargement of neck, axillary, epitrochlear, or inguinal nodes  THORAX/LUNGS  Thorax symmetric with good excursion  Lungs resonant  Breath sounds vesicular with no added sounds  Diaphragm descends within normal limits  CARDIOVASCULAR:  Carotid upstrokes brisk and without bruits  Apical impulse discrete and tapping, barely palpable in the 5th ICS/MCL  Normal S1 and Normal S2, Negative S3 or S4  Negative murmurs, thrills, lifts, or heaves  ABDOMEN:  Protuberant, bowel sounds normal active x 4 quadrants  Negative tenderness  Negative masses  Negative hepatomegaly  Negative splenomegaly  Negative costovertebral tenderness  EXTREMITIES:  Warm, calves supple, + tenderness of left scapula upon palpation and with Active ROM  Negative stasis pigmentation or ulcers    +2 pulses throughout  MUSCULOSKELETAL:  Negative joint deformities  Good range of motion in hands, wrists, elbows, shoulders, spine, hips, knees, and ankles  + tenderness and strain of right thigh muscle  NEUROLOGICAL:  Mental status:  Awake, alert, and oriented to person, place, time, and event  Normal thought processes  Marlen Camara

## 2020-01-29 DIAGNOSIS — K21.9 GASTROESOPHAGEAL REFLUX DISEASE, ESOPHAGITIS PRESENCE NOT SPECIFIED: Primary | ICD-10-CM

## 2020-01-29 RX ORDER — PANTOPRAZOLE SODIUM 40 MG/1
40 TABLET, DELAYED RELEASE ORAL DAILY
Qty: 90 TABLET | Refills: 1 | Status: SHIPPED | OUTPATIENT
Start: 2020-01-29 | End: 2021-03-17 | Stop reason: SDUPTHER

## 2020-02-10 ENCOUNTER — TELEPHONE (OUTPATIENT)
Dept: FAMILY MEDICINE CLINIC | Facility: CLINIC | Age: 65
End: 2020-02-10

## 2020-02-11 ENCOUNTER — TELEPHONE (OUTPATIENT)
Dept: FAMILY MEDICINE CLINIC | Facility: CLINIC | Age: 65
End: 2020-02-11

## 2020-02-11 DIAGNOSIS — J11.1 INFLUENZA: Primary | ICD-10-CM

## 2020-02-11 RX ORDER — OSELTAMIVIR PHOSPHATE 75 MG/1
75 CAPSULE ORAL EVERY 12 HOURS SCHEDULED
Qty: 10 CAPSULE | Refills: 0 | Status: SHIPPED | OUTPATIENT
Start: 2020-02-11 | End: 2020-02-16

## 2020-02-11 RX ORDER — METHYLPREDNISOLONE 4 MG/1
TABLET ORAL
Qty: 21 EACH | Refills: 0 | Status: SHIPPED | OUTPATIENT
Start: 2020-02-11 | End: 2020-02-24 | Stop reason: ALTCHOICE

## 2020-02-11 NOTE — TELEPHONE ENCOUNTER
Please notify patient her X-Ray of Foot showed Arthritis and X-Ray of Left Shoulder showed arthritis also no fractures

## 2020-02-20 ENCOUNTER — TELEPHONE (OUTPATIENT)
Dept: FAMILY MEDICINE CLINIC | Facility: CLINIC | Age: 65
End: 2020-02-20

## 2020-02-20 DIAGNOSIS — Z20.89 EXPOSURE TO PNEUMONIA: ICD-10-CM

## 2020-02-20 DIAGNOSIS — R09.89 CHEST CONGESTION: ICD-10-CM

## 2020-02-20 DIAGNOSIS — J40 BRONCHITIS: Primary | ICD-10-CM

## 2020-02-20 RX ORDER — AMOXICILLIN AND CLAVULANATE POTASSIUM 875; 125 MG/1; MG/1
1 TABLET, FILM COATED ORAL EVERY 12 HOURS SCHEDULED
Qty: 20 TABLET | Refills: 0 | Status: SHIPPED | OUTPATIENT
Start: 2020-02-20 | End: 2020-03-01

## 2020-02-24 ENCOUNTER — CONSULT (OUTPATIENT)
Dept: CARDIOLOGY CLINIC | Facility: HOSPITAL | Age: 65
End: 2020-02-24
Payer: MEDICARE

## 2020-02-24 VITALS
HEART RATE: 73 BPM | HEIGHT: 68 IN | SYSTOLIC BLOOD PRESSURE: 130 MMHG | BODY MASS INDEX: 41.93 KG/M2 | WEIGHT: 276.68 LBS | DIASTOLIC BLOOD PRESSURE: 84 MMHG

## 2020-02-24 DIAGNOSIS — R07.9 LEFT-SIDED CHEST PAIN: ICD-10-CM

## 2020-02-24 DIAGNOSIS — E78.00 PURE HYPERCHOLESTEROLEMIA: ICD-10-CM

## 2020-02-24 DIAGNOSIS — I10 ESSENTIAL HYPERTENSION: ICD-10-CM

## 2020-02-24 DIAGNOSIS — M79.602 LEFT ARM PAIN: ICD-10-CM

## 2020-02-24 DIAGNOSIS — M89.8X1 PAIN OF LEFT SCAPULA: ICD-10-CM

## 2020-02-24 PROCEDURE — 1160F RVW MEDS BY RX/DR IN RCRD: CPT | Performed by: INTERNAL MEDICINE

## 2020-02-24 PROCEDURE — 3075F SYST BP GE 130 - 139MM HG: CPT | Performed by: INTERNAL MEDICINE

## 2020-02-24 PROCEDURE — 3008F BODY MASS INDEX DOCD: CPT | Performed by: FAMILY MEDICINE

## 2020-02-24 PROCEDURE — 3079F DIAST BP 80-89 MM HG: CPT | Performed by: INTERNAL MEDICINE

## 2020-02-24 PROCEDURE — 99204 OFFICE O/P NEW MOD 45 MIN: CPT | Performed by: INTERNAL MEDICINE

## 2020-02-25 PROCEDURE — 93000 ELECTROCARDIOGRAM COMPLETE: CPT | Performed by: INTERNAL MEDICINE

## 2020-02-25 NOTE — PROGRESS NOTES
Consultation - Cardiology   Linda Hinojosa 76 y o  female MRN: [id number]  Unit/Bed#:  Encounter: [id number]  Physician Requesting Consult: No att  providers found  Reason for Consult / Principal Problem: Cardiac evaluation    Assessment:  1  Atypical L arm pain, L chest pain, back pain in between her scapula  2  HTN  3  Hypercholesterolemia -  LDL    Plan:  Her pains are atypical for myocardial ischemia  I have recommended a regular exercise stress test    Her BP is fairly well controlled  I agree with Lipitor 40 mg daily  This can be increased to 80 mg daily if she tolerates it or she can try Crestor if she cannot tolerate Lipitor at doses to get her LDL at least below 100  If she cannot tolerate any statins, then she would be a candidate for PCSK9- inhibitor drugs if affordable  History of Present Illness     HPI: Linda Hinojosa is a 76y o  year old female who is seen for cardiac evaluation for elevated cholesterol and pains in his L arm, L chest area, and upper back  She is not very active but does not get these pains when walking  They occur intermittently at rest and can be produced by certain movements  She has had 2 previous stress tests for similar pain in the past - apparently unremarkable  BP is 130/84  Recent LDL was 236 and she was started on Lipitor 40 mg daily  She had previously been on Lipitor which controlled her cholesterol level but she stopped taking it because of muscle aches  ECG is normal   She does not smoke  She does have FH of CAD ( her brother had coronary stenting )          Review of Systems:    Alert awake oriented, comfortable, denies any complaints  No fevers chills nausea vomiting  No weakness, dizziness, seizures  no cough, shortness of breath, or wheezing  Denies any palpitations, chest pain, diaphoresis  Denies leg edema, pain or paresthesias  Denies any skin rashes  Denies abdominal pain, bloody stools, masses  Denies any depression or suicidal ideations      Historical Information   Past Medical History:   Diagnosis Date    Allergic rhinitis     Carpal tunnel syndrome on both sides     Cervical disc herniation     DVT (deep venous thrombosis) (HCC)     DVT Left Leg    Fracture of foot     left casted    History of mammogram 2017    Being followed by Dr Earley Castleman History of Papanicolaou smear of cervix 2016    Dr Earley Castleman Hx of colonoscopy 2016    Dr Dell Ordonez Hyperlipemia     Hypertension     Hypothyroidism     Lichen sclerosus     Renal calculi     Rotator cuff tear, left      Past Surgical History:   Procedure Laterality Date    CERVICAL LAMINECTOMY N/A     CHOLECYSTECTOMY N/A     KNEE CARTILAGE SURGERY Bilateral     LITHOTRIPSY N/A     NEUROPLASTY / TRANSPOSITION MEDIAN NERVE AT CARPAL TUNNEL BILATERAL Bilateral     ROTATOR CUFF REPAIR Left     TONSILLECTOMY AND ADENOIDECTOMY Bilateral     TONSILLECTOMY AND ADENOIDECTOMY      TUBAL LIGATION N/A     US GUIDED THYROID BIOPSY  2018    WRIST FRACTURE SURGERY Right     plates and pins      Social History     Substance and Sexual Activity   Alcohol Use No     Social History     Substance and Sexual Activity   Drug Use No     Social History     Tobacco Use   Smoking Status Former Smoker    Packs/day: 0 30    Years: 10 00    Pack years: 3 00    Types: Cigarettes    Last attempt to quit: 2019    Years since quittin 0   Smokeless Tobacco Never Used   Tobacco Comment    quite date 1 year     Family History: non-contributory    Meds/Allergies   all current active meds have been reviewed  No Known Allergies    Objective   Vitals: Blood pressure 130/84, pulse 73, height 5' 8" (1 727 m), weight 126 kg (276 lb 10 8 oz), last menstrual period 2004 , Body mass index is 42 07 kg/m²  ,   Weight (last 2 days)     Date/Time   Weight    20 1422   126 (276 68)                        Physical Exam:  GEN: [place] appears well, alert and oriented x 3, pleasant and cooperative   HEENT: pupils equal, round, and reactive to light; extraocular muscles intact  NECK: supple, no carotid bruits   HEART: regular rhythm, normal S1 and S2, no murmurs, clicks, gallops or rubs   LUNGS: clear to auscultation bilaterally; no wheezes, rales, or rhonchi   ABDOMEN: normal bowel sounds, soft, no tenderness, no distention  EXTREMITIES: peripheral pulses normal; no clubbing, cyanosis, or edema  NEURO: no focal findings   SKIN: normal without suspicious lesions on exposed skin    Lab Results:   Consult on 02/24/2020   Component Date Value Ref Range Status    Interpretation 02/25/2020    Final    NSR  Imaging: I have personally reviewed pertinent reports

## 2020-02-28 ENCOUNTER — HOSPITAL ENCOUNTER (OUTPATIENT)
Dept: NON INVASIVE DIAGNOSTICS | Facility: HOSPITAL | Age: 65
Discharge: HOME | End: 2020-02-28
Attending: INTERNAL MEDICINE
Payer: COMMERCIAL

## 2020-02-28 DIAGNOSIS — R07.9 LEFT-SIDED CHEST PAIN: ICD-10-CM

## 2020-02-28 PROCEDURE — 93016 CV STRESS TEST SUPVJ ONLY: CPT | Performed by: INTERNAL MEDICINE

## 2020-02-28 PROCEDURE — 93017 CV STRESS TEST TRACING ONLY: CPT

## 2020-02-28 PROCEDURE — 93018 CV STRESS TEST I&R ONLY: CPT | Performed by: INTERNAL MEDICINE

## 2020-06-02 DIAGNOSIS — E78.49 OTHER HYPERLIPIDEMIA: ICD-10-CM

## 2020-06-02 DIAGNOSIS — I10 ESSENTIAL HYPERTENSION: ICD-10-CM

## 2020-06-02 RX ORDER — ATORVASTATIN CALCIUM 40 MG/1
40 TABLET, FILM COATED ORAL DAILY
Qty: 90 TABLET | Refills: 1 | Status: CANCELLED | OUTPATIENT
Start: 2020-06-02

## 2020-06-02 RX ORDER — HYDROCHLOROTHIAZIDE 12.5 MG/1
12.5 CAPSULE, GELATIN COATED ORAL DAILY
Qty: 90 CAPSULE | Refills: 1 | Status: CANCELLED | OUTPATIENT
Start: 2020-06-02

## 2020-06-03 DIAGNOSIS — E78.49 OTHER HYPERLIPIDEMIA: ICD-10-CM

## 2020-06-03 DIAGNOSIS — I10 ESSENTIAL HYPERTENSION: ICD-10-CM

## 2020-06-03 RX ORDER — ATORVASTATIN CALCIUM 40 MG/1
40 TABLET, FILM COATED ORAL DAILY
Qty: 90 TABLET | Refills: 1 | Status: SHIPPED | OUTPATIENT
Start: 2020-06-03 | End: 2021-03-17 | Stop reason: SDUPTHER

## 2020-06-03 RX ORDER — HYDROCHLOROTHIAZIDE 12.5 MG/1
12.5 CAPSULE, GELATIN COATED ORAL DAILY
Qty: 90 CAPSULE | Refills: 1 | Status: SHIPPED | OUTPATIENT
Start: 2020-06-03 | End: 2020-06-30 | Stop reason: ALTCHOICE

## 2020-06-30 ENCOUNTER — TELEPHONE (OUTPATIENT)
Dept: FAMILY MEDICINE CLINIC | Facility: CLINIC | Age: 65
End: 2020-06-30

## 2020-06-30 DIAGNOSIS — R29.898 LEG HEAVINESS: ICD-10-CM

## 2020-06-30 DIAGNOSIS — Z86.718 HISTORY OF DVT (DEEP VEIN THROMBOSIS): ICD-10-CM

## 2020-06-30 DIAGNOSIS — M79.604 BILATERAL LEG PAIN: ICD-10-CM

## 2020-06-30 DIAGNOSIS — R22.43 LOCALIZED SWELLING OF BOTH LOWER LEGS: Primary | ICD-10-CM

## 2020-06-30 DIAGNOSIS — M79.605 BILATERAL LEG PAIN: ICD-10-CM

## 2020-06-30 DIAGNOSIS — R60.0 FLUID RETENTION IN LEGS: ICD-10-CM

## 2020-06-30 DIAGNOSIS — R06.02 SOB (SHORTNESS OF BREATH): Primary | ICD-10-CM

## 2020-06-30 DIAGNOSIS — Z00.00 HEALTHCARE MAINTENANCE: ICD-10-CM

## 2020-06-30 RX ORDER — ASPIRIN 81 MG/1
81 TABLET ORAL DAILY
Qty: 90 TABLET | Refills: 1
Start: 2020-06-30

## 2020-06-30 RX ORDER — TRIAMTERENE AND HYDROCHLOROTHIAZIDE 37.5; 25 MG/1; MG/1
1 CAPSULE ORAL EVERY MORNING
Qty: 30 CAPSULE | Refills: 5 | Status: SHIPPED | OUTPATIENT
Start: 2020-06-30 | End: 2020-11-16 | Stop reason: SDUPTHER

## 2020-06-30 NOTE — TELEPHONE ENCOUNTER
Received call from patient stating she is having increased swelling in bilateral legs and feels the HCTZ 12 5mg daily is not helping with the swelling  Patient notices pain and heaviness in bilateral legs with walking and needs to stop to rest   Patient states she also notices SOB with walking upstairs due to swelling  Rx BNP and Rx DOMINIQUE Bilateral Lower Extremities ordered and to be scheduled at [facility] patient to D/C HCTZ and Rx Dyazide si tab po daily #30 #5ef escribed to SAMARIA SUTTON  [facility]  Patient instructed to restart taking her ASA 81mg daily that she had stopped

## 2020-07-10 ENCOUNTER — APPOINTMENT (OUTPATIENT)
Dept: LAB | Facility: MEDICAL CENTER | Age: 65
End: 2020-07-10
Payer: COMMERCIAL

## 2020-07-10 DIAGNOSIS — E03.9 ACQUIRED HYPOTHYROIDISM: ICD-10-CM

## 2020-07-10 DIAGNOSIS — Z13.1 SCREENING FOR DIABETES MELLITUS: ICD-10-CM

## 2020-07-10 DIAGNOSIS — E55.9 VITAMIN D DEFICIENCY: ICD-10-CM

## 2020-07-10 DIAGNOSIS — E53.8 VITAMIN B12 DEFICIENCY: ICD-10-CM

## 2020-07-10 DIAGNOSIS — R60.0 FLUID RETENTION IN LEGS: ICD-10-CM

## 2020-07-10 DIAGNOSIS — R06.02 SOB (SHORTNESS OF BREATH): ICD-10-CM

## 2020-07-10 DIAGNOSIS — E78.49 OTHER HYPERLIPIDEMIA: ICD-10-CM

## 2020-07-10 DIAGNOSIS — R73.9 HYPERGLYCEMIA: ICD-10-CM

## 2020-07-10 DIAGNOSIS — E78.2 MIXED HYPERLIPIDEMIA: ICD-10-CM

## 2020-07-10 DIAGNOSIS — D64.9 ANEMIA, UNSPECIFIED TYPE: ICD-10-CM

## 2020-07-10 LAB
25(OH)D3 SERPL-MCNC: 61.4 NG/ML (ref 30–100)
ALBUMIN SERPL BCP-MCNC: 3.7 G/DL (ref 3.5–5)
ALP SERPL-CCNC: 61 U/L (ref 46–116)
ALT SERPL W P-5'-P-CCNC: 33 U/L (ref 12–78)
ANION GAP SERPL CALCULATED.3IONS-SCNC: 4 MMOL/L (ref 4–13)
AST SERPL W P-5'-P-CCNC: 22 U/L (ref 5–45)
BASOPHILS # BLD AUTO: 0.09 THOUSANDS/ΜL (ref 0–0.1)
BASOPHILS NFR BLD AUTO: 1 % (ref 0–1)
BILIRUB SERPL-MCNC: 0.62 MG/DL (ref 0.2–1)
BUN SERPL-MCNC: 16 MG/DL (ref 5–25)
CALCIUM SERPL-MCNC: 9.5 MG/DL (ref 8.3–10.1)
CHLORIDE SERPL-SCNC: 106 MMOL/L (ref 100–108)
CHOLEST SERPL-MCNC: 180 MG/DL (ref 50–200)
CO2 SERPL-SCNC: 29 MMOL/L (ref 21–32)
CREAT SERPL-MCNC: 1.02 MG/DL (ref 0.6–1.3)
EOSINOPHIL # BLD AUTO: 0.3 THOUSAND/ΜL (ref 0–0.61)
EOSINOPHIL NFR BLD AUTO: 4 % (ref 0–6)
ERYTHROCYTE [DISTWIDTH] IN BLOOD BY AUTOMATED COUNT: 14.3 % (ref 11.6–15.1)
EST. AVERAGE GLUCOSE BLD GHB EST-MCNC: 117 MG/DL
GFR SERPL CREATININE-BSD FRML MDRD: 57 ML/MIN/1.73SQ M
GLUCOSE P FAST SERPL-MCNC: 95 MG/DL (ref 65–99)
HBA1C MFR BLD: 5.7 %
HCT VFR BLD AUTO: 45.3 % (ref 34.8–46.1)
HDLC SERPL-MCNC: 45 MG/DL
HGB BLD-MCNC: 14.6 G/DL (ref 11.5–15.4)
IMM GRANULOCYTES # BLD AUTO: 0.02 THOUSAND/UL (ref 0–0.2)
IMM GRANULOCYTES NFR BLD AUTO: 0 % (ref 0–2)
INSULIN SERPL-ACNC: 23.5 MU/L (ref 3–25)
LDLC SERPL CALC-MCNC: 108 MG/DL (ref 0–100)
LYMPHOCYTES # BLD AUTO: 2.61 THOUSANDS/ΜL (ref 0.6–4.47)
LYMPHOCYTES NFR BLD AUTO: 34 % (ref 14–44)
MCH RBC QN AUTO: 31.4 PG (ref 26.8–34.3)
MCHC RBC AUTO-ENTMCNC: 32.2 G/DL (ref 31.4–37.4)
MCV RBC AUTO: 97 FL (ref 82–98)
MONOCYTES # BLD AUTO: 0.67 THOUSAND/ΜL (ref 0.17–1.22)
MONOCYTES NFR BLD AUTO: 9 % (ref 4–12)
NEUTROPHILS # BLD AUTO: 3.91 THOUSANDS/ΜL (ref 1.85–7.62)
NEUTS SEG NFR BLD AUTO: 52 % (ref 43–75)
NONHDLC SERPL-MCNC: 135 MG/DL
NRBC BLD AUTO-RTO: 0 /100 WBCS
NT-PROBNP SERPL-MCNC: 60 PG/ML
PLATELET # BLD AUTO: 204 THOUSANDS/UL (ref 149–390)
PMV BLD AUTO: 11.4 FL (ref 8.9–12.7)
POTASSIUM SERPL-SCNC: 4 MMOL/L (ref 3.5–5.3)
PROT SERPL-MCNC: 7.7 G/DL (ref 6.4–8.2)
RBC # BLD AUTO: 4.65 MILLION/UL (ref 3.81–5.12)
SODIUM SERPL-SCNC: 139 MMOL/L (ref 136–145)
TRIGL SERPL-MCNC: 136 MG/DL
TSH SERPL DL<=0.05 MIU/L-ACNC: 3.85 UIU/ML (ref 0.36–3.74)
VIT B12 SERPL-MCNC: 611 PG/ML (ref 100–900)
WBC # BLD AUTO: 7.6 THOUSAND/UL (ref 4.31–10.16)

## 2020-07-10 PROCEDURE — 80053 COMPREHEN METABOLIC PANEL: CPT

## 2020-07-10 PROCEDURE — 82607 VITAMIN B-12: CPT

## 2020-07-10 PROCEDURE — 83880 ASSAY OF NATRIURETIC PEPTIDE: CPT

## 2020-07-10 PROCEDURE — 82306 VITAMIN D 25 HYDROXY: CPT

## 2020-07-10 PROCEDURE — 80061 LIPID PANEL: CPT

## 2020-07-10 PROCEDURE — 84443 ASSAY THYROID STIM HORMONE: CPT

## 2020-07-10 PROCEDURE — 36415 COLL VENOUS BLD VENIPUNCTURE: CPT

## 2020-07-10 PROCEDURE — 83036 HEMOGLOBIN GLYCOSYLATED A1C: CPT

## 2020-07-10 PROCEDURE — 85025 COMPLETE CBC W/AUTO DIFF WBC: CPT

## 2020-07-10 PROCEDURE — 83525 ASSAY OF INSULIN: CPT

## 2020-07-27 DIAGNOSIS — E55.9 VITAMIN D DEFICIENCY: ICD-10-CM

## 2020-07-27 RX ORDER — ERGOCALCIFEROL 1.25 MG/1
CAPSULE ORAL
Qty: 12 CAPSULE | Refills: 1 | Status: SHIPPED | OUTPATIENT
Start: 2020-07-27 | End: 2020-08-07 | Stop reason: SDUPTHER

## 2020-08-07 ENCOUNTER — TELEMEDICINE (OUTPATIENT)
Dept: FAMILY MEDICINE CLINIC | Facility: CLINIC | Age: 65
End: 2020-08-07
Payer: COMMERCIAL

## 2020-08-07 DIAGNOSIS — I10 ESSENTIAL HYPERTENSION: ICD-10-CM

## 2020-08-07 DIAGNOSIS — M25.562 PAIN IN BOTH KNEES, UNSPECIFIED CHRONICITY: ICD-10-CM

## 2020-08-07 DIAGNOSIS — M25.561 PAIN IN BOTH KNEES, UNSPECIFIED CHRONICITY: ICD-10-CM

## 2020-08-07 DIAGNOSIS — E78.00 PURE HYPERCHOLESTEROLEMIA: ICD-10-CM

## 2020-08-07 DIAGNOSIS — K21.00 GASTROESOPHAGEAL REFLUX DISEASE WITH ESOPHAGITIS: Primary | ICD-10-CM

## 2020-08-07 DIAGNOSIS — M25.552 LEFT HIP PAIN: ICD-10-CM

## 2020-08-07 DIAGNOSIS — E03.9 ACQUIRED HYPOTHYROIDISM: ICD-10-CM

## 2020-08-07 DIAGNOSIS — E55.9 VITAMIN D DEFICIENCY: ICD-10-CM

## 2020-08-07 DIAGNOSIS — E53.8 VITAMIN B12 DEFICIENCY: ICD-10-CM

## 2020-08-07 PROCEDURE — 3079F DIAST BP 80-89 MM HG: CPT | Performed by: FAMILY MEDICINE

## 2020-08-07 PROCEDURE — 3725F SCREEN DEPRESSION PERFORMED: CPT | Performed by: FAMILY MEDICINE

## 2020-08-07 PROCEDURE — 1160F RVW MEDS BY RX/DR IN RCRD: CPT | Performed by: FAMILY MEDICINE

## 2020-08-07 PROCEDURE — 99214 OFFICE O/P EST MOD 30 MIN: CPT | Performed by: FAMILY MEDICINE

## 2020-08-07 PROCEDURE — 3075F SYST BP GE 130 - 139MM HG: CPT | Performed by: FAMILY MEDICINE

## 2020-08-07 PROCEDURE — 1036F TOBACCO NON-USER: CPT | Performed by: FAMILY MEDICINE

## 2020-08-07 RX ORDER — LEVOTHYROXINE SODIUM 0.12 MG/1
125 TABLET ORAL
Qty: 90 TABLET | Refills: 3 | Status: SHIPPED | OUTPATIENT
Start: 2020-08-07 | End: 2021-03-17 | Stop reason: SDUPTHER

## 2020-08-07 RX ORDER — PREDNISONE 20 MG/1
TABLET ORAL
COMMUNITY
End: 2020-11-16 | Stop reason: ALTCHOICE

## 2020-08-07 RX ORDER — CYANOCOBALAMIN (VITAMIN B-12) 1000 MCG
1000 TABLET ORAL EVERY OTHER DAY
Qty: 90 TABLET | Refills: 1 | Status: SHIPPED | OUTPATIENT
Start: 2020-08-07 | End: 2021-03-17 | Stop reason: SDUPTHER

## 2020-08-07 RX ORDER — FAMOTIDINE 20 MG/1
TABLET, FILM COATED ORAL
COMMUNITY
End: 2021-03-17 | Stop reason: ALTCHOICE

## 2020-08-07 RX ORDER — SODIUM PHOSPHATE,MONO-DIBASIC 19G-7G/118
ENEMA (ML) RECTAL
COMMUNITY
End: 2021-07-19

## 2020-08-07 RX ORDER — ERGOCALCIFEROL 1.25 MG/1
50000 CAPSULE ORAL WEEKLY
Qty: 12 CAPSULE | Refills: 1 | Status: SHIPPED | OUTPATIENT
Start: 2020-08-07 | End: 2021-03-17 | Stop reason: SDUPTHER

## 2020-08-07 RX ORDER — MAGNESIUM OXIDE 400 MG/1
1 TABLET ORAL
COMMUNITY
Start: 2020-05-27 | End: 2020-11-16 | Stop reason: SDUPTHER

## 2020-08-12 ENCOUNTER — TELEPHONE (OUTPATIENT)
Dept: FAMILY MEDICINE CLINIC | Facility: CLINIC | Age: 65
End: 2020-08-12

## 2020-08-12 DIAGNOSIS — M25.552 LEFT HIP PAIN: ICD-10-CM

## 2020-08-12 NOTE — PROGRESS NOTES
Virtual Regular Visit      Assessment/Plan:    Problem List Items Addressed This Visit        Digestive    Gastroesophageal reflux disease with esophagitis - Primary    Relevant Medications    famotidine (PEPCID) 20 mg tablet    magnesium oxide (MAG-OX) 400 mg tablet       Endocrine    Acquired hypothyroidism    Relevant Medications    predniSONE 20 mg tablet    levothyroxine 125 mcg tablet    Other Relevant Orders    TSH, 3rd generation       Cardiovascular and Mediastinum    Essential hypertension       Other    Pure hypercholesterolemia    Vitamin D deficiency    Relevant Medications    ergocalciferol (VITAMIN D2) 50,000 units    Vitamin B12 deficiency    Relevant Medications    KP Vitamin B-12 1000 MCG tablet      Other Visit Diagnoses     Pain in both knees, unspecified chronicity        Relevant Orders    XR knee 4+ vw right injury    XR knee 4+ vw left injury    Left hip pain        Relevant Medications    diclofenac sodium (VOLTAREN) 50 mg EC tablet               Reason for visit is No chief complaint on file  Encounter provider Katelyn Montoya MD    Provider located at [address] [facility] [address] [id number]      Recent Visits  Date Type Provider Dept   08/07/20 Telemedicine Katelyn Montoya MD  [place]    Showing recent visits within past 7 days and meeting all other requirements     Future Appointments  No visits were found meeting these conditions  Showing future appointments within next 150 days and meeting all other requirements        The patient was identified by name and date of birth  Kennedy Menjivar was informed that this is a telemedicine visit and that the visit is being conducted through [employer] and patient was informed that this is not a secure, HIPAA-complaint platform  She agrees to proceed     My office door was closed  No one else was in the room    She acknowledged consent and understanding of privacy and security of the video platform  The patient has agreed to participate and understands they can discontinue the visit at any time  Patient is aware this is a billable service  Subjective  Luz Yousif is a 76 y o  female    Her blood pressure has historically been at goal   She has no chest pain or shortness of breath  She has no headache or vision changes  She has no PND, orthopnea, or dyspnea on exertion  Her lipids are at goal on her current regimen  She has no myalgia or muscle weakness  Her liver function testing is within normal limits  She has hypothyroidism  Her TSH and free T4 are at goal   She has no signs or symptoms of hypo or hyperthyroidism         Past Medical History:   Diagnosis Date    Allergic rhinitis     Carpal tunnel syndrome on both sides     Cervical disc herniation     DVT (deep venous thrombosis) (HCC)     DVT Left Leg    Fracture of foot     left casted    History of mammogram 2017    Being followed by Dr Bogdan Espinosa History of Papanicolaou smear of cervix 2016    Dr Bogdan Espinosa Hx of colonoscopy 2016    Dr Qing Fry Hyperlipemia     Hypertension     Hypothyroidism     Lichen sclerosus     Renal calculi     Rotator cuff tear, left        Past Surgical History:   Procedure Laterality Date    CERVICAL LAMINECTOMY N/A     CHOLECYSTECTOMY N/A     KNEE CARTILAGE SURGERY Bilateral     LITHOTRIPSY N/A     NEUROPLASTY / TRANSPOSITION MEDIAN NERVE AT CARPAL TUNNEL BILATERAL Bilateral     ROTATOR CUFF REPAIR Left     TONSILLECTOMY AND ADENOIDECTOMY Bilateral     TONSILLECTOMY AND ADENOIDECTOMY      TUBAL LIGATION N/A     US GUIDED THYROID BIOPSY  11/2018    WRIST FRACTURE SURGERY Right     plates and pins        Current Outpatient Medications   Medication Sig Dispense Refill    aspirin (ECOTRIN LOW STRENGTH) 81 mg EC tablet Take 1 tablet (81 mg total) by mouth daily 90 tablet 1    atorvastatin (LIPITOR) 40 mg tablet Take 1 tablet (40 mg total) by mouth daily 90 tablet 1  Coenzyme Q10 (COQ-10) 200 MG CAPS Take 1 capsule (200 mg total) by mouth daily (Patient not taking: Reported on 1/27/2020) 90 capsule 1    diclofenac sodium (VOLTAREN) 50 mg EC tablet Take 1 tablet (50 mg total) by mouth 2 (two) times a day 180 tablet 1    ergocalciferol (VITAMIN D2) 50,000 units Take 1 capsule (50,000 Units total) by mouth once a week 12 capsule 1    famotidine (PEPCID) 20 mg tablet Take by mouth      fluticasone (FLONASE) 50 mcg/act nasal spray 2 sprays into each nostril daily as needed for rhinitis or allergies 16 g 5    Glucosamine-Chondroitin 500-400 MG CAPS Take by mouth      ketoconazole (NIZORAL) 2 % cream Apply topically 2 (two) times a day as needed for itching, irritation or rash 60 g 5    KP Vitamin B-12 1000 MCG tablet Take 1 tablet (1,000 mcg total) by mouth every other day 90 tablet 1    levothyroxine 125 mcg tablet Take 1 tablet (125 mcg total) by mouth daily in the early morning 90 tablet 3    magnesium oxide (MAG-OX) 400 mg tablet Take 1 tablet by mouth daily at bedtime      magnesium oxide (MAG-OX) 400 mg Take 1 tablet (400 mg total) by mouth daily at bedtime 90 tablet 1    pantoprazole (PROTONIX) 40 mg tablet Take 1 tablet (40 mg total) by mouth daily 90 tablet 1    predniSONE 20 mg tablet       RA CALCIUM 600/VITAMIN D-3 600-400 MG-UNIT TABS Take 1 tablet by mouth 2 (two) times a day  0    tiZANidine (ZANAFLEX) 4 mg tablet 1 tab po every 8 hours x 7 days then prn spasms or pain 60 tablet 2    triamterene-hydrochlorothiazide (DYAZIDE) 37 5-25 mg per capsule Take 1 capsule by mouth every morning 30 capsule 5    Turmeric 500 MG CAPS Take 2 capsules by mouth daily       No current facility-administered medications for this visit  No Known Allergies    Review of Systems   All other systems reviewed and are negative  Video Exam    There were no vitals filed for this visit  Physical Exam  Constitutional:       Appearance: Normal appearance  Pulmonary:      Effort: Pulmonary effort is normal    Neurological:      General: No focal deficit present  Mental Status: She is alert and oriented to person, place, and time  Psychiatric:         Mood and Affect: Mood normal          Behavior: Behavior normal          Thought Content: Thought content normal          Judgment: Judgment normal           I spent 15 minutes directly with the patient during this visit      VIRTUAL VISIT Eze Pinto U  50  acknowledges that she has consented to an online visit or consultation  She understands that the online visit is based solely on information provided by her, and that, in the absence of a face-to-face physical evaluation by the physician, the diagnosis she receives is both limited and provisional in terms of accuracy and completeness  This is not intended to replace a full medical face-to-face evaluation by the physician  Nicole Sifuentes understands and accepts these terms

## 2020-08-12 NOTE — TELEPHONE ENCOUNTER
Patint needs to have the Diclofenac sen to [facility]  Will need script at [facility] discontinued

## 2020-08-13 ENCOUNTER — APPOINTMENT (OUTPATIENT)
Dept: RADIOLOGY | Facility: MEDICAL CENTER | Age: 65
End: 2020-08-13
Payer: COMMERCIAL

## 2020-08-13 DIAGNOSIS — M25.562 PAIN IN BOTH KNEES, UNSPECIFIED CHRONICITY: ICD-10-CM

## 2020-08-13 DIAGNOSIS — M25.561 PAIN IN BOTH KNEES, UNSPECIFIED CHRONICITY: ICD-10-CM

## 2020-08-13 PROCEDURE — 73564 X-RAY EXAM KNEE 4 OR MORE: CPT

## 2020-09-04 DIAGNOSIS — M79.661 PAIN IN BOTH LOWER LEGS: ICD-10-CM

## 2020-09-04 DIAGNOSIS — R06.02 SHORTNESS OF BREATH ON EXERTION: ICD-10-CM

## 2020-09-04 DIAGNOSIS — M79.89 LEG SWELLING: Primary | ICD-10-CM

## 2020-09-04 DIAGNOSIS — M79.662 PAIN IN BOTH LOWER LEGS: ICD-10-CM

## 2020-09-04 DIAGNOSIS — I10 ESSENTIAL HYPERTENSION: ICD-10-CM

## 2020-09-30 DIAGNOSIS — I10 ESSENTIAL HYPERTENSION: Primary | ICD-10-CM

## 2020-11-05 ENCOUNTER — TELEPHONE (OUTPATIENT)
Dept: FAMILY MEDICINE CLINIC | Facility: CLINIC | Age: 65
End: 2020-11-05

## 2020-11-13 ENCOUNTER — TELEPHONE (OUTPATIENT)
Dept: ADMINISTRATIVE | Facility: OTHER | Age: 65
End: 2020-11-13

## 2020-11-16 ENCOUNTER — OFFICE VISIT (OUTPATIENT)
Dept: FAMILY MEDICINE CLINIC | Facility: CLINIC | Age: 65
End: 2020-11-16
Payer: COMMERCIAL

## 2020-11-16 VITALS
OXYGEN SATURATION: 97 % | WEIGHT: 272.3 LBS | DIASTOLIC BLOOD PRESSURE: 74 MMHG | HEIGHT: 68 IN | TEMPERATURE: 98 F | HEART RATE: 76 BPM | SYSTOLIC BLOOD PRESSURE: 116 MMHG | BODY MASS INDEX: 41.27 KG/M2 | RESPIRATION RATE: 18 BRPM

## 2020-11-16 DIAGNOSIS — K21.00 GASTROESOPHAGEAL REFLUX DISEASE WITH ESOPHAGITIS WITHOUT HEMORRHAGE: ICD-10-CM

## 2020-11-16 DIAGNOSIS — I10 ESSENTIAL HYPERTENSION: Primary | ICD-10-CM

## 2020-11-16 DIAGNOSIS — E78.49 OTHER HYPERLIPIDEMIA: ICD-10-CM

## 2020-11-16 DIAGNOSIS — R73.9 HYPERGLYCEMIA: ICD-10-CM

## 2020-11-16 DIAGNOSIS — S76.911A MUSCLE STRAIN OF RIGHT THIGH, INITIAL ENCOUNTER: ICD-10-CM

## 2020-11-16 DIAGNOSIS — D64.9 ANEMIA, UNSPECIFIED TYPE: ICD-10-CM

## 2020-11-16 DIAGNOSIS — E55.9 VITAMIN D DEFICIENCY: ICD-10-CM

## 2020-11-16 DIAGNOSIS — E78.00 PURE HYPERCHOLESTEROLEMIA: ICD-10-CM

## 2020-11-16 DIAGNOSIS — Z00.00 HEALTHCARE MAINTENANCE: ICD-10-CM

## 2020-11-16 DIAGNOSIS — H53.8 BLURRED VISION, RIGHT EYE: ICD-10-CM

## 2020-11-16 DIAGNOSIS — R22.43 LOCALIZED SWELLING OF BOTH LOWER LEGS: ICD-10-CM

## 2020-11-16 DIAGNOSIS — E03.9 ACQUIRED HYPOTHYROIDISM: ICD-10-CM

## 2020-11-16 DIAGNOSIS — E53.8 VITAMIN B12 DEFICIENCY: ICD-10-CM

## 2020-11-16 DIAGNOSIS — J30.89 SEASONAL ALLERGIC RHINITIS DUE TO OTHER ALLERGIC TRIGGER: ICD-10-CM

## 2020-11-16 DIAGNOSIS — B37.0 CANDIDIASIS OF MOUTH: ICD-10-CM

## 2020-11-16 DIAGNOSIS — E83.42 HYPOMAGNESEMIA: ICD-10-CM

## 2020-11-16 PROCEDURE — 99214 OFFICE O/P EST MOD 30 MIN: CPT | Performed by: NURSE PRACTITIONER

## 2020-11-16 PROCEDURE — 1036F TOBACCO NON-USER: CPT | Performed by: NURSE PRACTITIONER

## 2020-11-16 PROCEDURE — 1160F RVW MEDS BY RX/DR IN RCRD: CPT | Performed by: NURSE PRACTITIONER

## 2020-11-16 PROCEDURE — 3725F SCREEN DEPRESSION PERFORMED: CPT | Performed by: NURSE PRACTITIONER

## 2020-11-16 PROCEDURE — 3078F DIAST BP <80 MM HG: CPT | Performed by: NURSE PRACTITIONER

## 2020-11-16 PROCEDURE — 3008F BODY MASS INDEX DOCD: CPT | Performed by: NURSE PRACTITIONER

## 2020-11-16 PROCEDURE — 3074F SYST BP LT 130 MM HG: CPT | Performed by: NURSE PRACTITIONER

## 2020-11-16 PROCEDURE — 3288F FALL RISK ASSESSMENT DOCD: CPT | Performed by: NURSE PRACTITIONER

## 2020-11-16 PROCEDURE — 1101F PT FALLS ASSESS-DOCD LE1/YR: CPT | Performed by: NURSE PRACTITIONER

## 2020-11-16 PROCEDURE — 4040F PNEUMOC VAC/ADMIN/RCVD: CPT | Performed by: NURSE PRACTITIONER

## 2020-11-16 RX ORDER — TIZANIDINE 4 MG/1
TABLET ORAL
Qty: 60 TABLET | Refills: 2 | Status: SHIPPED | OUTPATIENT
Start: 2020-11-16 | End: 2021-03-17 | Stop reason: ALTCHOICE

## 2020-11-16 RX ORDER — TRIAMTERENE AND HYDROCHLOROTHIAZIDE 37.5; 25 MG/1; MG/1
1 CAPSULE ORAL EVERY MORNING
Qty: 30 CAPSULE | Refills: 5 | Status: SHIPPED | OUTPATIENT
Start: 2020-11-16 | End: 2021-03-17 | Stop reason: SDUPTHER

## 2020-11-16 RX ORDER — ZINC 25 MG
1 TABLET ORAL DAILY
COMMUNITY

## 2020-11-16 RX ORDER — LORATADINE 10 MG/1
TABLET ORAL
Qty: 30 TABLET | Refills: 3 | Status: SHIPPED | OUTPATIENT
Start: 2020-11-16 | End: 2021-03-17 | Stop reason: SDUPTHER

## 2021-02-02 DIAGNOSIS — J11.1 INFLUENZA: Primary | ICD-10-CM

## 2021-02-03 RX ORDER — OSELTAMIVIR PHOSPHATE 75 MG/1
75 CAPSULE ORAL EVERY 12 HOURS SCHEDULED
Qty: 10 CAPSULE | Refills: 0 | Status: SHIPPED | OUTPATIENT
Start: 2021-02-03 | End: 2021-02-08

## 2021-02-03 NOTE — TELEPHONE ENCOUNTER
Returned patient's call earlier today due to received tiger text from on call staff  Patient requesting Rx for Tamiflu for Flu like symptoms including body aches, sinus congestion with runny nose, fatigue, and low grade fever of 99 8  Patient states she feels she has the same symptoms as when she had the Flu last year at this time and feels Tamiflu relieve her symptoms then  Discussed with patient importance of going for COVID testing but patient refuses  Rx Tamiflu 75mg si tab po 2 x daily x 5 days #10 0ref escribed to [facility]  Instructed patient to contact office if symptoms worsen or do not improve to schedule appointment

## 2021-02-11 DIAGNOSIS — M25.552 LEFT HIP PAIN: ICD-10-CM

## 2021-03-03 ENCOUNTER — LAB (OUTPATIENT)
Dept: LAB | Facility: MEDICAL CENTER | Age: 66
End: 2021-03-03
Payer: COMMERCIAL

## 2021-03-03 DIAGNOSIS — M79.661 PAIN IN BOTH LOWER LEGS: ICD-10-CM

## 2021-03-03 DIAGNOSIS — E83.42 HYPOMAGNESEMIA: ICD-10-CM

## 2021-03-03 DIAGNOSIS — M79.89 LEG SWELLING: ICD-10-CM

## 2021-03-03 DIAGNOSIS — E53.8 VITAMIN B12 DEFICIENCY: ICD-10-CM

## 2021-03-03 DIAGNOSIS — M79.662 PAIN IN BOTH LOWER LEGS: ICD-10-CM

## 2021-03-03 DIAGNOSIS — E55.9 VITAMIN D DEFICIENCY: ICD-10-CM

## 2021-03-03 DIAGNOSIS — R06.02 SHORTNESS OF BREATH ON EXERTION: ICD-10-CM

## 2021-03-03 DIAGNOSIS — E78.00 PURE HYPERCHOLESTEROLEMIA: ICD-10-CM

## 2021-03-03 DIAGNOSIS — D64.9 ANEMIA, UNSPECIFIED TYPE: ICD-10-CM

## 2021-03-03 DIAGNOSIS — R73.9 HYPERGLYCEMIA: ICD-10-CM

## 2021-03-03 DIAGNOSIS — E03.9 ACQUIRED HYPOTHYROIDISM: ICD-10-CM

## 2021-03-03 DIAGNOSIS — I10 ESSENTIAL HYPERTENSION: ICD-10-CM

## 2021-03-03 LAB
25(OH)D3 SERPL-MCNC: 59.5 NG/ML (ref 30–100)
ALBUMIN SERPL BCP-MCNC: 4.2 G/DL (ref 3.5–5)
ALP SERPL-CCNC: 67 U/L (ref 46–116)
ALT SERPL W P-5'-P-CCNC: 44 U/L (ref 12–78)
ANION GAP SERPL CALCULATED.3IONS-SCNC: 7 MMOL/L (ref 4–13)
AST SERPL W P-5'-P-CCNC: 28 U/L (ref 5–45)
BASOPHILS # BLD AUTO: 0.08 THOUSANDS/ΜL (ref 0–0.1)
BASOPHILS NFR BLD AUTO: 1 % (ref 0–1)
BILIRUB SERPL-MCNC: 0.68 MG/DL (ref 0.2–1)
BUN SERPL-MCNC: 19 MG/DL (ref 5–25)
CALCIUM SERPL-MCNC: 9.5 MG/DL (ref 8.3–10.1)
CHLORIDE SERPL-SCNC: 101 MMOL/L (ref 100–108)
CHOLEST SERPL-MCNC: 210 MG/DL (ref 50–200)
CO2 SERPL-SCNC: 27 MMOL/L (ref 21–32)
CREAT SERPL-MCNC: 1.06 MG/DL (ref 0.6–1.3)
EOSINOPHIL # BLD AUTO: 0.28 THOUSAND/ΜL (ref 0–0.61)
EOSINOPHIL NFR BLD AUTO: 3 % (ref 0–6)
ERYTHROCYTE [DISTWIDTH] IN BLOOD BY AUTOMATED COUNT: 14.4 % (ref 11.6–15.1)
EST. AVERAGE GLUCOSE BLD GHB EST-MCNC: 111 MG/DL
GFR SERPL CREATININE-BSD FRML MDRD: 54 ML/MIN/1.73SQ M
GLUCOSE P FAST SERPL-MCNC: 89 MG/DL (ref 65–99)
HBA1C MFR BLD: 5.5 %
HCT VFR BLD AUTO: 44.1 % (ref 34.8–46.1)
HDLC SERPL-MCNC: 47 MG/DL
HGB BLD-MCNC: 14.4 G/DL (ref 11.5–15.4)
IMM GRANULOCYTES # BLD AUTO: 0.05 THOUSAND/UL (ref 0–0.2)
IMM GRANULOCYTES NFR BLD AUTO: 1 % (ref 0–2)
INSULIN SERPL-ACNC: 12.3 MU/L (ref 3–25)
LDLC SERPL CALC-MCNC: 129 MG/DL (ref 0–100)
LYMPHOCYTES # BLD AUTO: 2.86 THOUSANDS/ΜL (ref 0.6–4.47)
LYMPHOCYTES NFR BLD AUTO: 34 % (ref 14–44)
MAGNESIUM SERPL-MCNC: 2.7 MG/DL (ref 1.6–2.6)
MCH RBC QN AUTO: 31.2 PG (ref 26.8–34.3)
MCHC RBC AUTO-ENTMCNC: 32.7 G/DL (ref 31.4–37.4)
MCV RBC AUTO: 96 FL (ref 82–98)
MONOCYTES # BLD AUTO: 0.63 THOUSAND/ΜL (ref 0.17–1.22)
MONOCYTES NFR BLD AUTO: 8 % (ref 4–12)
NEUTROPHILS # BLD AUTO: 4.48 THOUSANDS/ΜL (ref 1.85–7.62)
NEUTS SEG NFR BLD AUTO: 53 % (ref 43–75)
NONHDLC SERPL-MCNC: 163 MG/DL
NRBC BLD AUTO-RTO: 0 /100 WBCS
NT-PROBNP SERPL-MCNC: 21 PG/ML
PLATELET # BLD AUTO: 229 THOUSANDS/UL (ref 149–390)
PMV BLD AUTO: 10.6 FL (ref 8.9–12.7)
POTASSIUM SERPL-SCNC: 4.1 MMOL/L (ref 3.5–5.3)
PROT SERPL-MCNC: 7.9 G/DL (ref 6.4–8.2)
RBC # BLD AUTO: 4.62 MILLION/UL (ref 3.81–5.12)
SODIUM SERPL-SCNC: 135 MMOL/L (ref 136–145)
TRIGL SERPL-MCNC: 171 MG/DL
TSH SERPL DL<=0.05 MIU/L-ACNC: 2.78 UIU/ML (ref 0.36–3.74)
VIT B12 SERPL-MCNC: 614 PG/ML (ref 100–900)
WBC # BLD AUTO: 8.38 THOUSAND/UL (ref 4.31–10.16)

## 2021-03-03 PROCEDURE — 82607 VITAMIN B-12: CPT

## 2021-03-03 PROCEDURE — 36415 COLL VENOUS BLD VENIPUNCTURE: CPT

## 2021-03-03 PROCEDURE — 83525 ASSAY OF INSULIN: CPT

## 2021-03-03 PROCEDURE — 82306 VITAMIN D 25 HYDROXY: CPT

## 2021-03-03 PROCEDURE — 85025 COMPLETE CBC W/AUTO DIFF WBC: CPT

## 2021-03-03 PROCEDURE — 83880 ASSAY OF NATRIURETIC PEPTIDE: CPT

## 2021-03-03 PROCEDURE — 83036 HEMOGLOBIN GLYCOSYLATED A1C: CPT

## 2021-03-03 PROCEDURE — 80053 COMPREHEN METABOLIC PANEL: CPT

## 2021-03-03 PROCEDURE — 84443 ASSAY THYROID STIM HORMONE: CPT

## 2021-03-03 PROCEDURE — 83735 ASSAY OF MAGNESIUM: CPT

## 2021-03-03 PROCEDURE — 80061 LIPID PANEL: CPT

## 2021-03-16 PROCEDURE — 3288F FALL RISK ASSESSMENT DOCD: CPT | Performed by: NURSE PRACTITIONER

## 2021-03-16 NOTE — PROGRESS NOTES
Assessment and Plan:     Problem List Items Addressed This Visit        Digestive    Gastroesophageal reflux disease with esophagitis       Endocrine    Acquired hypothyroidism       Cardiovascular and Mediastinum    Essential hypertension       Other    Pure hypercholesterolemia    Vitamin D deficiency    Vitamin B12 deficiency    Anxiety state      Other Visit Diagnoses     Encounter for annual wellness exam in Medicare patient    -  Primary           Preventive health issues were discussed with patient, and age appropriate screening tests were ordered as noted in patient's After Visit Summary  Personalized health advice and appropriate referrals for health education or preventive services given if needed, as noted in patient's After Visit Summary  History of Present Illness:     Patient presents for Welcome to Medicare visit       Patient Care Team:  Chelly Cooper as PCP - General (Family Medicine)     Review of Systems:     Review of Systems   Problem List:     Patient Active Problem List   Diagnosis    Acquired hypothyroidism    Essential hypertension    Pure hypercholesterolemia    Vitamin D deficiency    Vitamin B12 deficiency    Pneumonia of left lower lobe due to infectious organism    Gastroesophageal reflux disease with esophagitis    Diverticulitis    Anxiety state      Past Medical and Surgical History:     Past Medical History:   Diagnosis Date    Allergic rhinitis     Carpal tunnel syndrome on both sides     Cervical disc herniation     DVT (deep venous thrombosis) (HCC)     DVT Left Leg    Fracture of foot     left casted    History of mammogram 2017    Being followed by Dr Nelson Standing History of Papanicolaou smear of cervix 2016    Dr Nelson Standing Hx of colonoscopy 2016    Dr Teena Fuentes    Hyperlipemia     Hypertension     Hypothyroidism     Lichen sclerosus     Renal calculi     Rotator cuff tear, left      Past Surgical History:   Procedure Laterality Date    CERVICAL LAMINECTOMY N/A     CHOLECYSTECTOMY N/A     KNEE CARTILAGE SURGERY Bilateral     LITHOTRIPSY N/A     NEUROPLASTY / TRANSPOSITION MEDIAN NERVE AT CARPAL TUNNEL BILATERAL Bilateral     ROTATOR CUFF REPAIR Left     TONSILLECTOMY AND ADENOIDECTOMY Bilateral     TONSILLECTOMY AND ADENOIDECTOMY      TUBAL LIGATION N/A     US GUIDED THYROID BIOPSY  2018    WRIST FRACTURE SURGERY Right     plates and pins       Family History:     Family History   Problem Relation Age of Onset    Hypertension Mother     Hyperlipidemia Mother     Hypothyroidism Mother    Beau Kemper Brain cancer Mother     Hypertension Father     Celiac disease Father     Ovarian cancer Sister     Celiac disease Brother     Hypothyroidism Brother     Diabetes Paternal [de-identified]     Hypothyroidism Sister     Coronary artery disease Brother     Hypertension Brother     Hyperlipidemia Brother     Diabetes Sister       Social History:     E-Cigarette/Vaping    E-Cigarette Use Never User      E-Cigarette/Vaping Substances    Nicotine No     THC No     CBD No     Flavoring No     Other No     Unknown No      Social History     Socioeconomic History    Marital status: /Civil Union     Spouse name: None    Number of children: None    Years of education: None    Highest education level: None   Occupational History    None   Social Needs    Financial resource strain: None    Food insecurity     Worry: None     Inability: None    Transportation needs     Medical: None     Non-medical: None   Tobacco Use    Smoking status: Former Smoker     Packs/day: 0 30     Years: 10 00     Pack years: 3 00     Types: Cigarettes     Quit date: 2019     Years since quittin 0    Smokeless tobacco: Never Used    Tobacco comment: quite date 1 year   Substance and Sexual Activity    Alcohol use: No    Drug use: No    Sexual activity: Not Currently   Lifestyle    Physical activity     Days per week: None     Minutes per session: None  Stress: None   Relationships    Social connections     Talks on phone: None     Gets together: None     Attends Christianity service: None     Active member of club or organization: None     Attends meetings of clubs or organizations: None     Relationship status: None    Intimate partner violence     Fear of current or ex partner: None     Emotionally abused: None     Physically abused: None     Forced sexual activity: None   Other Topics Concern    None   Social History Narrative    None      Medications and Allergies:     Current Outpatient Medications   Medication Sig Dispense Refill    aspirin (ECOTRIN LOW STRENGTH) 81 mg EC tablet Take 1 tablet (81 mg total) by mouth daily 90 tablet 1    atorvastatin (LIPITOR) 40 mg tablet Take 1 tablet (40 mg total) by mouth daily 90 tablet 1    diclofenac sodium (VOLTAREN) 50 mg EC tablet take 1 tablet by mouth twice a day 180 tablet 1    ergocalciferol (VITAMIN D2) 50,000 units Take 1 capsule (50,000 Units total) by mouth once a week 12 capsule 1    famotidine (PEPCID) 20 mg tablet Take by mouth      fluticasone (FLONASE) 50 mcg/act nasal spray 2 sprays into each nostril daily as needed for rhinitis or allergies 16 g 5    Glucosamine-Chondroitin 500-400 MG CAPS Take by mouth      ketoconazole (NIZORAL) 2 % cream Apply topically 2 (two) times a day as needed for itching, irritation or rash 60 g 5    KP Vitamin B-12 1000 MCG tablet Take 1 tablet (1,000 mcg total) by mouth every other day 90 tablet 1    levothyroxine 125 mcg tablet Take 1 tablet (125 mcg total) by mouth daily in the early morning 90 tablet 3    loratadine (CLARITIN) 10 mg tablet 1 tab po daily x 14 days then prn allergies 30 tablet 3    magnesium oxide (MAG-OX) 400 mg Take 1 tablet (400 mg total) by mouth daily at bedtime 90 tablet 1    nystatin (MYCOSTATIN) 500,000 units/5 mL suspension 1 teaspoon swish and swallow every 6 hours x 10 days 250 mL 2    pantoprazole (PROTONIX) 40 mg tablet Take 1 tablet (40 mg total) by mouth daily 90 tablet 1    RA CALCIUM 600/VITAMIN D-3 600-400 MG-UNIT TABS Take 1 tablet by mouth 2 (two) times a day  0    tiZANidine (ZANAFLEX) 4 mg tablet 1 tab po every 8 hours x 7 days then prn spasms or pain 60 tablet 2    triamterene-hydrochlorothiazide (DYAZIDE) 37 5-25 mg per capsule Take 1 capsule by mouth every morning 30 capsule 5    Turmeric 500 MG CAPS Take 2 capsules by mouth daily      Zinc 25 MG TABS Take 1 tablet by mouth daily      Ergocalciferol (VITAMIN D2 PO) take 1 capsule by mouth every week       No current facility-administered medications for this visit  No Known Allergies   Immunizations:     Immunization History   Administered Date(s) Administered    INFLUENZA 08/25/2017, 09/19/2018, 10/27/2020    Influenza, injectable, quadrivalent, preservative free 0 5 mL 09/19/2018    Pneumococcal Conjugate 13-Valent 10/27/2020    Tdap 11/20/2017    Zoster 01/08/2017    Zoster Vaccine Recombinant 10/03/2018      Health Maintenance:         Topic Date Due    Hepatitis C Screening  1955    Cervical Cancer Screening  01/21/2021    Colorectal Cancer Screening  02/01/2026         Topic Date Due    COVID-19 Vaccine (1 of 2) 02/06/1971      Medicare Screening Tests and Risk Assessments: [facility] is here for her Subsequent Wellness visit  Last Medicare Wellness visit information reviewed, patient interviewed, no change since last AWV  Health Risk Assessment:   Patient rates overall health as good  Patient feels that their physical health rating is slightly worse  Eyesight was rated as same  Hearing was rated as same  Patient feels that their emotional and mental health rating is same  Pain experienced in the last 7 days has been some  Patient's pain rating has been 5/10  Depression Screening:   PHQ-2 Score: 2      Fall Risk Screening:    In the past year, patient has experienced: no history of falling in past year      Urinary Incontinence Screening:   Patient has not leaked urine accidently in the last six months  Home Safety:  Patient has trouble with stairs inside or outside of their home  Patient has working smoke alarms and has working carbon monoxide detector  Nutrition:   Current diet is Regular  Medications:   Patient is currently taking over-the-counter supplements  OTC medications include: see medication list  Patient is able to manage medications  Activities of Daily Living (ADLs)/Instrumental Activities of Daily Living (IADLs):   Walk and transfer into and out of bed and chair?: Yes  Dress and groom yourself?: Yes    Bathe or shower yourself?: Yes    Feed yourself?  Yes  Do your laundry/housekeeping?: Yes  Manage your money, pay your bills and track your expenses?: Yes  Make your own meals?: Yes    Do your own shopping?: Yes    Previous Hospitalizations:   Any hospitalizations or ED visits within the last 12 months?: No      Advance Care Planning:   Living will: No    Advanced directive: No      PREVENTIVE SCREENINGS      Cardiovascular Screening:    General: Screening Not Indicated and History Lipid Disorder      Diabetes Screening:     General: Screening Current      Colorectal Cancer Screening:     General: Screening Current      Cervical Cancer Screening:    General: Screening Not Indicated      Lung Cancer Screening:     General: Screening Not Indicated    No exam data present     Physical Exam:     /78   Pulse 89   Temp 98 1 °F (36 7 °C)   Resp 16   Ht 5' 8" (1 727 m)   Wt 124 kg (274 lb)   LMP 01/01/2004   SpO2 97%   BMI 41 66 kg/m²     Physical Exam     KELLIE Diaz

## 2021-03-17 ENCOUNTER — TELEPHONE (OUTPATIENT)
Dept: FAMILY MEDICINE CLINIC | Facility: CLINIC | Age: 66
End: 2021-03-17

## 2021-03-17 ENCOUNTER — OFFICE VISIT (OUTPATIENT)
Dept: FAMILY MEDICINE CLINIC | Facility: CLINIC | Age: 66
End: 2021-03-17
Payer: COMMERCIAL

## 2021-03-17 VITALS
HEIGHT: 68 IN | DIASTOLIC BLOOD PRESSURE: 78 MMHG | OXYGEN SATURATION: 97 % | HEART RATE: 89 BPM | RESPIRATION RATE: 16 BRPM | TEMPERATURE: 98.1 F | WEIGHT: 274 LBS | SYSTOLIC BLOOD PRESSURE: 124 MMHG | BODY MASS INDEX: 41.52 KG/M2

## 2021-03-17 DIAGNOSIS — E78.49 OTHER HYPERLIPIDEMIA: ICD-10-CM

## 2021-03-17 DIAGNOSIS — F41.1 ANXIETY STATE: ICD-10-CM

## 2021-03-17 DIAGNOSIS — I10 ESSENTIAL HYPERTENSION: ICD-10-CM

## 2021-03-17 DIAGNOSIS — D64.9 ANEMIA, UNSPECIFIED TYPE: ICD-10-CM

## 2021-03-17 DIAGNOSIS — Z00.00 ENCOUNTER FOR ANNUAL WELLNESS EXAM IN MEDICARE PATIENT: Primary | ICD-10-CM

## 2021-03-17 DIAGNOSIS — R73.9 HYPERGLYCEMIA: ICD-10-CM

## 2021-03-17 DIAGNOSIS — R07.89 CHEST PRESSURE: ICD-10-CM

## 2021-03-17 DIAGNOSIS — R22.43 LOCALIZED SWELLING OF BOTH LOWER LEGS: ICD-10-CM

## 2021-03-17 DIAGNOSIS — E55.9 VITAMIN D DEFICIENCY: ICD-10-CM

## 2021-03-17 DIAGNOSIS — Z72.0 TOBACCO USER: ICD-10-CM

## 2021-03-17 DIAGNOSIS — J30.89 SEASONAL ALLERGIC RHINITIS DUE TO OTHER ALLERGIC TRIGGER: ICD-10-CM

## 2021-03-17 DIAGNOSIS — M25.512 ACUTE PAIN OF LEFT SHOULDER: ICD-10-CM

## 2021-03-17 DIAGNOSIS — Z12.31 BREAST CANCER SCREENING BY MAMMOGRAM: ICD-10-CM

## 2021-03-17 DIAGNOSIS — E78.00 PURE HYPERCHOLESTEROLEMIA: ICD-10-CM

## 2021-03-17 DIAGNOSIS — Z11.59 NEED FOR HEPATITIS C SCREENING TEST: ICD-10-CM

## 2021-03-17 DIAGNOSIS — Z78.0 POSTMENOPAUSAL: ICD-10-CM

## 2021-03-17 DIAGNOSIS — Z11.4 ENCOUNTER FOR SCREENING FOR HIV: ICD-10-CM

## 2021-03-17 DIAGNOSIS — K21.9 GASTROESOPHAGEAL REFLUX DISEASE: ICD-10-CM

## 2021-03-17 DIAGNOSIS — E53.8 VITAMIN B12 DEFICIENCY: ICD-10-CM

## 2021-03-17 DIAGNOSIS — Z12.2 ENCOUNTER FOR SCREENING FOR LUNG CANCER: ICD-10-CM

## 2021-03-17 DIAGNOSIS — E07.9 THYROID DISORDER: ICD-10-CM

## 2021-03-17 DIAGNOSIS — K21.00 GASTROESOPHAGEAL REFLUX DISEASE WITH ESOPHAGITIS WITHOUT HEMORRHAGE: ICD-10-CM

## 2021-03-17 DIAGNOSIS — M85.80 OSTEOPENIA, UNSPECIFIED LOCATION: ICD-10-CM

## 2021-03-17 DIAGNOSIS — E03.9 ACQUIRED HYPOTHYROIDISM: ICD-10-CM

## 2021-03-17 DIAGNOSIS — M25.552 LEFT HIP PAIN: ICD-10-CM

## 2021-03-17 DIAGNOSIS — R00.2 PALPITATIONS: ICD-10-CM

## 2021-03-17 PROCEDURE — 3288F FALL RISK ASSESSMENT DOCD: CPT | Performed by: NURSE PRACTITIONER

## 2021-03-17 PROCEDURE — 1125F AMNT PAIN NOTED PAIN PRSNT: CPT | Performed by: NURSE PRACTITIONER

## 2021-03-17 PROCEDURE — 3078F DIAST BP <80 MM HG: CPT | Performed by: NURSE PRACTITIONER

## 2021-03-17 PROCEDURE — 3008F BODY MASS INDEX DOCD: CPT | Performed by: NURSE PRACTITIONER

## 2021-03-17 PROCEDURE — 1123F ACP DISCUSS/DSCN MKR DOCD: CPT | Performed by: NURSE PRACTITIONER

## 2021-03-17 PROCEDURE — 3074F SYST BP LT 130 MM HG: CPT | Performed by: NURSE PRACTITIONER

## 2021-03-17 PROCEDURE — 1036F TOBACCO NON-USER: CPT | Performed by: NURSE PRACTITIONER

## 2021-03-17 PROCEDURE — 99213 OFFICE O/P EST LOW 20 MIN: CPT | Performed by: NURSE PRACTITIONER

## 2021-03-17 PROCEDURE — 1160F RVW MEDS BY RX/DR IN RCRD: CPT | Performed by: NURSE PRACTITIONER

## 2021-03-17 PROCEDURE — 3725F SCREEN DEPRESSION PERFORMED: CPT | Performed by: NURSE PRACTITIONER

## 2021-03-17 PROCEDURE — G0438 PPPS, INITIAL VISIT: HCPCS | Performed by: NURSE PRACTITIONER

## 2021-03-17 PROCEDURE — 1170F FXNL STATUS ASSESSED: CPT | Performed by: NURSE PRACTITIONER

## 2021-03-17 RX ORDER — LORATADINE 10 MG/1
TABLET ORAL
Qty: 30 TABLET | Refills: 3 | Status: SHIPPED | OUTPATIENT
Start: 2021-03-17 | End: 2021-10-18 | Stop reason: SDUPTHER

## 2021-03-17 RX ORDER — ATORVASTATIN CALCIUM 40 MG/1
40 TABLET, FILM COATED ORAL DAILY
Qty: 90 TABLET | Refills: 1 | Status: SHIPPED | OUTPATIENT
Start: 2021-03-17 | End: 2021-10-18 | Stop reason: SDUPTHER

## 2021-03-17 RX ORDER — ATORVASTATIN CALCIUM 40 MG/1
40 TABLET, FILM COATED ORAL DAILY
Qty: 90 TABLET | Refills: 1 | Status: SHIPPED | OUTPATIENT
Start: 2021-03-17 | End: 2021-03-17 | Stop reason: SDUPTHER

## 2021-03-17 RX ORDER — ERGOCALCIFEROL 1.25 MG/1
50000 CAPSULE ORAL WEEKLY
Qty: 12 CAPSULE | Refills: 1 | Status: SHIPPED | OUTPATIENT
Start: 2021-03-17 | End: 2021-07-19 | Stop reason: SDUPTHER

## 2021-03-17 RX ORDER — TRIAMTERENE AND HYDROCHLOROTHIAZIDE 37.5; 25 MG/1; MG/1
1 CAPSULE ORAL EVERY MORNING
Qty: 30 CAPSULE | Refills: 5 | Status: SHIPPED | OUTPATIENT
Start: 2021-03-17 | End: 2021-10-18 | Stop reason: SDUPTHER

## 2021-03-17 RX ORDER — LEVOTHYROXINE SODIUM 0.12 MG/1
125 TABLET ORAL
Qty: 90 TABLET | Refills: 3 | Status: SHIPPED | OUTPATIENT
Start: 2021-03-17 | End: 2022-04-04 | Stop reason: SDUPTHER

## 2021-03-17 RX ORDER — LEVOTHYROXINE SODIUM 0.12 MG/1
125 TABLET ORAL
Qty: 90 TABLET | Refills: 3 | Status: SHIPPED | OUTPATIENT
Start: 2021-03-17 | End: 2021-03-17 | Stop reason: SDUPTHER

## 2021-03-17 RX ORDER — PANTOPRAZOLE SODIUM 40 MG/1
40 TABLET, DELAYED RELEASE ORAL DAILY
Qty: 90 TABLET | Refills: 1 | Status: SHIPPED | OUTPATIENT
Start: 2021-03-17

## 2021-03-17 RX ORDER — CYANOCOBALAMIN (VITAMIN B-12) 1000 MCG
1000 TABLET ORAL EVERY OTHER DAY
Qty: 90 TABLET | Refills: 1 | Status: SHIPPED | OUTPATIENT
Start: 2021-03-17 | End: 2022-06-23

## 2021-03-17 NOTE — PROGRESS NOTES
Assessment and Plan:     Problem List Items Addressed This Visit        Digestive    Gastroesophageal reflux disease with esophagitis       Endocrine    Acquired hypothyroidism       Cardiovascular and Mediastinum    Essential hypertension       Other    Pure hypercholesterolemia    Vitamin D deficiency    Relevant Orders    Vitamin D 25 hydroxy    Vitamin B12 deficiency    Relevant Orders    Vitamin B12    Anxiety state    Body mass index (BMI)40 0-44 9, adult ([facility] [address] )      Other Visit Diagnoses     Encounter for annual wellness exam in Medicare patient    -  Primary    Breast cancer screening by mammogram        Relevant Orders    Mammo screening bilateral w 3d & cad    Osteopenia, unspecified location        Relevant Orders    DXA bone density spine hip and pelvis    Postmenopausal        Relevant Orders    DXA bone density spine hip and pelvis    Tobacco user        Relevant Orders    CT lung screening program    Encounter for screening for lung cancer        Relevant Orders    CT lung screening program    Anemia, unspecified type        Relevant Orders    CBC and differential    Hyperglycemia        Relevant Orders    Comprehensive metabolic panel    Hemoglobin A1C    Insulin, fasting    Other hyperlipidemia        Relevant Orders    Lipid panel    Thyroid disorder        Relevant Orders    TSH, 3rd generation    Encounter for screening for HIV        Relevant Orders    HIV 1/2 Antigen/Antibody (4th Generation) w Reflex [facility]    Need for hepatitis C screening test        Relevant Orders    Hepatitis C antibody           Preventive health issues were discussed with patient, and age appropriate screening tests were ordered as noted in patient's After Visit Summary  Personalized health advice and appropriate referrals for health education or preventive services given if needed, as noted in patient's After Visit Summary       History of Present Illness:     Patient presents for Annual Medicare Wellness Exam  Patient Care Team:  KELLIE Rios as PCP - General (Family Medicine)     Review of Systems:     Review of Systems   Problem List:     Patient Active Problem List   Diagnosis    Acquired hypothyroidism    Essential hypertension    Pure hypercholesterolemia    Vitamin D deficiency    Vitamin B12 deficiency    Pneumonia of left lower lobe due to infectious organism    Gastroesophageal reflux disease with esophagitis    Diverticulitis    Anxiety state    Body mass index (BMI)40 0-44 9, adult [facility])      Past Medical and Surgical History:     Past Medical History:   Diagnosis Date    Allergic rhinitis     Carpal tunnel syndrome on both sides     Cervical disc herniation     DVT (deep venous thrombosis) ([address] )     DVT Left Leg    Fracture of foot     left casted    History of mammogram 2017    Being followed by Dr Sky Landon History of Papanicolaou smear of cervix 2016    Dr Sky Landon Hx of colonoscopy 2016    Dr Roxanne Ha    Hyperlipemia     Hypertension     Hypothyroidism     Lichen sclerosus     Renal calculi     Rotator cuff tear, left      Past Surgical History:   Procedure Laterality Date    CERVICAL LAMINECTOMY N/A     CHOLECYSTECTOMY N/A     KNEE CARTILAGE SURGERY Bilateral     LITHOTRIPSY N/A     NEUROPLASTY / TRANSPOSITION MEDIAN NERVE AT CARPAL TUNNEL BILATERAL Bilateral     ROTATOR CUFF REPAIR Left     TONSILLECTOMY AND ADENOIDECTOMY Bilateral     TONSILLECTOMY AND ADENOIDECTOMY      TUBAL LIGATION N/A     US GUIDED THYROID BIOPSY  11/2018    WRIST FRACTURE SURGERY Right     plates and pins       Family History:     Family History   Problem Relation Age of Onset    Hypertension Mother     Hyperlipidemia Mother     Hypothyroidism Mother    Milagros Mullet Brain cancer Mother     Hypertension Father     Celiac disease Father     Ovarian cancer Sister     Celiac disease Brother     Hypothyroidism Brother     Diabetes Paternal Grandmother     Hypothyroidism Sister     Coronary artery disease Brother     Hypertension Brother     Hyperlipidemia Brother     Diabetes Sister       Social History:     E-Cigarette/Vaping    E-Cigarette Use Never User      E-Cigarette/Vaping Substances    Nicotine No     THC No     CBD No     Flavoring No     Other No     Unknown No      Social History     Socioeconomic History    Marital status: /Civil Union     Spouse name: None    Number of children: None    Years of education: None    Highest education level: None   Occupational History    None   Social Needs    Financial resource strain: None    Food insecurity     Worry: None     Inability: None    Transportation needs     Medical: None     Non-medical: None   Tobacco Use    Smoking status: Former Smoker     Packs/day: 0 30     Years: 10 00     Pack years: 3 00     Types: Cigarettes     Quit date: 2019     Years since quittin 0    Smokeless tobacco: Never Used    Tobacco comment: quite date 1 year   Substance and Sexual Activity    Alcohol use: No    Drug use: No    Sexual activity: Not Currently   Lifestyle    Physical activity     Days per week: None     Minutes per session: None    Stress: None   Relationships    Social connections     Talks on phone: None     Gets together: None     Attends Voodoo service: None     Active member of club or organization: None     Attends meetings of clubs or organizations: None     Relationship status: None    Intimate partner violence     Fear of current or ex partner: None     Emotionally abused: None     Physically abused: None     Forced sexual activity: None   Other Topics Concern    None   Social History Narrative    None      Medications and Allergies:     Current Outpatient Medications   Medication Sig Dispense Refill    aspirin (ECOTRIN LOW STRENGTH) 81 mg EC tablet Take 1 tablet (81 mg total) by mouth daily 90 tablet 1    atorvastatin (LIPITOR) 40 mg tablet Take 1 tablet (40 mg total) by mouth daily 90 tablet 1    diclofenac sodium (VOLTAREN) 50 mg EC tablet take 1 tablet by mouth twice a day 180 tablet 1    ergocalciferol (VITAMIN D2) 50,000 units Take 1 capsule (50,000 Units total) by mouth once a week 12 capsule 1    famotidine (PEPCID) 20 mg tablet Take by mouth      fluticasone (FLONASE) 50 mcg/act nasal spray 2 sprays into each nostril daily as needed for rhinitis or allergies 16 g 5    Glucosamine-Chondroitin 500-400 MG CAPS Take by mouth      ketoconazole (NIZORAL) 2 % cream Apply topically 2 (two) times a day as needed for itching, irritation or rash 60 g 5    KP Vitamin B-12 1000 MCG tablet Take 1 tablet (1,000 mcg total) by mouth every other day 90 tablet 1    levothyroxine 125 mcg tablet Take 1 tablet (125 mcg total) by mouth daily in the early morning 90 tablet 3    loratadine (CLARITIN) 10 mg tablet 1 tab po daily x 14 days then prn allergies 30 tablet 3    magnesium oxide (MAG-OX) 400 mg Take 1 tablet (400 mg total) by mouth daily at bedtime 90 tablet 1    nystatin (MYCOSTATIN) 500,000 units/5 mL suspension 1 teaspoon swish and swallow every 6 hours x 10 days 250 mL 2    pantoprazole (PROTONIX) 40 mg tablet Take 1 tablet (40 mg total) by mouth daily 90 tablet 1    RA CALCIUM 600/VITAMIN D-3 600-400 MG-UNIT TABS Take 1 tablet by mouth 2 (two) times a day  0    tiZANidine (ZANAFLEX) 4 mg tablet 1 tab po every 8 hours x 7 days then prn spasms or pain 60 tablet 2    triamterene-hydrochlorothiazide (DYAZIDE) 37 5-25 mg per capsule Take 1 capsule by mouth every morning 30 capsule 5    Turmeric 500 MG CAPS Take 2 capsules by mouth daily      Zinc 25 MG TABS Take 1 tablet by mouth daily      Ergocalciferol (VITAMIN D2 PO) take 1 capsule by mouth every week       No current facility-administered medications for this visit        No Known Allergies   Immunizations:     Immunization History   Administered Date(s) Administered    INFLUENZA 08/25/2017, 09/19/2018, 10/27/2020    Influenza, injectable, quadrivalent, preservative free 0 5 mL 09/19/2018    Pneumococcal Conjugate 13-Valent 10/27/2020    Tdap 11/20/2017    Zoster 01/08/2017    Zoster Vaccine Recombinant 10/03/2018      Health Maintenance:         Topic Date Due    Hepatitis C Screening  Never done    Cervical Cancer Screening  01/21/2021    Colorectal Cancer Screening  02/01/2026         Topic Date Due    COVID-19 Vaccine (1 of 2) Never done      Medicare Screening Tests and Risk Assessments: Debby Albright is here for her Subsequent Wellness visit  Last Medicare Wellness visit information reviewed, patient interviewed, no change since last AWV  Health Risk Assessment:   Patient rates overall health as good  Patient feels that their physical health rating is slightly worse  Patient is satisfied with their life  Eyesight was rated as same  Hearing was rated as same  Patient feels that their emotional and mental health rating is same  Patients states they are never, rarely angry  Patient states they are sometimes unusually tired/fatigued  Pain experienced in the last 7 days has been some  Patient's pain rating has been 5/10  Patient states that she has experienced weight loss or gain in last 6 months  Depression Screening:   PHQ-2 Score: 2      Fall Risk Screening: In the past year, patient has experienced: no history of falling in past year      Urinary Incontinence Screening:   Patient has not leaked urine accidently in the last six months  Home Safety:  Patient does not have trouble with stairs inside or outside of their home  Patient has working smoke alarms and has working carbon monoxide detector  Home safety hazards include: none  Nutrition:   Current diet is Regular  Medications:   Patient is currently taking over-the-counter supplements  OTC medications include: see medication list  Patient is able to manage medications       Activities of Daily Living (ADLs)/Instrumental Activities of Daily Living (IADLs): Walk and transfer into and out of bed and chair?: Yes  Dress and groom yourself?: Yes    Bathe or shower yourself?: Yes    Feed yourself?  Yes  Do your laundry/housekeeping?: Yes  Manage your money, pay your bills and track your expenses?: Yes  Make your own meals?: Yes    Do your own shopping?: Yes    Previous Hospitalizations:   Any hospitalizations or ED visits within the last 12 months?: No      Advance Care Planning:   Living will: No    Durable POA for healthcare: No    Advanced directive: No    Advanced directive counseling given: Yes    Five wishes given: Yes    Patient declined ACP directive: No    End of Life Decisions reviewed with patient: Yes    Provider agrees with end of life decisions: Yes      Cognitive Screening:   Provider or family/friend/caregiver concerned regarding cognition?: No    PREVENTIVE SCREENINGS      Cardiovascular Screening:    General: History Lipid Disorder, Risks and Benefits Discussed and Screening Current      Diabetes Screening:     General: Screening Current and Risks and Benefits Discussed      Colorectal Cancer Screening:     General: Screening Current and Risks and Benefits Discussed    Due for: FOBT/FIT      Breast Cancer Screening:     General: Risks and Benefits Discussed    Due for: Mammogram        Cervical Cancer Screening:    General: Risks and Benefits Discussed and Screening Current    Due for: Cervical Pap Smear      Osteoporosis Screening:    General: Risks and Benefits Discussed    Due for: DXA Axial      Abdominal Aortic Aneurysm (AAA) Screening:        General: Risks and Benefits Discussed and Screening Current      Lung Cancer Screening:     General: Risks and Benefits Discussed    Due for: Low Dose CT (LDCT)      Hepatitis C Screening:    General: Risks and Benefits Discussed    Hep C Screening Accepted: Yes      Screening, Brief Intervention, and Referral to Treatment (SBIRT)    Screening  Typical number of drinks in a day: 0    Single Item Drug Screening:  How often have you used an illegal drug (including marijuana) or a prescription medication for non-medical reasons in the past year? never    Single Item Drug Screen Score: 0  Interpretation: Negative screen for possible drug use disorder    Other Counseling Topics:   Car/seat belt/driving safety, skin self-exam, sunscreen and calcium and vitamin D intake and regular weightbearing exercise       No exam data present     Physical Exam:     /78   Pulse 89   Temp 98 1 °F (36 7 °C)   Resp 16   Ht 5' 8" (1 727 m)   Wt 124 kg (274 lb)   LMP 01/01/2004   SpO2 97%   BMI 41 66 kg/m²     Physical Exam     KELLIE Ward

## 2021-03-17 NOTE — PATIENT INSTRUCTIONS
Medicare Preventive Visit Patient Instructions  Thank you for completing your Welcome to Medicare Visit or Medicare Annual Wellness Visit today  Your next wellness visit will be due in one year (3/18/2022)  The screening/preventive services that you may require over the next 5-10 years are detailed below  Some tests may not apply to you based off risk factors and/or age  Screening tests ordered at today's visit but not completed yet may show as past due  Also, please note that scanned in results may not display below  Preventive Screenings:  Service Recommendations Previous Testing/Comments   Colorectal Cancer Screening  * Colonoscopy    * Fecal Occult Blood Test (FOBT)/Fecal Immunochemical Test (FIT)  * Fecal DNA/Cologuard Test  * Flexible Sigmoidoscopy Age: 54-65 years old   Colonoscopy: every 10 years (may be performed more frequently if at higher risk)  OR  FOBT/FIT: every 1 year  OR  Cologuard: every 3 years  OR  Sigmoidoscopy: every 5 years  Screening may be recommended earlier than age 48 if at higher risk for colorectal cancer  Also, an individualized decision between you and your healthcare provider will decide whether screening between the ages of 74-80 would be appropriate  Colonoscopy: 02/01/2016  FOBT/FIT: Not on file  Cologuard: Not on file  Sigmoidoscopy: Not on file    Screening Current  Screening Current     Breast Cancer Screening Age: 36 years old  Frequency: every 1-2 years  Not required if history of left and right mastectomy Mammogram: 07/09/2018        Cervical Cancer Screening Between the ages of 21-29, pap smear recommended once every 3 years  Between the ages of 33-67, can perform pap smear with HPV co-testing every 5 years     Recommendations may differ for women with a history of total hysterectomy, cervical cancer, or abnormal pap smears in past  Pap Smear: 01/21/2020    Screening Not Indicated  Screening Not Indicated   Hepatitis C Screening Once for adults born between 1945 and 1965  More frequently in patients at high risk for Hepatitis C Hep C Antibody: Not on file        Diabetes Screening 1-2 times per year if you're at risk for diabetes or have pre-diabetes Fasting glucose: 89 mg/dL   A1C: 5 5 %    Screening Current  Screening Current   Cholesterol Screening Once every 5 years if you don't have a lipid disorder  May order more often based on risk factors  Lipid panel: 03/03/2021    Screening Not Indicated  History Lipid Disorder  Screening Not Indicated  History Lipid Disorder     Other Preventive Screenings Covered by Medicare:  1  Abdominal Aortic Aneurysm (AAA) Screening: covered once if your at risk  You're considered to be at risk if you have a family history of AAA  2  Lung Cancer Screening: covers low dose CT scan once per year if you meet all of the following conditions: (1) Age 50-69; (2) No signs or symptoms of lung cancer; (3) Current smoker or have quit smoking within the last 15 years; (4) You have a tobacco smoking history of at least 30 pack years (packs per day multiplied by number of years you smoked); (5) You get a written order from a healthcare provider  3  Glaucoma Screening: covered annually if you're considered high risk: (1) You have diabetes OR (2) Family history of glaucoma OR (3)  aged 48 and older OR (3)  American aged 72 and older  3  Osteoporosis Screening: covered every 2 years if you meet one of the following conditions: (1) You're estrogen deficient and at risk for osteoporosis based off medical history and other findings; (2) Have a vertebral abnormality; (3) On glucocorticoid therapy for more than 3 months; (4) Have primary hyperparathyroidism; (5) On osteoporosis medications and need to assess response to drug therapy  · Last bone density test (DXA Scan): Not on file  5  HIV Screening: covered annually if you're between the age of 12-76   Also covered annually if you are younger than 13 and older than 72 with risk factors for HIV infection  For pregnant patients, it is covered up to 3 times per pregnancy  Immunizations:  Immunization Recommendations   Influenza Vaccine Annual influenza vaccination during flu season is recommended for all persons aged >= 6 months who do not have contraindications   Pneumococcal Vaccine (Prevnar and Pneumovax)  * Prevnar = PCV13  * Pneumovax = PPSV23   Adults 25-60 years old: 1-3 doses may be recommended based on certain risk factors  Adults 72 years old: Prevnar (PCV13) vaccine recommended followed by Pneumovax (PPSV23) vaccine  If already received PPSV23 since turning 65, then PCV13 recommended at least one year after PPSV23 dose  Hepatitis B Vaccine 3 dose series if at intermediate or high risk (ex: diabetes, end stage renal disease, liver disease)   Tetanus (Td) Vaccine - COST NOT COVERED BY MEDICARE PART B Following completion of primary series, a booster dose should be given every 10 years to maintain immunity against tetanus  Td may also be given as tetanus wound prophylaxis  Tdap Vaccine - COST NOT COVERED BY MEDICARE PART B Recommended at least once for all adults  For pregnant patients, recommended with each pregnancy  Shingles Vaccine (Shingrix) - COST NOT COVERED BY MEDICARE PART B  2 shot series recommended in those aged 48 and above     Health Maintenance Due:      Topic Date Due    Hepatitis C Screening  1955    Cervical Cancer Screening  01/21/2021    Colorectal Cancer Screening  02/01/2026     Immunizations Due:      Topic Date Due    COVID-19 Vaccine (1 of 2) [birth year]1970     Advance Directives   What are advance directives? Advance directives are legal documents that state your wishes and plans for medical care  These plans are made ahead of time in case you lose your ability to make decisions for yourself  Advance directives can apply to any medical decision, such as the treatments you want, and if you want to donate organs     What are the types of advance directives? There are many types of advance directives, and each state has rules about how to use them  You may choose a combination of any of the following:  · Living will: This is a written record of the treatment you want  You can also choose which treatments you do not want, which to limit, and which to stop at a certain time  This includes surgery, medicine, IV fluid, and tube feedings  · Durable power of  for healthcare [facility]): This is a written record that states who you want to make healthcare choices for you when you are unable to make them for yourself  This person, called a proxy, is usually a family member or a friend  You may choose more than 1 proxy  · Do not resuscitate (DNR) order:  A DNR order is used in case your heart stops beating or you stop breathing  It is a request not to have certain forms of treatment, such as CPR  A DNR order may be included in other types of advance directives  · Medical directive: This covers the care that you want if you are in a coma, near death, or unable to make decisions for yourself  You can list the treatments you want for each condition  Treatment may include pain medicine, surgery, blood transfusions, dialysis, IV or tube feedings, and a ventilator (breathing machine)  · Values history: This document has questions about your views, beliefs, and how you feel and think about life  This information can help others choose the care that you would choose  Why are advance directives important? An advance directive helps you control your care  Although spoken wishes may be used, it is better to have your wishes written down  Spoken wishes can be misunderstood, or not followed  Treatments may be given even if you do not want them  An advance directive may make it easier for your family to make difficult choices about your care     Weight Management   Why it is important to manage your weight:  Being overweight increases your risk of health conditions such as heart disease, high blood pressure, type 2 diabetes, and certain types of cancer  It can also increase your risk for osteoarthritis, sleep apnea, and other respiratory problems  Aim for a slow, steady weight loss  Even a small amount of weight loss can lower your risk of health problems  How to lose weight safely:  A safe and healthy way to lose weight is to eat fewer calories and get regular exercise  You can lose up about 1 pound a week by decreasing the number of calories you eat by 500 calories each day  Healthy meal plan for weight management:  A healthy meal plan includes a variety of foods, contains fewer calories, and helps you stay healthy  A healthy meal plan includes the following:  · Eat whole-grain foods more often  A healthy meal plan should contain fiber  Fiber is the part of grains, fruits, and vegetables that is not broken down by your body  Whole-grain foods are healthy and provide extra fiber in your diet  Some examples of whole-grain foods are whole-wheat breads and pastas, oatmeal, brown rice, and bulgur  · Eat a variety of vegetables every day  Include dark, leafy greens such as spinach, kale, nataliia greens, and mustard greens  Eat yellow and orange vegetables such as carrots, sweet potatoes, and winter squash  · Eat a variety of fruits every day  Choose fresh or canned fruit (canned in its own juice or light syrup) instead of juice  Fruit juice has very little or no fiber  · Eat low-fat dairy foods  Drink fat-free (skim) milk or 1% milk  Eat fat-free yogurt and low-fat cottage cheese  Try low-fat cheeses such as mozzarella and other reduced-fat cheeses  · Choose meat and other protein foods that are low in fat  Choose beans or other legumes such as split peas or lentils  Choose fish, skinless poultry (chicken or turkey), or lean cuts of red meat (beef or pork)  Before you cook meat or poultry, cut off any visible fat  · Use less fat and oil    Try baking foods instead of frying them  Add less fat, such as margarine, sour cream, regular salad dressing and mayonnaise to foods  Eat fewer high-fat foods  Some examples of high-fat foods include french fries, doughnuts, ice cream, and cakes  · Eat fewer sweets  Limit foods and drinks that are high in sugar  This includes candy, cookies, regular soda, and sweetened drinks  Exercise:  Exercise at least 30 minutes per day on most days of the week  Some examples of exercise include walking, biking, dancing, and swimming  You can also fit in more physical activity by taking the stairs instead of the elevator or parking farther away from stores  Ask your healthcare provider about the best exercise plan for you  © Copyright [employer] 2018 Information is for End User's use only and may not be sold, redistributed or otherwise used for commercial purposes   All illustrations and images included in CareNotes® are the copyrighted property of [employer]  or [address] [place] [address]

## 2021-03-17 NOTE — PROGRESS NOTES
Assessment/Plan:      Diagnoses and all orders for this visit:    Encounter for annual wellness exam in Medicare patient    Pure hypercholesterolemia    Vitamin B12 deficiency  -     Vitamin B12; Future    Vitamin D deficiency  -     Vitamin D 25 hydroxy; Future    Anxiety state    Essential hypertension  -     Ambulatory referral to Cardiology; Future  -     ECG 12 lead; Future  -     XR chest pa & lateral; Future  -     Troponin I; Future  -     CK (with reflex to MB); Future    Acquired hypothyroidism    Gastroesophageal reflux disease with esophagitis without hemorrhage    Breast cancer screening by mammogram  -     Mammo screening bilateral w 3d & cad; Future    Osteopenia, unspecified location  -     DXA bone density spine hip and pelvis; Future    Postmenopausal  -     DXA bone density spine hip and pelvis; Future    Body mass index (BMI)40 0-44 9, adult (HCC)    Tobacco user  -     CT lung screening program; Future    Encounter for screening for lung cancer  -     CT lung screening program; Future    Anemia, unspecified type  -     CBC and differential; Future    Hyperglycemia  -     Comprehensive metabolic panel; Future  -     Hemoglobin A1C; Future  -     Insulin, fasting; Future    Other hyperlipidemia  -     Lipid panel; Future  -     Ambulatory referral to Cardiology; Future  -     ECG 12 lead; Future  -     XR chest pa & lateral; Future  -     Troponin I; Future  -     CK (with reflex to MB); Future    Thyroid disorder  -     TSH, 3rd generation; Future    Encounter for screening for HIV  -     HIV 1/2 Antigen/Antibody (4th Generation) w Reflex [facility]; Future    Need for hepatitis C screening test  -     Hepatitis C antibody; Future    Chest pressure  -     Ambulatory referral to Cardiology; Future  -     ECG 12 lead; Future  -     XR chest pa & lateral; Future  -     Troponin I; Future  -     CK (with reflex to MB); Future    Palpitations  -     Ambulatory referral to Cardiology;  Future  -     ECG 12 lead; Future  -     XR chest pa & lateral; Future  -     Troponin I; Future  -     CK (with reflex to MB); Future    Acute pain of left shoulder  -     Ambulatory referral to Cardiology; Future  -     ECG 12 lead; Future  -     XR chest pa & lateral; Future  -     Troponin I; Future  -     CK (with reflex to MB); Future    Other orders  -     Ergocalciferol (VITAMIN D2 PO); take 1 capsule by mouth every week          Subjective:     Patient ID: Tomasz Cr is a 78 y o  female  Patient presents to office for annual medicare wellness exam, follow up, recheck, and review lab results  Complete medical history and medications reviewed with patient and tolerating all medications well without any problems  C/O palpitations that come and go occurring for the past week  Patient states she notices the palpitations occurring more during the day and last week was having palpitations occurring every day  Denies SOB or cough  Feels like a chest pressure radiating from left side across mid chest to right side  Denies indigestion  Does have pain in left shoulder blade occurring since starting with chest pressure and palpitations  Does have SOB with walking upstairs  Patient sometimes to forgets to take her Cholesterol medication at night and instructed on importance of taking it daily even with dinner at night  Patient verbalizes understanding  Denies any other problems or concerns at the present time  Review of Systems    GENERAL:  Feels well, denies any significant changes in weight without trying  SKIN:  Denies rashes, lesions, opened areas, wounds, change in moles or any other skin changes  HEENT:  Denies any head injury or headaches  Negative blurred vision, floaters, spots before eyes, infections, or other vision problems  Negative significant changes in vision or hearing  Negative tinnitus, vertigo, or infections    Negative hay fever, sinus trouble, nasal discharge, bloody noses, or problems with smell  Negative sore throat, bleeding gums, ulcers, or sores  NECK:  Denies lumps, goiter, pain, swollen glands, or lymphadenopathy  BREASTS:  Denies lumps, pain, nipple discharge, swelling, redness, or any other changes  RESPIRATORY:  Denies cough, wheezing, dyspnea, or orthopnea  C/O SOB with exertion upstairs  CARDIOVASCULAR:  C/O chest palpitations occurring and feels chest heaviness of left chest radiating to mid and right chest area  C/O pain in left shoulder blade occurring since palpitations and chest pressure started  GASTROINTESTINAL:  Appetite good, denies nausea, vomiting, or indigestion  Bowel movements normal occurring about once daily or every other day  URINARY:  Denies frequency, urgency, incontinence, dysuria, hematuria, nocturia, or recent flank pain  GENITAL:  Denies vaginal discharge, pelvic infection, lesions, ulcers, or pain  Negative dyspareunia or abnormal vaginal bleeding  PERIPHERAL VASCULAR:  Denies varicosities, swelling, skin changes, or pain  MUSCULOSKELETAL:  Denies back, joint, or muscle pain  Negative problems with mobility or movement  PSYCHIATRIC:  Denies problems with depression, anxiety, anger, or other psychiatric symptoms  NEUROLOGIC:  Denies fainting, dizziness, memory problems, seizures, tingling, motor or sensory loss  HEMATOLOGIC:  Denies easy bruising, bleeding, or anemia  ENDOCRINE:  Denies thyroid problems, temperature intolerance, excessive sweating, or other endocrine symptoms  Objective:     Physical Exam  Vitals signs and nursing note reviewed  GENERAL:  Appears well nourished, well groomed, in no acute distress  SKIN:  Palms warm, dry, color good  Nails without clubbing or cyanosis  No lesions, ulcerations, or wounds  HEAD:  Hair is average texture  Scalp without lesions, normocephalic, and atraumatic  EYES/EARS/NOSE/MOUTH: Deferred due to masking policy     NECK:  Supple, trachea midline, Negative thyromegaly, lymphadenopathy, or swollen glands  LYMPH NODES:  Negative enlargement of neck, axillary, epitrochlear, or inguinal nodes  THORAX/LUNGS  Thorax symmetric with good excursion  Lungs resonant  Breath sounds vesicular with no added sounds  Diaphragm descends within normal limits  CARDIOVASCULAR:  Carotid upstrokes brisk and without bruits  Apical impulse discrete and tapping, barely palpable in the 5th ICS/MCL  Normal S1 and Normal S2, Negative S3 or S4  Negative murmurs, thrills, lifts, or heaves  ABDOMEN:  Protuberant, bowel sounds normal active x 4 quadrants  Negative tenderness  Negative masses  Negative hepatomegaly  Negative splenomegaly  Negative costovertebral tenderness  EXTREMITIES:  Warm, calves supple, non-tender, negative for edema  Negative stasis pigmentation or ulcers  +2 pulses throughout  MUSCULOSKELETAL:  Negative joint deformities  Good range of motion in hands, wrists, elbows, shoulders, spine, hips, knees, and ankles  NEUROLOGICAL:  Mental status:  Awake, alert, and oriented to person, place, time, and event  Normal thought processes

## 2021-04-02 ENCOUNTER — HOSPITAL ENCOUNTER (OUTPATIENT)
Dept: NON INVASIVE DIAGNOSTICS | Facility: HOSPITAL | Age: 66
Discharge: HOME | End: 2021-04-02
Payer: COMMERCIAL

## 2021-04-02 ENCOUNTER — TELEPHONE (OUTPATIENT)
Dept: FAMILY MEDICINE CLINIC | Facility: CLINIC | Age: 66
End: 2021-04-02

## 2021-04-02 ENCOUNTER — OFFICE VISIT (OUTPATIENT)
Dept: FAMILY MEDICINE CLINIC | Facility: CLINIC | Age: 66
End: 2021-04-02
Payer: COMMERCIAL

## 2021-04-02 VITALS
TEMPERATURE: 97.9 F | SYSTOLIC BLOOD PRESSURE: 142 MMHG | RESPIRATION RATE: 18 BRPM | HEIGHT: 68 IN | WEIGHT: 276 LBS | HEART RATE: 80 BPM | BODY MASS INDEX: 41.83 KG/M2 | OXYGEN SATURATION: 96 % | DIASTOLIC BLOOD PRESSURE: 72 MMHG

## 2021-04-02 DIAGNOSIS — I82.532 CHRONIC DEEP VEIN THROMBOSIS (DVT) OF POPLITEAL VEIN OF LEFT LOWER EXTREMITY (HCC): ICD-10-CM

## 2021-04-02 DIAGNOSIS — M79.662 PAIN IN LEFT LOWER LEG: Primary | ICD-10-CM

## 2021-04-02 DIAGNOSIS — K14.8 TONGUE LESION: ICD-10-CM

## 2021-04-02 DIAGNOSIS — M79.662 PAIN IN LEFT LOWER LEG: ICD-10-CM

## 2021-04-02 PROCEDURE — 93971 EXTREMITY STUDY: CPT | Performed by: SURGERY

## 2021-04-02 PROCEDURE — 99213 OFFICE O/P EST LOW 20 MIN: CPT | Performed by: FAMILY MEDICINE

## 2021-04-02 PROCEDURE — 3077F SYST BP >= 140 MM HG: CPT | Performed by: FAMILY MEDICINE

## 2021-04-02 PROCEDURE — 93971 EXTREMITY STUDY: CPT

## 2021-04-02 PROCEDURE — 3078F DIAST BP <80 MM HG: CPT | Performed by: FAMILY MEDICINE

## 2021-04-02 RX ORDER — CLOTRIMAZOLE 10 MG/1
10 LOZENGE ORAL; TOPICAL
Qty: 50 TABLET | Refills: 0 | Status: SHIPPED | OUTPATIENT
Start: 2021-04-02 | End: 2021-07-19 | Stop reason: ALTCHOICE

## 2021-04-04 NOTE — PROGRESS NOTES
Assessment/Plan:    Jack Samuel is a 78year old female with past medical history of GERD with esophagitis, hypothyroidism, hypertension, morbid obesity, history of DVT in LLE presents today complaining of 1 week duration left posterior knee pain  Left Gilberto's test positive  Venous Doppler of LLE showed chronic deep vein thrombosis in the popliteal vein but no acute DVT or superficial thrombophlebitis noted  Patient was previously treated with anticoagulation when she was diagnosed with DVT in 2016  No additional anticoagulation warrant at this time  Case d/w Dr Lee Head  Diagnoses and all orders for this visit:    Pain in left lower leg  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  -     VAS lower limb venous duplex study, unilateral/limited; Future  Venous doppler of left lower limb showed chronic deep vein thrombosis noted in the popliteal vein  Tongue lesion  -     clotrimazole (MYCELEX) 10 mg jose; Take 1 tablet (10 mg total) by mouth 5 (five) times a day        Subjective:      Patient ID: Gabi Reyes is a 78 y o  female  HPI     Patient is a 78years old female with past medical history of GERD with esophagitis, hypothyroidism, hypertension, morbid obesity, history of DVT in LLE presents today complaining of 1 week duration left posterior knee pain  Left posterior knee pain is constant, radiates toward left ankle  Patient does have history of DVT in left lower extremity in April 2016  Denied any recent fall or trauma  Denied knee locking, buckling, or swelling  Patient tried voltaren tablet without relief  Denied any recent travel or surgery  Patient also c/o burning sensation in her tongue for past few days       The following portions of the patient's history were reviewed and updated as appropriate: allergies, current medications, past family history, past medical history, past social history, past surgical history and problem list     Review of Systems   Constitutional: Negative for chills and fever  HENT: Negative for congestion, rhinorrhea and sore throat  Eyes: Negative for visual disturbance  Respiratory: Negative for cough and shortness of breath  Cardiovascular: Negative for chest pain and palpitations  Gastrointestinal: Negative for abdominal pain, nausea and vomiting  Genitourinary: Negative for dysuria  Musculoskeletal: Positive for myalgias (left posterior knee pain)  Negative for back pain  Skin: Negative for color change  Neurological: Negative for dizziness and headaches  Hematological: Does not bruise/bleed easily  Psychiatric/Behavioral: Negative for confusion  Objective:      /72 (BP Location: Left arm, Patient Position: Sitting, Cuff Size: Large)   Pulse 80   Temp 97 9 °F (36 6 °C) (Temporal)   Resp 18   Ht 5' 8" (1 727 m)   Wt 125 kg (276 lb)   LMP 01/01/2004   SpO2 96%   BMI 41 97 kg/m²          Physical Exam  Vitals signs and nursing note reviewed  Constitutional:       General: She is not in acute distress  Appearance: She is well-developed  HENT:      Head: Normocephalic  Mouth/Throat:      Pharynx: No oropharyngeal exudate  Eyes:      General: No scleral icterus  Right eye: No discharge  Left eye: No discharge  Conjunctiva/sclera: Conjunctivae normal    Neck:      Musculoskeletal: Normal range of motion  Cardiovascular:      Rate and Rhythm: Normal rate and regular rhythm  Heart sounds: Normal heart sounds  No murmur  Pulmonary:      Effort: Pulmonary effort is normal  No respiratory distress  Breath sounds: Normal breath sounds  No wheezing  Abdominal:      General: Bowel sounds are normal  There is no distension  Palpations: Abdomen is soft  Tenderness: There is no abdominal tenderness  Musculoskeletal:         General: No tenderness  Comments: Pain at left posterior knee on dorsiflexion of left foot      Lymphadenopathy:      Cervical: No cervical adenopathy  Skin:     Findings: No erythema  Neurological:      Mental Status: She is alert and oriented to person, place, and time     Psychiatric:         Behavior: Behavior normal

## 2021-04-05 ENCOUNTER — TELEPHONE (OUTPATIENT)
Dept: FAMILY MEDICINE CLINIC | Facility: CLINIC | Age: 66
End: 2021-04-05

## 2021-04-05 NOTE — PROGRESS NOTES
Cardiology Follow Up    Marjorie Samayoa  1955  [id number]  [address]  [address] [facility] [address]  [place] [address]  [phone]    1  Essential hypertension     2  Dyslipidemia     3  Chronic deep vein thrombosis (DVT) of left popliteal vein (HCC)     4  Body mass index (BMI)40 0-44 9, adult (HCC)     5  Palpitations  POCT ECG    AMB extended holter monitor       Discussion/Summary:  Since her last office visit she has been having palpitations  Will order a 2 week zio patch for symptom-rhythm correlation  Recent labs show stable electrolytes and normal TSH  Her blood pressure is elevated today  It came down to 158/90 on my recheck  It was controlled at her past two office visits  She has had high sodium foods such as ham the past two days  She was advised to follow a low sodium diet  She will monitor her blood pressure daily and to call the office in 1 week with updated readings  Her lipids have increased from July 2020  Most recent LDL was 129  She admits that she was forgetting to take her atorvastatin some nights but has since been compliant  Lifestyle modifications were discussed  A repeat lipid panel can be checked after her next visit  Smoking cessation is imperative and was discussed  Patient not willing to quit at this time  She will RTO in 4 months with Dr Lesly To or sooner if necessary  She will call with any concerns  Interval History: Marjorie Samayoa is a 78 y o  female with hypertension, dyslipidemia, obesity, chronic DVT who presents to the office today for routine follow up  Since her last office visit she has noted occasional palpitations  She describes the palpitations as "fluttering" in her chest  They would typically last for 30 minutes at a time  They were occurring a few times per week but now she has not had any for the past few weeks   She is sedentary and has gained weight over the course of the pandemic  She does note shortness of breath if she ascends steps  She denies any chest pain/pressure/discomfort  She denies lightheadedness, dizziness, and syncope  She has been eating high sodium foods recently  She has also started to smoke again approximately 5 cigarettes daily      Problem List     Acquired hypothyroidism    Essential hypertension    Pure hypercholesterolemia    Vitamin D deficiency    Vitamin B12 deficiency    Pneumonia of left lower lobe due to infectious organism    Gastroesophageal reflux disease with esophagitis    Diverticulitis    Anxiety state    Body mass index (BMI)40 0-44 9, adult ([facility])        Past Medical History:   Diagnosis Date    Allergic rhinitis     Carpal tunnel syndrome on both sides     Cervical disc herniation     DVT (deep venous thrombosis) ([facility])     DVT Left Leg    Fracture of foot     left casted    History of mammogram 2017    Being followed by Dr Alicia Sequeira History of Papanicolaou smear of cervix     Dr Alicia Sequeira Hx of colonoscopy 2016    Dr Burt Corado    Hyperlipemia     Hypertension     Hypothyroidism     Lichen sclerosus     Renal calculi     Rotator cuff tear, left      Social History     Socioeconomic History    Marital status: /Civil Union     Spouse name: Not on file    Number of children: Not on file    Years of education: Not on file    Highest education level: Not on file   Occupational History    Not on file   Social Needs    Financial resource strain: Not on file    Food insecurity     Worry: Not on file     Inability: Not on file   [employer] needs     Medical: Not on file     Non-medical: Not on file   Tobacco Use    Smoking status: Former Smoker     Packs/day: 0 30     Years: 10 00     Pack years: 3 00     Types: Cigarettes     Quit date: 2019     Years since quittin 1    Smokeless tobacco: Never Used    Tobacco comment: quite date 1 year   Substance and Sexual Activity    Alcohol use: No    Drug use: No    Sexual activity: Not Currently   Lifestyle    Physical activity     Days per week: Not on file     Minutes per session: Not on file    Stress: Not on file   Relationships    Social connections     Talks on phone: Not on file     Gets together: Not on file     Attends Rastafari service: Not on file     Active member of club or organization: Not on file     Attends meetings of clubs or organizations: Not on file     Relationship status: Not on file    Intimate partner violence     Fear of current or ex partner: Not on file     Emotionally abused: Not on file     Physically abused: Not on file     Forced sexual activity: Not on file   Other Topics Concern    Not on file   Social History Narrative    Not on file      Family History   Problem Relation Age of Onset    Hypertension Mother     Hyperlipidemia Mother     Hypothyroidism Mother     Brain cancer Mother     Hypertension Father     Celiac disease Father     Ovarian cancer Sister     Celiac disease Brother     Hypothyroidism Brother     Diabetes Paternal Grandmother     Hypothyroidism Sister     Coronary artery disease Brother     Hypertension Brother     Hyperlipidemia Brother     Diabetes Sister      Past Surgical History:   Procedure Laterality Date    CERVICAL LAMINECTOMY N/A     CHOLECYSTECTOMY N/A     KNEE CARTILAGE SURGERY Bilateral     LITHOTRIPSY N/A     NEUROPLASTY / TRANSPOSITION MEDIAN NERVE AT CARPAL TUNNEL BILATERAL Bilateral     ROTATOR CUFF REPAIR Left     TONSILLECTOMY AND ADENOIDECTOMY Bilateral     TONSILLECTOMY AND ADENOIDECTOMY      TUBAL LIGATION N/A     US GUIDED THYROID BIOPSY  11/2018    WRIST FRACTURE SURGERY Right     plates and pins        Current Outpatient Medications:     ascorbic acid (VITAMIN C) 250 mg tablet, Take 500 mg by mouth daily, Disp: , Rfl:     aspirin (ECOTRIN LOW STRENGTH) 81 mg EC tablet, Take 1 tablet (81 mg total) by mouth daily, Disp: 90 tablet, Rfl: 1    atorvastatin (LIPITOR) 40 mg tablet, Take 1 tablet (40 mg total) by mouth daily, Disp: 90 tablet, Rfl: 1    diclofenac sodium (VOLTAREN) 50 mg EC tablet, Take 1 tablet (50 mg total) by mouth 2 (two) times a day, Disp: 180 tablet, Rfl: 1    Ergocalciferol (VITAMIN D2 PO), take 1 capsule by mouth every week, Disp: , Rfl:     fluticasone (FLONASE) 50 mcg/act nasal spray, 2 sprays into each nostril daily as needed for rhinitis or allergies, Disp: 16 g, Rfl: 5    ketoconazole (NIZORAL) 2 % cream, Apply topically 2 (two) times a day as needed for itching, irritation or rash, Disp: 60 g, Rfl: 5    KP Vitamin B-12 1000 MCG tablet, Take 1 tablet (1,000 mcg total) by mouth every other day, Disp: 90 tablet, Rfl: 1    levothyroxine 125 mcg tablet, Take 1 tablet (125 mcg total) by mouth daily in the early morning, Disp: 90 tablet, Rfl: 3    loratadine (CLARITIN) 10 mg tablet, 1 tab po daily x 14 days then prn allergies, Disp: 30 tablet, Rfl: 3    magnesium oxide (MAG-OX) 400 mg, Take 1 tablet (400 mg total) by mouth daily at bedtime, Disp: 90 tablet, Rfl: 1    pantoprazole (PROTONIX) 40 mg tablet, Take 1 tablet (40 mg total) by mouth daily, Disp: 90 tablet, Rfl: 1    RA CALCIUM 600/VITAMIN D-3 600-400 MG-UNIT TABS, Take 1 tablet by mouth 2 (two) times a day, Disp: , Rfl: 0    triamterene-hydrochlorothiazide (DYAZIDE) 37 5-25 mg per capsule, Take 1 capsule by mouth every morning, Disp: 30 capsule, Rfl: 5    Zinc 25 MG TABS, Take 1 tablet by mouth daily, Disp: , Rfl:     clotrimazole (MYCELEX) 10 mg jose, Take 1 tablet (10 mg total) by mouth 5 (five) times a day (Patient not taking: Reported on 4/6/2021), Disp: 50 tablet, Rfl: 0    ergocalciferol (VITAMIN D2) 50,000 units, Take 1 capsule (50,000 Units total) by mouth once a week (Patient not taking: Reported on 4/6/2021), Disp: 12 capsule, Rfl: 1    Glucosamine-Chondroitin 500-400 MG CAPS, Take by mouth, Disp: , Rfl:     Turmeric 500 MG CAPS, Take 2 capsules by mouth daily, Disp: , Rfl:   No Known Allergies    Labs:     Chemistry        Component Value Date/Time    K 4 1 03/03/2021 0904     03/03/2021 0904    CO2 27 03/03/2021 0904    BUN 19 03/03/2021 0904    CREATININE 1 06 03/03/2021 0904        Component Value Date/Time    CALCIUM 9 5 03/03/2021 0904    ALKPHOS 67 03/03/2021 0904    AST 28 03/03/2021 0904    ALT 44 03/03/2021 0904            No results found for: CHOL  Lab Results   Component Value Date    HDL 47 03/03/2021    HDL 45 07/10/2020     Lab Results   Component Value Date    LDLCALC 129 (H) 03/03/2021    LDLCALC 108 (H) 07/10/2020     Lab Results   Component Value Date    TRIG 171 (H) 03/03/2021    TRIG 136 07/10/2020     No results found for: CHOLHDL    Imaging: Vas Lower Limb Venous Duplex Study, Unilateral/limited    Result Date: 4/2/2021  Narrative:  [facility] REPORT CLINICAL: Indications: Patient presents with left lower extremity pain  Risk Factors The patient has history of Obesity, HTN, HLD, DVT and previous smoking  FINDINGS:  Segment    Right            Left                  Impression       Impression                           CFV        Normal (Patent)  Normal (Patent)                      Popliteal                   E1 Non Occlusive Thrombus (Chronic)     CONCLUSION:  Impression: RIGHT LOWER LIMB LIMITED: Evaluation shows no evidence of thrombus in the common femoral vein  Doppler evaluation shows a normal response to augmentation maneuvers  LEFT LOWER LIMB: Chronic deep vein thrombosis is noted in the popliteal vein  No evidence of superficial thrombophlebitis noted  Doppler evaluation shows a normal response to augmentation maneuvers  A non vascularized anechoic fluid collection measuring approximately 0 73 by 1 03 cm is noted in the popliteal fossa  Popliteal, posterior tibial and anterior tibial arterial Doppler waveforms are triphasic  Technical findings were given to Mita at Dr Carlos Enrique meyer  SIGNATURE: Electronically Signed by: Jr Holloway MD, RPVI on 2021-04-02 09:58:03 PM      ECG: sinus rhythm with occasional PVC's      Review of Systems   Constitution: Negative for chills and fever  HENT: Negative  Cardiovascular: Positive for dyspnea on exertion and palpitations  Negative for chest pain, leg swelling, near-syncope, orthopnea, paroxysmal nocturnal dyspnea and syncope  Respiratory: Negative for cough and shortness of breath  Gastrointestinal: Negative for diarrhea, nausea and vomiting  Genitourinary: Negative  Neurological: Negative for dizziness and light-headedness  All other systems reviewed and are negative  Vitals:    04/06/21 1443   BP: (!) 178/94   Pulse: 72     Vitals:    04/06/21 1443   Weight: 126 kg (277 lb)     Height: 5' 8" (172 7 cm)   Body mass index is 42 12 kg/m²  Physical Exam:  Physical Exam  Vitals signs reviewed  Constitutional:       General: She is not in acute distress  Appearance: She is obese  She is not diaphoretic  HENT:      Head: Normocephalic and atraumatic  Eyes:      Pupils: Pupils are equal, round, and reactive to light  Neck:      Musculoskeletal: Normal range of motion  Vascular: No carotid bruit  Cardiovascular:      Rate and Rhythm: Normal rate and regular rhythm  Pulses:           Radial pulses are 2+ on the right side and 2+ on the left side  Dorsalis pedis pulses are 1+ on the right side and 1+ on the left side  Heart sounds: S1 normal and S2 normal  No murmur  Pulmonary:      Effort: Pulmonary effort is normal  No respiratory distress  Breath sounds: Normal breath sounds  No wheezing or rales  Abdominal:      General: There is no distension  Palpations: Abdomen is soft  Tenderness: There is no abdominal tenderness  Musculoskeletal: Normal range of motion  General: No deformity  Right lower leg: No edema  Left lower leg: No edema     Skin:     General: Skin is warm and dry  Findings: No erythema  Neurological:      General: No focal deficit present  Mental Status: She is alert and oriented to person, place, and time        Gait: Gait normal    Psychiatric:         Mood and Affect: Mood normal          Behavior: Behavior normal

## 2021-04-05 NOTE — TELEPHONE ENCOUNTER
Please notify patient her PVR and Doppler was WNL but just shows left popliteal cyst which if it is causing her pain we can refer her to Orthopedic for treatment if she wants

## 2021-04-06 ENCOUNTER — OFFICE VISIT (OUTPATIENT)
Dept: CARDIOLOGY CLINIC | Facility: HOSPITAL | Age: 66
End: 2021-04-06
Payer: COMMERCIAL

## 2021-04-06 ENCOUNTER — CLINICAL SUPPORT (OUTPATIENT)
Dept: CARDIOLOGY CLINIC | Facility: HOSPITAL | Age: 66
End: 2021-04-06
Payer: COMMERCIAL

## 2021-04-06 VITALS
SYSTOLIC BLOOD PRESSURE: 178 MMHG | WEIGHT: 277 LBS | BODY MASS INDEX: 41.98 KG/M2 | DIASTOLIC BLOOD PRESSURE: 94 MMHG | HEIGHT: 68 IN | HEART RATE: 72 BPM

## 2021-04-06 DIAGNOSIS — R00.2 PALPITATIONS: ICD-10-CM

## 2021-04-06 DIAGNOSIS — I10 ESSENTIAL HYPERTENSION: Primary | ICD-10-CM

## 2021-04-06 DIAGNOSIS — I82.532 CHRONIC DEEP VEIN THROMBOSIS (DVT) OF LEFT POPLITEAL VEIN (HCC): ICD-10-CM

## 2021-04-06 DIAGNOSIS — E78.5 DYSLIPIDEMIA: ICD-10-CM

## 2021-04-06 PROCEDURE — 99214 OFFICE O/P EST MOD 30 MIN: CPT | Performed by: PHYSICIAN ASSISTANT

## 2021-04-06 PROCEDURE — 3008F BODY MASS INDEX DOCD: CPT | Performed by: PHYSICIAN ASSISTANT

## 2021-04-06 PROCEDURE — 1160F RVW MEDS BY RX/DR IN RCRD: CPT | Performed by: PHYSICIAN ASSISTANT

## 2021-04-06 PROCEDURE — 1036F TOBACCO NON-USER: CPT | Performed by: PHYSICIAN ASSISTANT

## 2021-04-06 PROCEDURE — 93000 ELECTROCARDIOGRAM COMPLETE: CPT | Performed by: PHYSICIAN ASSISTANT

## 2021-04-06 PROCEDURE — 93246 EXT ECG>7D<15D RECORDING: CPT | Performed by: INTERNAL MEDICINE

## 2021-04-06 RX ORDER — MULTIVIT WITH MINERALS/LUTEIN
500 TABLET ORAL DAILY
COMMUNITY

## 2021-04-21 ENCOUNTER — TELEPHONE (OUTPATIENT)
Dept: ADMINISTRATIVE | Facility: OTHER | Age: 66
End: 2021-04-21

## 2021-04-21 DIAGNOSIS — M85.80 OSTEOPENIA, UNSPECIFIED LOCATION: ICD-10-CM

## 2021-04-21 DIAGNOSIS — Z12.31 BREAST CANCER SCREENING BY MAMMOGRAM: ICD-10-CM

## 2021-04-21 DIAGNOSIS — Z78.0 POSTMENOPAUSAL: ICD-10-CM

## 2021-04-21 NOTE — TELEPHONE ENCOUNTER
----- Message from Elizabeth Marquez sent at 4/21/2021  7:13 AM EDT -----  Regarding: Care Gap Request  04/21/21 7:13 AM    Hello, our patient attached above has had DEXA Scan and Mammogram completed/performed  Please assist in updating the patient chart by pulling the Care Everywhere (CE) document  The date of service is 2021       Thank you,  Christopher Burnette MA  [facility] PRIMARY [facility]

## 2021-04-21 NOTE — TELEPHONE ENCOUNTER
Upon review of the In Basket request we were able to locate, review, and update the patient chart as requested for DEXA Scan and Mammogram     Any additional questions or concerns should be emailed to the Practice Liaisons via [email or web address] email, please do not reply via In Basket      Thank you  René Baez

## 2021-05-03 ENCOUNTER — CLINICAL SUPPORT (OUTPATIENT)
Dept: CARDIOLOGY CLINIC | Facility: HOSPITAL | Age: 66
End: 2021-05-03
Payer: COMMERCIAL

## 2021-05-03 DIAGNOSIS — R00.2 PALPITATIONS: ICD-10-CM

## 2021-05-03 PROCEDURE — 93244 EXT ECG>48HR<7D REV&INTERPJ: CPT | Performed by: INTERNAL MEDICINE

## 2021-07-19 ENCOUNTER — OFFICE VISIT (OUTPATIENT)
Dept: FAMILY MEDICINE CLINIC | Facility: CLINIC | Age: 66
End: 2021-07-19
Payer: COMMERCIAL

## 2021-07-19 VITALS
RESPIRATION RATE: 16 BRPM | SYSTOLIC BLOOD PRESSURE: 122 MMHG | TEMPERATURE: 97.4 F | BODY MASS INDEX: 40.92 KG/M2 | WEIGHT: 270 LBS | HEART RATE: 67 BPM | DIASTOLIC BLOOD PRESSURE: 62 MMHG | HEIGHT: 68 IN | OXYGEN SATURATION: 97 %

## 2021-07-19 DIAGNOSIS — E78.00 PURE HYPERCHOLESTEROLEMIA: ICD-10-CM

## 2021-07-19 DIAGNOSIS — I82.532 CHRONIC DEEP VEIN THROMBOSIS (DVT) OF POPLITEAL VEIN OF LEFT LOWER EXTREMITY (HCC): Primary | ICD-10-CM

## 2021-07-19 DIAGNOSIS — E03.9 ACQUIRED HYPOTHYROIDISM: ICD-10-CM

## 2021-07-19 DIAGNOSIS — E53.8 VITAMIN B12 DEFICIENCY: ICD-10-CM

## 2021-07-19 DIAGNOSIS — B37.2 CANDIDIASIS OF SKIN: ICD-10-CM

## 2021-07-19 DIAGNOSIS — E55.9 VITAMIN D DEFICIENCY: ICD-10-CM

## 2021-07-19 DIAGNOSIS — I10 ESSENTIAL HYPERTENSION: ICD-10-CM

## 2021-07-19 DIAGNOSIS — M25.562 ACUTE PAIN OF LEFT KNEE: ICD-10-CM

## 2021-07-19 DIAGNOSIS — K21.00 GASTROESOPHAGEAL REFLUX DISEASE WITH ESOPHAGITIS WITHOUT HEMORRHAGE: ICD-10-CM

## 2021-07-19 PROCEDURE — 1036F TOBACCO NON-USER: CPT | Performed by: NURSE PRACTITIONER

## 2021-07-19 PROCEDURE — 3078F DIAST BP <80 MM HG: CPT | Performed by: NURSE PRACTITIONER

## 2021-07-19 PROCEDURE — 3008F BODY MASS INDEX DOCD: CPT | Performed by: NURSE PRACTITIONER

## 2021-07-19 PROCEDURE — 1160F RVW MEDS BY RX/DR IN RCRD: CPT | Performed by: NURSE PRACTITIONER

## 2021-07-19 PROCEDURE — 99214 OFFICE O/P EST MOD 30 MIN: CPT | Performed by: NURSE PRACTITIONER

## 2021-07-19 PROCEDURE — 3074F SYST BP LT 130 MM HG: CPT | Performed by: NURSE PRACTITIONER

## 2021-07-19 RX ORDER — KETOCONAZOLE 20 MG/G
CREAM TOPICAL 2 TIMES DAILY PRN
Qty: 60 G | Refills: 5 | Status: SHIPPED | OUTPATIENT
Start: 2021-07-19 | End: 2022-04-18 | Stop reason: SDUPTHER

## 2021-07-19 NOTE — PROGRESS NOTES
Assessment/Plan:      Diagnoses and all orders for this visit:    Chronic deep vein thrombosis (DVT) of popliteal vein of left lower extremity ([address] )  -     Ambulatory referral to Vascular Surgery; Future    Acute pain of left knee  -     Ambulatory referral to Vascular Surgery; Future    Acquired hypothyroidism    Gastroesophageal reflux disease with esophagitis without hemorrhage    Essential hypertension    Pure hypercholesterolemia    Vitamin B12 deficiency    Vitamin D deficiency    Candidiasis of skin  -     ketoconazole (NIZORAL) 2 % cream; Apply topically 2 (two) times a day as needed for itching, irritation or rash          Subjective:     Patient ID: Thaddeus Marie is a 78 y o  female  Patient presents to office for follow up and recheck  Complete medical history and medications reviewed with patient and tolerating all medications well without any problems  Patient states the pain behind left knee is improving somewhat from her last visit  Upon review of her Venous Duplex of Left Lower Extremity results show left popliteal baker's cyst and chronic DVT of left popliteal vein  Patient completed Mycelex Lozenges which helped resolve her tongue lesion without any problems  Denies any new problems or concerns at the present time  Review of Systems    GENERAL:  Feels well, denies any significant changes in weight without trying  SKIN:  Denies rashes, lesions, opened areas, wounds, change in moles or any other skin changes  HEENT:  Denies any head injury or headaches  Negative blurred vision, floaters, spots before eyes, infections, or other vision problems  Negative significant changes in vision or hearing  Negative tinnitus, vertigo, or infections  Negative hay fever, sinus trouble, nasal discharge, bloody noses, or problems with smell  Negative sore throat, bleeding gums, ulcers, or sores  Tongue lesion resolved     NECK:  Denies lumps, goiter, pain, swollen glands, or lymphadenopathy  BREASTS:  Denies lumps, pain, nipple discharge, swelling, redness, or any other changes  RESPIRATORY:  Denies cough, wheezing, shortness of breath, dyspnea, or orthopnea  CARDIOVASCULAR:  Denies chest pain or palpitations  GASTROINTESTINAL:  Appetite good, denies nausea, vomiting, or indigestion  Bowel movements normal occurring about once daily or every other day  URINARY:  Denies frequency, urgency, incontinence, dysuria, hematuria, nocturia, or recent flank pain  GENITAL:  Denies vaginal discharge, pelvic infection, lesions, ulcers, or pain  Negative dyspareunia or abnormal vaginal bleeding  PERIPHERAL VASCULAR:  Denies varicosities, skin changes, Continues with chronic left leg pain with intermittent swelling occurring  MUSCULOSKELETAL:  Denies back, joint, or muscle pain  Continues with chronic left posterior knee pain  Negative problems with mobility or movement  PSYCHIATRIC:  Denies problems with depression, anxiety, anger, or other psychiatric symptoms  NEUROLOGIC:  Denies fainting, dizziness, memory problems, seizures, tingling, motor or sensory loss  HEMATOLOGIC:  Denies easy bruising, bleeding, or anemia  ENDOCRINE:  Denies thyroid problems, temperature intolerance, excessive sweating, or other endocrine symptoms  Objective:     Physical Exam  Vitals and nursing note reviewed  GENERAL:  Appears well nourished, well groomed, in no acute distress  SKIN:  Palms warm, dry, color good  Nails without clubbing or cyanosis  No lesions, ulcerations, or wounds  HEAD:  Hair is average texture  Scalp without lesions, normocephalic, and atraumatic  EYES:  Visual fields full by confrontation  Conjunctiva pink, sclera white  EARS:  B/L ear canals clear  B/L TMs clear with + light reflex  NOSE: Mucosa pink, moist, septum midline  Negative sinus tenderness  B/L turbinates pink, moist, non-edematous without exudate  MOUTH:  Oral mucosa pink    Pharynx pink, moist, without swelling, redness, or exudate  Dentition ok  Tongue midline  NECK:  Supple, trachea midline, Negative thyromegaly, lymphadenopathy, or swollen glands  LYMPH NODES:  Negative enlargement of neck, axillary, epitrochlear, or inguinal nodes  THORAX/LUNGS  Thorax symmetric with good excursion  Lungs resonant  Breath sounds vesicular with no added sounds  Diaphragm descends within normal limits  CARDIOVASCULAR:  Carotid upstrokes brisk and without bruits  Apical impulse discrete and tapping, barely palpable in the 5th ICS/MCL  Normal S1 and Normal S2, Negative S3 or S4  Negative murmurs, thrills, lifts, or heaves  ABDOMEN:  Protuberant, bowel sounds normal active x 4 quadrants  Negative tenderness  Negative masses  Negative hepatomegaly  Negative splenomegaly  Negative costovertebral tenderness  EXTREMITIES:  Warm, calves supple, non-tender, negative for edema  Negative stasis pigmentation or ulcers  +2 pulses throughout  MUSCULOSKELETAL:  Negative joint deformities  Good range of motion in hands, wrists, elbows, shoulders, spine, hips, knees, and ankles  NEUROLOGICAL:  Mental status:  Awake, alert, and oriented to person, place, time, and event  Normal thought processes

## 2021-07-19 NOTE — PATIENT INSTRUCTIONS
Wellness Visit for Adults   WHAT YOU NEED TO KNOW:   What is a wellness visit? A wellness visit is when you see your healthcare provider to get screened for health problems  Your healthcare provider will also give you advice on how to stay healthy  Write down your questions so you remember to ask them  Ask your healthcare provider how often you should have a wellness visit  What happens at a wellness visit? Your healthcare provider will ask about your health, and your family history of health problems  This includes high blood pressure, heart disease, and cancer  He or she will ask if you have symptoms that concern you, if you smoke, and about your mood  You may also be asked about your intake of medicines, supplements, food, and alcohol  Any of the following may be done:  · Your weight  will be checked  Your height may also be checked so your body mass index (BMI) can be calculated  Your BMI shows if you are at a healthy weight  · Your blood pressure  and heart rate will be checked  Your temperature may also be checked  · Blood and urine tests  may be done  Blood tests may be done to check your cholesterol levels  Abnormal cholesterol levels increase your risk for heart disease and stroke  You may also need a blood or urine test to check for diabetes if you are at increased risk  Urine tests may be done to look for signs of an infection or kidney disease  · A physical exam  includes checking your heartbeat and lungs with a stethoscope  Your healthcare provider may also check your skin to look for sun damage  · Screening tests  may be recommended  A screening test is done to check for diseases that may not cause symptoms  The screening tests you may need depend on your age, gender, family history, and lifestyle habits  For example, colorectal screening may be recommended if you are 48years old or older  What screening tests do I need if I am a woman?    · A Pap smear  is used to screen for cervical cancer  Pap smears are usually done every 3 to 5 years depending on your age  You may need them more often if you have had abnormal Pap smear test results in the past  Ask your healthcare provider how often you should have a Pap smear  · A mammogram  is an x-ray of your breasts to screen for breast cancer  Experts recommend mammograms every 2 years starting at age 48 years  You may need a mammogram at age 52 years or younger if you have an increased risk for breast cancer  Talk to your healthcare provider about when you should start having mammograms and how often you need them  What vaccines might I need? · Get an influenza vaccine  every year  The influenza vaccine protects you from the flu  Several types of viruses cause the flu  The viruses change over time, so new vaccines are made each year  · Get a tetanus-diphtheria (Td) booster vaccine  every 10 years  This vaccine protects you against tetanus and diphtheria  Tetanus is a severe infection that may cause painful muscle spasms and lockjaw  Diphtheria is a severe bacterial infection that causes a thick covering in the back of your mouth and throat  · Get a human papillomavirus (HPV) vaccine  if you are female and aged 23 to 32 or male 23 to 24 and never received it  This vaccine protects you from HPV infection  HPV is the most common infection spread by sexual contact  HPV may also cause vaginal, penile, and anal cancers  · Get a pneumococcal vaccine  if you are aged 72 years or older  The pneumococcal vaccine is an injection given to protect you from pneumococcal disease  Pneumococcal disease is an infection caused by pneumococcal bacteria  The infection may cause pneumonia, meningitis, or an ear infection  · Get a shingles vaccine  if you are 60 or older, even if you have had shingles before  The shingles vaccine is an injection to protect you from the varicella-zoster virus  This is the same virus that causes chickenpox   Shingles is a painful rash that develops in people who had chickenpox or have been exposed to the virus  How can I eat healthy? My Plate is a model for planning healthy meals  It shows the types and amounts of foods that should go on your plate  Fruits and vegetables make up about half of your plate, and grains and protein make up the other half  A serving of dairy is included on the side of your plate  The amount of calories and serving sizes you need depends on your age, gender, weight, and height  Examples of healthy foods are listed below:  · Eat a variety of vegetables  such as dark green, red, and orange vegetables  You can also include canned vegetables low in sodium (salt) and frozen vegetables without added butter or sauces  · Eat a variety of fresh fruits , canned fruit in 100% juice, frozen fruit, and dried fruit  · Include whole grains  At least half of the grains you eat should be whole grains  Examples include whole-wheat bread, wheat pasta, brown rice, and whole-grain cereals such as oatmeal     · Eat a variety of protein foods such as seafood (fish and shellfish), lean meat, and poultry without skin (turkey and chicken)  Examples of lean meats include pork leg, shoulder, or tenderloin, and beef round, sirloin, tenderloin, and extra lean ground beef  Other protein foods include eggs and egg substitutes, beans, peas, soy products, nuts, and seeds  · Choose low-fat dairy products such as skim or 1% milk or low-fat yogurt, cheese, and cottage cheese  · Limit unhealthy fats  such as butter, hard margarine, and shortening  How much exercise do I need? Exercise at least 30 minutes per day on most days of the week  Some examples of exercise include walking, biking, dancing, and swimming  You can also fit in more physical activity by taking the stairs instead of the elevator or parking farther away from stores  Include muscle strengthening activities 2 days each week   Regular exercise provides many health benefits  It helps you manage your weight, and decreases your risk for type 2 diabetes, heart disease, stroke, and high blood pressure  Exercise can also help improve your mood  Ask your healthcare provider about the best exercise plan for you  What are some general health and safety guidelines I should follow? · Do not smoke  Nicotine and other chemicals in cigarettes and cigars can cause lung damage  Ask your healthcare provider for information if you currently smoke and need help to quit  E-cigarettes or smokeless tobacco still contain nicotine  Talk to your healthcare provider before you use these products  · Limit alcohol  A drink of alcohol is 12 ounces of beer, 5 ounces of wine, or 1½ ounces of liquor  · Lose weight, if needed  Being overweight increases your risk of certain health conditions  These include heart disease, high blood pressure, type 2 diabetes, and certain types of cancer  · Protect your skin  Do not sunbathe or use tanning beds  Use sunscreen with a SPF 15 or higher  Apply sunscreen at least 15 minutes before you go outside  Reapply sunscreen every 2 hours  Wear protective clothing, hats, and sunglasses when you are outside  · Drive safely  Always wear your seatbelt  Make sure everyone in your car wears a seatbelt  A seatbelt can save your life if you are in an accident  Do not use your cell phone when you are driving  This could distract you and cause an accident  Pull over if you need to make a call or send a text message  · Practice safe sex  Use latex condoms if are sexually active and have more than one partner  Your healthcare provider may recommend screening tests for sexually transmitted infections (STIs)  · Wear helmets, lifejackets, and protective gear  Always wear a helmet when you ride a bike or motorcycle, go skiing, or play sports that could cause a head injury  Wear protective equipment when you play sports   Wear a lifejacket when you are on a boat or doing water sports  CARE AGREEMENT:   You have the right to help plan your care  Learn about your health condition and how it may be treated  Discuss treatment options with your healthcare providers to decide what care you want to receive  You always have the right to refuse treatment  The above information is an  only  It is not intended as medical advice for individual conditions or treatments  Talk to your doctor, nurse or pharmacist before following any medical regimen to see if it is safe and effective for you  © Copyright [address] Information is for End User's use only and may not be sold, redistributed or otherwise used for commercial purposes   All illustrations and images included in CareNotes® are the copyrighted property of [employer]  or [address] [place] [address]

## 2021-08-23 ENCOUNTER — TELEPHONE (OUTPATIENT)
Dept: CARDIOLOGY CLINIC | Facility: HOSPITAL | Age: 66
End: 2021-08-23

## 2021-08-23 NOTE — TELEPHONE ENCOUNTER
Attempted to contact Josef Wahl to schedule her next appointment with cardiology  A letter has been sent to have her contact our office

## 2021-10-01 ENCOUNTER — APPOINTMENT (OUTPATIENT)
Dept: LAB | Facility: MEDICAL CENTER | Age: 66
End: 2021-10-01
Payer: COMMERCIAL

## 2021-10-01 DIAGNOSIS — R73.9 HYPERGLYCEMIA: ICD-10-CM

## 2021-10-01 DIAGNOSIS — E55.9 VITAMIN D DEFICIENCY: ICD-10-CM

## 2021-10-01 DIAGNOSIS — E53.8 VITAMIN B12 DEFICIENCY: ICD-10-CM

## 2021-10-01 DIAGNOSIS — Z11.4 ENCOUNTER FOR SCREENING FOR HIV: ICD-10-CM

## 2021-10-01 DIAGNOSIS — E07.9 THYROID DISORDER: ICD-10-CM

## 2021-10-01 DIAGNOSIS — Z11.59 NEED FOR HEPATITIS C SCREENING TEST: ICD-10-CM

## 2021-10-01 DIAGNOSIS — D64.9 ANEMIA, UNSPECIFIED TYPE: ICD-10-CM

## 2021-10-01 DIAGNOSIS — E78.49 OTHER HYPERLIPIDEMIA: ICD-10-CM

## 2021-10-01 LAB
25(OH)D3 SERPL-MCNC: 45.8 NG/ML (ref 30–100)
ALBUMIN SERPL BCP-MCNC: 3.9 G/DL (ref 3.5–5)
ALP SERPL-CCNC: 77 U/L (ref 46–116)
ALT SERPL W P-5'-P-CCNC: 37 U/L (ref 12–78)
ANION GAP SERPL CALCULATED.3IONS-SCNC: 4 MMOL/L (ref 4–13)
AST SERPL W P-5'-P-CCNC: 19 U/L (ref 5–45)
BASOPHILS # BLD AUTO: 0.12 THOUSANDS/ΜL (ref 0–0.1)
BASOPHILS NFR BLD AUTO: 1 % (ref 0–1)
BILIRUB SERPL-MCNC: 0.56 MG/DL (ref 0.2–1)
BUN SERPL-MCNC: 18 MG/DL (ref 5–25)
CALCIUM SERPL-MCNC: 10.1 MG/DL (ref 8.3–10.1)
CHLORIDE SERPL-SCNC: 98 MMOL/L (ref 100–108)
CHOLEST SERPL-MCNC: 183 MG/DL (ref 50–200)
CO2 SERPL-SCNC: 31 MMOL/L (ref 21–32)
CREAT SERPL-MCNC: 1 MG/DL (ref 0.6–1.3)
EOSINOPHIL # BLD AUTO: 0.43 THOUSAND/ΜL (ref 0–0.61)
EOSINOPHIL NFR BLD AUTO: 4 % (ref 0–6)
ERYTHROCYTE [DISTWIDTH] IN BLOOD BY AUTOMATED COUNT: 14.3 % (ref 11.6–15.1)
EST. AVERAGE GLUCOSE BLD GHB EST-MCNC: 114 MG/DL
GFR SERPL CREATININE-BSD FRML MDRD: 58 ML/MIN/1.73SQ M
GLUCOSE P FAST SERPL-MCNC: 98 MG/DL (ref 65–99)
HBA1C MFR BLD: 5.6 %
HCT VFR BLD AUTO: 45.7 % (ref 34.8–46.1)
HCV AB SER QL: NORMAL
HDLC SERPL-MCNC: 47 MG/DL
HGB BLD-MCNC: 15.1 G/DL (ref 11.5–15.4)
IMM GRANULOCYTES # BLD AUTO: 0.04 THOUSAND/UL (ref 0–0.2)
IMM GRANULOCYTES NFR BLD AUTO: 0 % (ref 0–2)
INSULIN SERPL-ACNC: 22.8 MU/L (ref 3–25)
LDLC SERPL CALC-MCNC: 106 MG/DL (ref 0–100)
LYMPHOCYTES # BLD AUTO: 3.51 THOUSANDS/ΜL (ref 0.6–4.47)
LYMPHOCYTES NFR BLD AUTO: 34 % (ref 14–44)
MCH RBC QN AUTO: 32.1 PG (ref 26.8–34.3)
MCHC RBC AUTO-ENTMCNC: 33 G/DL (ref 31.4–37.4)
MCV RBC AUTO: 97 FL (ref 82–98)
MONOCYTES # BLD AUTO: 0.72 THOUSAND/ΜL (ref 0.17–1.22)
MONOCYTES NFR BLD AUTO: 7 % (ref 4–12)
NEUTROPHILS # BLD AUTO: 5.67 THOUSANDS/ΜL (ref 1.85–7.62)
NEUTS SEG NFR BLD AUTO: 54 % (ref 43–75)
NONHDLC SERPL-MCNC: 136 MG/DL
NRBC BLD AUTO-RTO: 0 /100 WBCS
PLATELET # BLD AUTO: 242 THOUSANDS/UL (ref 149–390)
PMV BLD AUTO: 11.5 FL (ref 8.9–12.7)
POTASSIUM SERPL-SCNC: 3.9 MMOL/L (ref 3.5–5.3)
PROT SERPL-MCNC: 8.2 G/DL (ref 6.4–8.2)
RBC # BLD AUTO: 4.7 MILLION/UL (ref 3.81–5.12)
SODIUM SERPL-SCNC: 133 MMOL/L (ref 136–145)
TRIGL SERPL-MCNC: 150 MG/DL
TSH SERPL DL<=0.05 MIU/L-ACNC: 4.1 UIU/ML (ref 0.36–3.74)
VIT B12 SERPL-MCNC: 702 PG/ML (ref 100–900)
WBC # BLD AUTO: 10.49 THOUSAND/UL (ref 4.31–10.16)

## 2021-10-01 PROCEDURE — 83036 HEMOGLOBIN GLYCOSYLATED A1C: CPT

## 2021-10-01 PROCEDURE — 86803 HEPATITIS C AB TEST: CPT

## 2021-10-01 PROCEDURE — 82607 VITAMIN B-12: CPT

## 2021-10-01 PROCEDURE — 36415 COLL VENOUS BLD VENIPUNCTURE: CPT

## 2021-10-01 PROCEDURE — 83525 ASSAY OF INSULIN: CPT

## 2021-10-01 PROCEDURE — 82306 VITAMIN D 25 HYDROXY: CPT

## 2021-10-01 PROCEDURE — 80053 COMPREHEN METABOLIC PANEL: CPT

## 2021-10-01 PROCEDURE — 84443 ASSAY THYROID STIM HORMONE: CPT

## 2021-10-01 PROCEDURE — 87389 HIV-1 AG W/HIV-1&-2 AB AG IA: CPT

## 2021-10-01 PROCEDURE — 80061 LIPID PANEL: CPT

## 2021-10-01 PROCEDURE — 85025 COMPLETE CBC W/AUTO DIFF WBC: CPT

## 2021-10-03 LAB — HIV 1+2 AB+HIV1 P24 AG SERPL QL IA: NORMAL

## 2021-10-11 ENCOUNTER — CONSULT (OUTPATIENT)
Dept: VASCULAR SURGERY | Facility: HOSPITAL | Age: 66
End: 2021-10-11
Payer: COMMERCIAL

## 2021-10-11 VITALS
WEIGHT: 270 LBS | HEIGHT: 68 IN | HEART RATE: 63 BPM | SYSTOLIC BLOOD PRESSURE: 158 MMHG | TEMPERATURE: 97.5 F | BODY MASS INDEX: 40.92 KG/M2 | DIASTOLIC BLOOD PRESSURE: 88 MMHG

## 2021-10-11 DIAGNOSIS — M25.562 ACUTE PAIN OF LEFT KNEE: ICD-10-CM

## 2021-10-11 DIAGNOSIS — I82.532 CHRONIC DEEP VEIN THROMBOSIS (DVT) OF POPLITEAL VEIN OF LEFT LOWER EXTREMITY (HCC): ICD-10-CM

## 2021-10-11 PROBLEM — I82.509 CHRONIC DEEP VEIN THROMBOSIS (DVT) (HCC): Status: ACTIVE | Noted: 2021-10-11

## 2021-10-11 PROCEDURE — 99243 OFF/OP CNSLTJ NEW/EST LOW 30: CPT | Performed by: SURGERY

## 2021-10-18 ENCOUNTER — OFFICE VISIT (OUTPATIENT)
Dept: FAMILY MEDICINE CLINIC | Facility: CLINIC | Age: 66
End: 2021-10-18
Payer: COMMERCIAL

## 2021-10-18 VITALS
WEIGHT: 271.6 LBS | DIASTOLIC BLOOD PRESSURE: 70 MMHG | HEART RATE: 68 BPM | RESPIRATION RATE: 18 BRPM | HEIGHT: 68 IN | SYSTOLIC BLOOD PRESSURE: 102 MMHG | TEMPERATURE: 98.5 F | BODY MASS INDEX: 41.16 KG/M2 | OXYGEN SATURATION: 99 %

## 2021-10-18 DIAGNOSIS — E78.00 PURE HYPERCHOLESTEROLEMIA: Primary | ICD-10-CM

## 2021-10-18 DIAGNOSIS — E78.49 OTHER HYPERLIPIDEMIA: ICD-10-CM

## 2021-10-18 DIAGNOSIS — E03.9 ACQUIRED HYPOTHYROIDISM: ICD-10-CM

## 2021-10-18 DIAGNOSIS — M79.641 BILATERAL HAND PAIN: ICD-10-CM

## 2021-10-18 DIAGNOSIS — K21.00 GASTROESOPHAGEAL REFLUX DISEASE WITH ESOPHAGITIS WITHOUT HEMORRHAGE: ICD-10-CM

## 2021-10-18 DIAGNOSIS — K21.9 GASTROESOPHAGEAL REFLUX DISEASE: ICD-10-CM

## 2021-10-18 DIAGNOSIS — J34.89 NASAL SORE: ICD-10-CM

## 2021-10-18 DIAGNOSIS — Z23 NEEDS FLU SHOT: ICD-10-CM

## 2021-10-18 DIAGNOSIS — R22.43 LOCALIZED SWELLING OF BOTH LOWER LEGS: ICD-10-CM

## 2021-10-18 DIAGNOSIS — R73.9 HYPERGLYCEMIA: ICD-10-CM

## 2021-10-18 DIAGNOSIS — E53.8 VITAMIN B12 DEFICIENCY: ICD-10-CM

## 2021-10-18 DIAGNOSIS — I10 ESSENTIAL HYPERTENSION: ICD-10-CM

## 2021-10-18 DIAGNOSIS — E55.9 VITAMIN D DEFICIENCY: ICD-10-CM

## 2021-10-18 DIAGNOSIS — M79.642 BILATERAL HAND PAIN: ICD-10-CM

## 2021-10-18 DIAGNOSIS — D64.9 ANEMIA, UNSPECIFIED TYPE: ICD-10-CM

## 2021-10-18 DIAGNOSIS — J30.89 SEASONAL ALLERGIC RHINITIS DUE TO OTHER ALLERGIC TRIGGER: ICD-10-CM

## 2021-10-18 DIAGNOSIS — M25.552 LEFT HIP PAIN: ICD-10-CM

## 2021-10-18 PROCEDURE — 3008F BODY MASS INDEX DOCD: CPT | Performed by: NURSE PRACTITIONER

## 2021-10-18 PROCEDURE — 1160F RVW MEDS BY RX/DR IN RCRD: CPT | Performed by: NURSE PRACTITIONER

## 2021-10-18 PROCEDURE — 99214 OFFICE O/P EST MOD 30 MIN: CPT | Performed by: NURSE PRACTITIONER

## 2021-10-18 PROCEDURE — 1036F TOBACCO NON-USER: CPT | Performed by: NURSE PRACTITIONER

## 2021-10-18 PROCEDURE — 3725F SCREEN DEPRESSION PERFORMED: CPT | Performed by: NURSE PRACTITIONER

## 2021-10-18 RX ORDER — TRIAMTERENE AND HYDROCHLOROTHIAZIDE 37.5; 25 MG/1; MG/1
1 CAPSULE ORAL EVERY MORNING
Qty: 90 CAPSULE | Refills: 1 | Status: SHIPPED | OUTPATIENT
Start: 2021-10-18 | End: 2022-04-18 | Stop reason: SDUPTHER

## 2021-10-18 RX ORDER — LORATADINE 10 MG/1
TABLET ORAL
Qty: 30 TABLET | Refills: 3 | Status: SHIPPED | OUTPATIENT
Start: 2021-10-18

## 2021-10-18 RX ORDER — ATORVASTATIN CALCIUM 40 MG/1
40 TABLET, FILM COATED ORAL DAILY
Qty: 90 TABLET | Refills: 3 | Status: SHIPPED | OUTPATIENT
Start: 2021-10-18 | End: 2022-04-18 | Stop reason: SDUPTHER

## 2021-10-27 ENCOUNTER — APPOINTMENT (OUTPATIENT)
Dept: RADIOLOGY | Facility: CLINIC | Age: 66
End: 2021-10-27
Payer: COMMERCIAL

## 2021-10-27 DIAGNOSIS — M79.642 BILATERAL HAND PAIN: ICD-10-CM

## 2021-10-27 DIAGNOSIS — M79.641 BILATERAL HAND PAIN: ICD-10-CM

## 2021-10-27 PROCEDURE — 73130 X-RAY EXAM OF HAND: CPT

## 2021-12-15 ENCOUNTER — APPOINTMENT (OUTPATIENT)
Dept: LAB | Facility: CLINIC | Age: 66
End: 2021-12-15
Payer: COMMERCIAL

## 2021-12-15 DIAGNOSIS — E03.9 ACQUIRED HYPOTHYROIDISM: ICD-10-CM

## 2021-12-15 LAB — TSH SERPL DL<=0.05 MIU/L-ACNC: 3.14 UIU/ML (ref 0.36–3.74)

## 2021-12-15 PROCEDURE — 36415 COLL VENOUS BLD VENIPUNCTURE: CPT

## 2021-12-15 PROCEDURE — 84443 ASSAY THYROID STIM HORMONE: CPT

## 2021-12-22 ENCOUNTER — TELEPHONE (OUTPATIENT)
Dept: FAMILY MEDICINE CLINIC | Facility: CLINIC | Age: 66
End: 2021-12-22

## 2022-04-01 ENCOUNTER — APPOINTMENT (OUTPATIENT)
Dept: LAB | Facility: CLINIC | Age: 67
End: 2022-04-01
Payer: COMMERCIAL

## 2022-04-01 DIAGNOSIS — D64.9 ANEMIA, UNSPECIFIED TYPE: ICD-10-CM

## 2022-04-01 DIAGNOSIS — E55.9 VITAMIN D DEFICIENCY: ICD-10-CM

## 2022-04-01 DIAGNOSIS — E03.9 ACQUIRED HYPOTHYROIDISM: ICD-10-CM

## 2022-04-01 DIAGNOSIS — E53.8 VITAMIN B12 DEFICIENCY: ICD-10-CM

## 2022-04-01 DIAGNOSIS — I10 ESSENTIAL HYPERTENSION: ICD-10-CM

## 2022-04-01 DIAGNOSIS — E78.00 PURE HYPERCHOLESTEROLEMIA: ICD-10-CM

## 2022-04-01 DIAGNOSIS — R73.9 HYPERGLYCEMIA: ICD-10-CM

## 2022-04-01 LAB
25(OH)D3 SERPL-MCNC: 34.7 NG/ML (ref 30–100)
ALBUMIN SERPL BCP-MCNC: 4 G/DL (ref 3.5–5)
ALP SERPL-CCNC: 73 U/L (ref 46–116)
ALT SERPL W P-5'-P-CCNC: 38 U/L (ref 12–78)
ANION GAP SERPL CALCULATED.3IONS-SCNC: 6 MMOL/L (ref 4–13)
AST SERPL W P-5'-P-CCNC: 22 U/L (ref 5–45)
BASOPHILS # BLD AUTO: 0.08 THOUSANDS/ΜL (ref 0–0.1)
BASOPHILS NFR BLD AUTO: 1 % (ref 0–1)
BILIRUB SERPL-MCNC: 0.6 MG/DL (ref 0.2–1)
BUN SERPL-MCNC: 25 MG/DL (ref 5–25)
CALCIUM SERPL-MCNC: 9.8 MG/DL (ref 8.3–10.1)
CHLORIDE SERPL-SCNC: 104 MMOL/L (ref 100–108)
CHOLEST SERPL-MCNC: 191 MG/DL
CO2 SERPL-SCNC: 28 MMOL/L (ref 21–32)
CREAT SERPL-MCNC: 1.08 MG/DL (ref 0.6–1.3)
EOSINOPHIL # BLD AUTO: 0.3 THOUSAND/ΜL (ref 0–0.61)
EOSINOPHIL NFR BLD AUTO: 3 % (ref 0–6)
ERYTHROCYTE [DISTWIDTH] IN BLOOD BY AUTOMATED COUNT: 14.3 % (ref 11.6–15.1)
EST. AVERAGE GLUCOSE BLD GHB EST-MCNC: 123 MG/DL
GFR SERPL CREATININE-BSD FRML MDRD: 52 ML/MIN/1.73SQ M
GLUCOSE P FAST SERPL-MCNC: 94 MG/DL (ref 65–99)
HBA1C MFR BLD: 5.9 %
HCT VFR BLD AUTO: 46.4 % (ref 34.8–46.1)
HDLC SERPL-MCNC: 53 MG/DL
HGB BLD-MCNC: 14.5 G/DL (ref 11.5–15.4)
IMM GRANULOCYTES # BLD AUTO: 0.04 THOUSAND/UL (ref 0–0.2)
IMM GRANULOCYTES NFR BLD AUTO: 0 % (ref 0–2)
INSULIN SERPL-ACNC: 23.2 MU/L (ref 3–25)
LDLC SERPL CALC-MCNC: 102 MG/DL (ref 0–100)
LYMPHOCYTES # BLD AUTO: 3.22 THOUSANDS/ΜL (ref 0.6–4.47)
LYMPHOCYTES NFR BLD AUTO: 35 % (ref 14–44)
MCH RBC QN AUTO: 30.7 PG (ref 26.8–34.3)
MCHC RBC AUTO-ENTMCNC: 31.3 G/DL (ref 31.4–37.4)
MCV RBC AUTO: 98 FL (ref 82–98)
MONOCYTES # BLD AUTO: 0.57 THOUSAND/ΜL (ref 0.17–1.22)
MONOCYTES NFR BLD AUTO: 6 % (ref 4–12)
NEUTROPHILS # BLD AUTO: 4.93 THOUSANDS/ΜL (ref 1.85–7.62)
NEUTS SEG NFR BLD AUTO: 55 % (ref 43–75)
NONHDLC SERPL-MCNC: 138 MG/DL
NRBC BLD AUTO-RTO: 0 /100 WBCS
PLATELET # BLD AUTO: 227 THOUSANDS/UL (ref 149–390)
PMV BLD AUTO: 11.3 FL (ref 8.9–12.7)
POTASSIUM SERPL-SCNC: 4.3 MMOL/L (ref 3.5–5.3)
PROT SERPL-MCNC: 7.9 G/DL (ref 6.4–8.2)
RBC # BLD AUTO: 4.73 MILLION/UL (ref 3.81–5.12)
SODIUM SERPL-SCNC: 138 MMOL/L (ref 136–145)
TRIGL SERPL-MCNC: 181 MG/DL
TSH SERPL DL<=0.05 MIU/L-ACNC: 4.2 UIU/ML (ref 0.36–3.74)
VIT B12 SERPL-MCNC: 641 PG/ML (ref 100–900)
WBC # BLD AUTO: 9.14 THOUSAND/UL (ref 4.31–10.16)

## 2022-04-01 PROCEDURE — 82306 VITAMIN D 25 HYDROXY: CPT

## 2022-04-01 PROCEDURE — 80053 COMPREHEN METABOLIC PANEL: CPT

## 2022-04-01 PROCEDURE — 83036 HEMOGLOBIN GLYCOSYLATED A1C: CPT

## 2022-04-01 PROCEDURE — 36415 COLL VENOUS BLD VENIPUNCTURE: CPT

## 2022-04-01 PROCEDURE — 82607 VITAMIN B-12: CPT

## 2022-04-01 PROCEDURE — 83525 ASSAY OF INSULIN: CPT

## 2022-04-01 PROCEDURE — 84443 ASSAY THYROID STIM HORMONE: CPT

## 2022-04-01 PROCEDURE — 85025 COMPLETE CBC W/AUTO DIFF WBC: CPT

## 2022-04-01 PROCEDURE — 80061 LIPID PANEL: CPT

## 2022-04-04 DIAGNOSIS — E03.9 ACQUIRED HYPOTHYROIDISM: ICD-10-CM

## 2022-04-04 RX ORDER — LEVOTHYROXINE SODIUM 0.12 MG/1
125 TABLET ORAL
Qty: 90 TABLET | Refills: 3 | Status: SHIPPED | OUTPATIENT
Start: 2022-04-04

## 2022-04-18 ENCOUNTER — OFFICE VISIT (OUTPATIENT)
Dept: FAMILY MEDICINE CLINIC | Facility: CLINIC | Age: 67
End: 2022-04-18
Payer: COMMERCIAL

## 2022-04-18 VITALS
RESPIRATION RATE: 18 BRPM | HEART RATE: 76 BPM | DIASTOLIC BLOOD PRESSURE: 80 MMHG | BODY MASS INDEX: 41.95 KG/M2 | TEMPERATURE: 98.8 F | HEIGHT: 68 IN | WEIGHT: 276.8 LBS | OXYGEN SATURATION: 92 % | SYSTOLIC BLOOD PRESSURE: 140 MMHG

## 2022-04-18 DIAGNOSIS — R22.1 LUMP IN NECK: ICD-10-CM

## 2022-04-18 DIAGNOSIS — E03.9 ACQUIRED HYPOTHYROIDISM: ICD-10-CM

## 2022-04-18 DIAGNOSIS — N28.9 RENAL INSUFFICIENCY: ICD-10-CM

## 2022-04-18 DIAGNOSIS — M25.552 LEFT HIP PAIN: ICD-10-CM

## 2022-04-18 DIAGNOSIS — Z23 ENCOUNTER FOR IMMUNIZATION: ICD-10-CM

## 2022-04-18 DIAGNOSIS — D64.9 ANEMIA, UNSPECIFIED TYPE: ICD-10-CM

## 2022-04-18 DIAGNOSIS — E55.9 VITAMIN D DEFICIENCY: ICD-10-CM

## 2022-04-18 DIAGNOSIS — Z00.00 ANNUAL PHYSICAL EXAM: Primary | ICD-10-CM

## 2022-04-18 DIAGNOSIS — Z00.00 ENCOUNTER FOR ANNUAL PHYSICAL EXAM: ICD-10-CM

## 2022-04-18 DIAGNOSIS — K21.00 GASTROESOPHAGEAL REFLUX DISEASE WITH ESOPHAGITIS WITHOUT HEMORRHAGE: ICD-10-CM

## 2022-04-18 DIAGNOSIS — E78.00 PURE HYPERCHOLESTEROLEMIA: ICD-10-CM

## 2022-04-18 DIAGNOSIS — R73.9 HYPERGLYCEMIA: ICD-10-CM

## 2022-04-18 DIAGNOSIS — J30.2 SEASONAL ALLERGIC RHINITIS, UNSPECIFIED TRIGGER: ICD-10-CM

## 2022-04-18 DIAGNOSIS — B37.2 CANDIDIASIS OF SKIN: ICD-10-CM

## 2022-04-18 DIAGNOSIS — E78.49 OTHER HYPERLIPIDEMIA: ICD-10-CM

## 2022-04-18 DIAGNOSIS — I10 ESSENTIAL HYPERTENSION: ICD-10-CM

## 2022-04-18 DIAGNOSIS — E53.8 VITAMIN B12 DEFICIENCY: ICD-10-CM

## 2022-04-18 DIAGNOSIS — Z12.31 ENCOUNTER FOR SCREENING MAMMOGRAM FOR BREAST CANCER: ICD-10-CM

## 2022-04-18 DIAGNOSIS — I82.532 CHRONIC DEEP VEIN THROMBOSIS (DVT) OF POPLITEAL VEIN OF LEFT LOWER EXTREMITY (HCC): ICD-10-CM

## 2022-04-18 DIAGNOSIS — R22.43 LOCALIZED SWELLING OF BOTH LOWER LEGS: ICD-10-CM

## 2022-04-18 PROCEDURE — 1036F TOBACCO NON-USER: CPT | Performed by: NURSE PRACTITIONER

## 2022-04-18 PROCEDURE — 1101F PT FALLS ASSESS-DOCD LE1/YR: CPT | Performed by: NURSE PRACTITIONER

## 2022-04-18 PROCEDURE — 1160F RVW MEDS BY RX/DR IN RCRD: CPT | Performed by: NURSE PRACTITIONER

## 2022-04-18 PROCEDURE — 3725F SCREEN DEPRESSION PERFORMED: CPT | Performed by: NURSE PRACTITIONER

## 2022-04-18 PROCEDURE — 99397 PER PM REEVAL EST PAT 65+ YR: CPT | Performed by: NURSE PRACTITIONER

## 2022-04-18 PROCEDURE — 3008F BODY MASS INDEX DOCD: CPT | Performed by: NURSE PRACTITIONER

## 2022-04-18 PROCEDURE — 3288F FALL RISK ASSESSMENT DOCD: CPT | Performed by: NURSE PRACTITIONER

## 2022-04-18 RX ORDER — ATORVASTATIN CALCIUM 40 MG/1
40 TABLET, FILM COATED ORAL DAILY
Qty: 90 TABLET | Refills: 3 | Status: SHIPPED | OUTPATIENT
Start: 2022-04-18

## 2022-04-18 RX ORDER — KETOCONAZOLE 20 MG/G
CREAM TOPICAL 2 TIMES DAILY PRN
Qty: 60 G | Refills: 5 | Status: SHIPPED | OUTPATIENT
Start: 2022-04-18

## 2022-04-18 RX ORDER — FLUTICASONE PROPIONATE 50 MCG
SPRAY, SUSPENSION (ML) NASAL
Qty: 18.2 ML | Refills: 5 | Status: SHIPPED | OUTPATIENT
Start: 2022-04-18

## 2022-04-18 RX ORDER — TRIAMTERENE AND HYDROCHLOROTHIAZIDE 37.5; 25 MG/1; MG/1
1 CAPSULE ORAL EVERY MORNING
Qty: 90 CAPSULE | Refills: 1 | Status: SHIPPED | OUTPATIENT
Start: 2022-04-18

## 2022-04-18 RX ORDER — MELATONIN
2000 DAILY
Qty: 90 TABLET | Refills: 1
Start: 2022-04-18

## 2022-04-18 NOTE — PATIENT INSTRUCTIONS
Wellness Visit for Adults   AMBULATORY CARE:   A wellness visit  is when you see your healthcare provider to get screened for health problems  Your healthcare provider will also give you advice on how to stay healthy  Write down your questions so you remember to ask them  Ask your healthcare provider how often you should have a wellness visit  What happens at a wellness visit:  Your healthcare provider will ask about your health, and your family history of health problems  This includes high blood pressure, heart disease, and cancer  He or she will ask if you have symptoms that concern you, if you smoke, and about your mood  You may also be asked about your intake of medicines, supplements, food, and alcohol  Any of the following may be done:  · Your weight  will be checked  Your height may also be checked so your body mass index (BMI) can be calculated  Your BMI shows if you are at a healthy weight  · Your blood pressure  and heart rate will be checked  Your temperature may also be checked  · Blood and urine tests  may be done  Blood tests may be done to check your cholesterol levels  Abnormal cholesterol levels increase your risk for heart disease and stroke  You may also need a blood or urine test to check for diabetes if you are at increased risk  Urine tests may be done to look for signs of an infection or kidney disease  · A physical exam  includes checking your heartbeat and lungs with a stethoscope  Your healthcare provider may also check your skin to look for sun damage  · Screening tests  may be recommended  A screening test is done to check for diseases that may not cause symptoms  The screening tests you may need depend on your age, gender, family history, and lifestyle habits  For example, colorectal screening may be recommended if you are 48years old or older  Screening tests you need if you are a woman:   · A Pap smear  is used to screen for cervical cancer   Pap smears are usually done every 3 to 5 years depending on your age  You may need them more often if you have had abnormal Pap smear test results in the past  Ask your healthcare provider how often you should have a Pap smear  · A mammogram  is an x-ray of your breasts to screen for breast cancer  Experts recommend mammograms every 2 years starting at age 48 years  You may need a mammogram at age 52 years or younger if you have an increased risk for breast cancer  Talk to your healthcare provider about when you should start having mammograms and how often you need them  Vaccines you may need:   · Get an influenza vaccine  every year  The influenza vaccine protects you from the flu  Several types of viruses cause the flu  The viruses change over time, so new vaccines are made each year  · Get a tetanus-diphtheria (Td) booster vaccine  every 10 years  This vaccine protects you against tetanus and diphtheria  Tetanus is a severe infection that may cause painful muscle spasms and lockjaw  Diphtheria is a severe bacterial infection that causes a thick covering in the back of your mouth and throat  · Get a human papillomavirus (HPV) vaccine  if you are female and aged 23 to 32 or male 23 to 24 and never received it  This vaccine protects you from HPV infection  HPV is the most common infection spread by sexual contact  HPV may also cause vaginal, penile, and anal cancers  · Get a pneumococcal vaccine  if you are aged 72 years or older  The pneumococcal vaccine is an injection given to protect you from pneumococcal disease  Pneumococcal disease is an infection caused by pneumococcal bacteria  The infection may cause pneumonia, meningitis, or an ear infection  · Get a shingles vaccine  if you are 60 or older, even if you have had shingles before  The shingles vaccine is an injection to protect you from the varicella-zoster virus  This is the same virus that causes chickenpox   Shingles is a painful rash that develops in people who had chickenpox or have been exposed to the virus  How to eat healthy:  My Plate is a model for planning healthy meals  It shows the types and amounts of foods that should go on your plate  Fruits and vegetables make up about half of your plate, and grains and protein make up the other half  A serving of dairy is included on the side of your plate  The amount of calories and serving sizes you need depends on your age, gender, weight, and height  Examples of healthy foods are listed below:  · Eat a variety of vegetables  such as dark green, red, and orange vegetables  You can also include canned vegetables low in sodium (salt) and frozen vegetables without added butter or sauces  · Eat a variety of fresh fruits , canned fruit in 100% juice, frozen fruit, and dried fruit  · Include whole grains  At least half of the grains you eat should be whole grains  Examples include whole-wheat bread, wheat pasta, brown rice, and whole-grain cereals such as oatmeal     · Eat a variety of protein foods such as seafood (fish and shellfish), lean meat, and poultry without skin (turkey and chicken)  Examples of lean meats include pork leg, shoulder, or tenderloin, and beef round, sirloin, tenderloin, and extra lean ground beef  Other protein foods include eggs and egg substitutes, beans, peas, soy products, nuts, and seeds  · Choose low-fat dairy products such as skim or 1% milk or low-fat yogurt, cheese, and cottage cheese  · Limit unhealthy fats  such as butter, hard margarine, and shortening  Exercise:  Exercise at least 30 minutes per day on most days of the week  Some examples of exercise include walking, biking, dancing, and swimming  You can also fit in more physical activity by taking the stairs instead of the elevator or parking farther away from stores  Include muscle strengthening activities 2 days each week  Regular exercise provides many health benefits   It helps you manage your weight, and decreases your risk for type 2 diabetes, heart disease, stroke, and high blood pressure  Exercise can also help improve your mood  Ask your healthcare provider about the best exercise plan for you  General health and safety guidelines:   · Do not smoke  Nicotine and other chemicals in cigarettes and cigars can cause lung damage  Ask your healthcare provider for information if you currently smoke and need help to quit  E-cigarettes or smokeless tobacco still contain nicotine  Talk to your healthcare provider before you use these products  · Limit alcohol  A drink of alcohol is 12 ounces of beer, 5 ounces of wine, or 1½ ounces of liquor  · Lose weight, if needed  Being overweight increases your risk of certain health conditions  These include heart disease, high blood pressure, type 2 diabetes, and certain types of cancer  · Protect your skin  Do not sunbathe or use tanning beds  Use sunscreen with a SPF 15 or higher  Apply sunscreen at least 15 minutes before you go outside  Reapply sunscreen every 2 hours  Wear protective clothing, hats, and sunglasses when you are outside  · Drive safely  Always wear your seatbelt  Make sure everyone in your car wears a seatbelt  A seatbelt can save your life if you are in an accident  Do not use your cell phone when you are driving  This could distract you and cause an accident  Pull over if you need to make a call or send a text message  · Practice safe sex  Use latex condoms if are sexually active and have more than one partner  Your healthcare provider may recommend screening tests for sexually transmitted infections (STIs)  · Wear helmets, lifejackets, and protective gear  Always wear a helmet when you ride a bike or motorcycle, go skiing, or play sports that could cause a head injury  Wear protective equipment when you play sports  Wear a lifejacket when you are on a boat or doing water sports      © Copyright [employer] 2022 Information is for End User's use only and may not be sold, redistributed or otherwise used for commercial purposes  All illustrations and images included in CareNotes® are the copyrighted property of [employer] M [employer]  or Tristian Orlando  The above information is an  only  It is not intended as medical advice for individual conditions or treatments  Talk to your doctor, nurse or pharmacist before following any medical regimen to see if it is safe and effective for you  Obesity   AMBULATORY CARE:   Obesity  means your body mass index (BMI) is greater than 30  Your healthcare provider will use your height and weight to measure your BMI  The risks of obesity include  many health problems, including injuries or physical disability  · Diabetes (high blood sugar level)    · High blood pressure or high cholesterol    · Heart disease    · Stroke    · Gallbladder or liver disease    · Cancer of the colon, breast, prostate, liver, or kidney    · Sleep apnea    · Arthritis or gout    Screening  is done to check for health conditions before you have signs or symptoms  If you are 28to 79years old, your blood sugar level may be checked every 3 years for signs of prediabetes or diabetes  Your healthcare provider will check your blood pressure at each visit  High blood pressure can lead to a stroke or other problems  Your provider may check for signs of heart disease, cancer, or other health problems  Seek care immediately if:   · You have a severe headache, confusion, or difficulty speaking  · You have weakness on one side of your body  · You have chest pain, sweating, or shortness of breath  Call your doctor if:   · You have symptoms of gallbladder or liver disease, such as pain in your upper abdomen  · You have knee or hip pain and discomfort while walking  · You have symptoms of diabetes, such as intense hunger and thirst, and frequent urination      · You have symptoms of sleep apnea, such as snoring or daytime sleepiness  · You have questions or concerns about your condition or care  Treatment for obesity  focuses on helping you lose weight to improve your health  Even a small decrease in BMI can reduce the risk for many health problems  Your healthcare provider will help you set a weight-loss goal   · Lifestyle changes  are the first step in treating obesity  These include making healthy food choices and getting regular physical activity  Your healthcare provider may suggest a weight-loss program that involves coaching, education, and therapy  · Medicine  may help you lose weight when it is used with a healthy foods and physical activity  · Surgery  can help you lose weight if you are very obese and have other health problems  There are several types of weight-loss surgery  Ask your healthcare provider for more information  Tips for safe weight loss:   · Set small, realistic goals  An example of a small goal is to walk for 20 minutes 5 days a week  Anther goal is to lose 5% of your body weight  · Tell friends, family members, and coworkers about your goals  and ask for their support  Ask a friend to lose weight with you, or join a weight-loss support group  · Identify foods or triggers that may cause you to overeat , and find ways to avoid them  Remove tempting high-calorie foods from your home and workplace  Place a bowl of fresh fruit on your kitchen counter  If stress causes you to eat, then find other ways to cope with stress  A counselor or therapist may be able to help you  · Keep a diary to track what you eat and drink  Also write down how many minutes of physical activity you do each day  Weigh yourself once a week and record it in your diary  Eating changes: You will need to eat 500 to 1,000 fewer calories each day than you currently eat to lose 1 to 2 pounds a week  The following changes will help you cut calories:  · Eat smaller portions    Use small plates, no larger than 9 inches in diameter  Fill your plate half full of fruits and vegetables  Measure your food using measuring cups until you know what a serving size looks like  · Eat 3 meals and 1 or 2 snacks each day  Plan your meals in advance  Lavetta Fuss and eat at home most of the time  Eat slowly  Do not skip meals  Skipping meals can lead to overeating later in the day  This can make it harder for you to lose weight  Talk with a dietitian to help you make a meal plan and schedule that is right for you  · Eat fruits and vegetables at every meal   They are low in calories and high in fiber, which makes you feel full  Do not add butter, margarine, or cream sauce to vegetables  Use herbs to season steamed vegetables  · Eat less fat and fewer fried foods  Eat more baked or grilled chicken and fish  These protein sources are lower in calories and fat than red meat  Limit fast food  Dress your salads with olive oil and vinegar instead of bottled dressing  · Limit the amount of sugar you eat  Do not drink sugary beverages  Limit alcohol  Activity changes:  Physical activity is good for your body in many ways  It helps you burn calories and build strong muscles  It decreases stress and depression, and improves your mood  It can also help you sleep better  Talk to your healthcare provider before you begin an exercise program   · Exercise for at least 30 minutes 5 days a week  Start slowly  Set aside time each day for physical activity that you enjoy and that is convenient for you  It is best to do both weight training and an activity that increases your heart rate, such as walking, bicycling, or swimming  · Find ways to be more active  Do yard work and housecleaning  Walk up the stairs instead of using elevators  Spend your leisure time going to events that require walking, such as outdoor festivals or fairs  This extra physical activity can help you lose weight and keep it off         Follow up with your doctor as directed: You may need to meet with a dietitian  Write down your questions so you remember to ask them during your visits  © Copyright [employer] 2022 Information is for End User's use only and may not be sold, redistributed or otherwise used for commercial purposes  All illustrations and images included in CareNotes® are the copyrighted property of [employer] M [employer] Inc  or Tristian Doyle   The above information is an  only  It is not intended as medical advice for individual conditions or treatments  Talk to your doctor, nurse or pharmacist before following any medical regimen to see if it is safe and effective for you  Wellness Visit for Adults   WHAT YOU NEED TO KNOW:   What is a wellness visit? A wellness visit is when you see your healthcare provider to get screened for health problems  Your healthcare provider will also give you advice on how to stay healthy  Write down your questions so you remember to ask them  Ask your healthcare provider how often you should have a wellness visit  What happens at a wellness visit? Your healthcare provider will ask about your health, and your family history of health problems  This includes high blood pressure, heart disease, and cancer  He or she will ask if you have symptoms that concern you, if you smoke, and about your mood  You may also be asked about your intake of medicines, supplements, food, and alcohol  Any of the following may be done:  · Your weight  will be checked  Your height may also be checked so your body mass index (BMI) can be calculated  Your BMI shows if you are at a healthy weight  · Your blood pressure  and heart rate will be checked  Your temperature may also be checked  · Blood and urine tests  may be done  Blood tests may be done to check your cholesterol levels  Abnormal cholesterol levels increase your risk for heart disease and stroke   You may also need a blood or urine test to check for diabetes if you are at increased risk  Urine tests may be done to look for signs of an infection or kidney disease  · A physical exam  includes checking your heartbeat and lungs with a stethoscope  Your healthcare provider may also check your skin to look for sun damage  · Screening tests  may be recommended  A screening test is done to check for diseases that may not cause symptoms  The screening tests you may need depend on your age, gender, family history, and lifestyle habits  For example, colorectal screening may be recommended if you are 48years old or older  What screening tests do I need if I am a woman? · A Pap smear  is used to screen for cervical cancer  Pap smears are usually done every 3 to 5 years depending on your age  You may need them more often if you have had abnormal Pap smear test results in the past  Ask your healthcare provider how often you should have a Pap smear  · A mammogram  is an x-ray of your breasts to screen for breast cancer  Experts recommend mammograms every 2 years starting at age 48 years  You may need a mammogram at age 52 years or younger if you have an increased risk for breast cancer  Talk to your healthcare provider about when you should start having mammograms and how often you need them  What vaccines might I need? · Get an influenza vaccine  every year  The influenza vaccine protects you from the flu  Several types of viruses cause the flu  The viruses change over time, so new vaccines are made each year  · Get a tetanus-diphtheria (Td) booster vaccine  every 10 years  This vaccine protects you against tetanus and diphtheria  Tetanus is a severe infection that may cause painful muscle spasms and lockjaw  Diphtheria is a severe bacterial infection that causes a thick covering in the back of your mouth and throat  · Get a human papillomavirus (HPV) vaccine  if you are female and aged 23 to 32 or male 23 to 24 and never received it   This vaccine protects you from HPV infection  HPV is the most common infection spread by sexual contact  HPV may also cause vaginal, penile, and anal cancers  · Get a pneumococcal vaccine  if you are aged 72 years or older  The pneumococcal vaccine is an injection given to protect you from pneumococcal disease  Pneumococcal disease is an infection caused by pneumococcal bacteria  The infection may cause pneumonia, meningitis, or an ear infection  · Get a shingles vaccine  if you are 60 or older, even if you have had shingles before  The shingles vaccine is an injection to protect you from the varicella-zoster virus  This is the same virus that causes chickenpox  Shingles is a painful rash that develops in people who had chickenpox or have been exposed to the virus  How can I eat healthy? My Plate is a model for planning healthy meals  It shows the types and amounts of foods that should go on your plate  Fruits and vegetables make up about half of your plate, and grains and protein make up the other half  A serving of dairy is included on the side of your plate  The amount of calories and serving sizes you need depends on your age, gender, weight, and height  Examples of healthy foods are listed below:  · Eat a variety of vegetables  such as dark green, red, and orange vegetables  You can also include canned vegetables low in sodium (salt) and frozen vegetables without added butter or sauces  · Eat a variety of fresh fruits , canned fruit in 100% juice, frozen fruit, and dried fruit  · Include whole grains  At least half of the grains you eat should be whole grains  Examples include whole-wheat bread, wheat pasta, brown rice, and whole-grain cereals such as oatmeal     · Eat a variety of protein foods such as seafood (fish and shellfish), lean meat, and poultry without skin (turkey and chicken)  Examples of lean meats include pork leg, shoulder, or tenderloin, and beef round, sirloin, tenderloin, and extra lean ground beef   Other protein foods include eggs and egg substitutes, beans, peas, soy products, nuts, and seeds  · Choose low-fat dairy products such as skim or 1% milk or low-fat yogurt, cheese, and cottage cheese  · Limit unhealthy fats  such as butter, hard margarine, and shortening  How much exercise do I need? Exercise at least 30 minutes per day on most days of the week  Some examples of exercise include walking, biking, dancing, and swimming  You can also fit in more physical activity by taking the stairs instead of the elevator or parking farther away from stores  Include muscle strengthening activities 2 days each week  Regular exercise provides many health benefits  It helps you manage your weight, and decreases your risk for type 2 diabetes, heart disease, stroke, and high blood pressure  Exercise can also help improve your mood  Ask your healthcare provider about the best exercise plan for you  What are some general health and safety guidelines I should follow? · Do not smoke  Nicotine and other chemicals in cigarettes and cigars can cause lung damage  Ask your healthcare provider for information if you currently smoke and need help to quit  E-cigarettes or smokeless tobacco still contain nicotine  Talk to your healthcare provider before you use these products  · Limit alcohol  A drink of alcohol is 12 ounces of beer, 5 ounces of wine, or 1½ ounces of liquor  · Lose weight, if needed  Being overweight increases your risk of certain health conditions  These include heart disease, high blood pressure, type 2 diabetes, and certain types of cancer  · Protect your skin  Do not sunbathe or use tanning beds  Use sunscreen with a SPF 15 or higher  Apply sunscreen at least 15 minutes before you go outside  Reapply sunscreen every 2 hours  Wear protective clothing, hats, and sunglasses when you are outside  · Drive safely  Always wear your seatbelt  Make sure everyone in your car wears a seatbelt  A seatbelt can save your life if you are in an accident  Do not use your cell phone when you are driving  This could distract you and cause an accident  Pull over if you need to make a call or send a text message  · Practice safe sex  Use latex condoms if are sexually active and have more than one partner  Your healthcare provider may recommend screening tests for sexually transmitted infections (STIs)  · Wear helmets, lifejackets, and protective gear  Always wear a helmet when you ride a bike or motorcycle, go skiing, or play sports that could cause a head injury  Wear protective equipment when you play sports  Wear a lifejacket when you are on a boat or doing water sports  CARE AGREEMENT:   You have the right to help plan your care  Learn about your health condition and how it may be treated  Discuss treatment options with your healthcare providers to decide what care you want to receive  You always have the right to refuse treatment  The above information is an  only  It is not intended as medical advice for individual conditions or treatments  Talk to your doctor, nurse or pharmacist before following any medical regimen to see if it is safe and effective for you  © Copyright [employer] 2022 Information is for End User's use only and may not be sold, redistributed or otherwise used for commercial purposes  All illustrations and images included in CareNotes® are the copyrighted property of [employer]  or [address] [place], [place] [address]emily [facility]   GENERAL INFORMATION:   Hypertension  is high blood pressure (BP)  Your BP is the force of your blood moving against the walls of your arteries  Hypertension is a BP of 140/90 or higher  Hypertension causes your BP to get so high that your heart has to work much harder than normal  This can cause damage to your heart    Common symptoms include the following:   · Headache     · Blurred vision     · Chest pain     · Dizziness or weakness     · Trouble breathing    · Nosebleeds  Seek immediate care for the following symptoms:   · Severe headache or vision loss    · Weakness in an arm or leg    · Confusion or difficulty speaking    · Discomfort in your chest that feels like squeezing, pressure, fullness, or pain    · Suddenly feeling lightheaded or trouble breathing    · Pain or discomfort in your back, neck, jaw, stomach, or arm  Treatment for hypertension  may include medicine to lower your BP  You may also need to make lifestyle changes  Take your medicine exactly as directed  Manage hypertension:   · Take your BP at home  Sit and rest for 5 minutes before you take your BP  Extend your arm and support it on a flat surface  Your arm should be at the same level as your heart  Follow the directions that came with your BP monitor  If possible, take at least 2 BP readings each time  Take your BP at least twice a day at the same times each day, such as morning and evening  Keep a log of your BP readings and bring it to your follow-up visits  · Eat less sodium (salt)  Do not add sodium to your food  Limit foods that are high in sodium, such as canned foods, potato chips, and cold cuts  Your healthcare provider may suggest that you follow the [address] [place] [address]  The plan is low in sodium, unhealthy fats, and total fat  It is high in potassium, calcium, and fiber  · Exercise regularly  Exercise at least 30 minutes per day, on most days of the week  This will help decrease your BP  Ask your healthcare provider about the best exercise plan for you  · Limit alcohol  Women should limit alcohol to 1 drink a day  Men should limit alcohol to 2 drinks a day  A drink of alcohol is 12 ounces of beer, 5 ounces of wine, or 1½ ounces of liquor  · Do not smoke  If you smoke, it is never too late to quit  Smoking can increase your BP  Smoking also worsens other health conditions you may have that can increase your risk for hypertension  Ask your healthcare provider for information if you need help quitting  Follow up with your healthcare provider as directed: You will need to return to have your BP checked and to have other lab tests done  Write down your questions so you remember to ask them during your visits  CARE AGREEMENT:   You have the right to help plan your care  Learn about your health condition and how it may be treated  Discuss treatment options with your caregivers to decide what care you want to receive  You always have the right to refuse treatment  The above information is an  only  It is not intended as medical advice for individual conditions or treatments  Talk to your doctor, nurse or pharmacist before following any medical regimen to see if it is safe and effective for you  © 2014 [address] is for End User's use only and may not be sold, redistributed or otherwise used for commercial purposes  All illustrations and images included in CareNotes® are the copyrighted property of [employer]  or [employer]  Instructed patient to monitor her BP daily and call office if readings are consistently greater the 140s/90s

## 2022-04-18 NOTE — PROGRESS NOTES
[facility]    NAME: Dejan Salazar  AGE: 75 y o   SEX: female  : 1955     DATE: 2022     Assessment and Plan:     Problem List Items Addressed This Visit        Digestive    Gastroesophageal reflux disease with esophagitis       Endocrine    Acquired hypothyroidism    Relevant Orders    TSH, 3rd generation    TSH, 3rd generation       Cardiovascular and Mediastinum    Essential hypertension    Relevant Medications    triamterene-hydrochlorothiazide (DYAZIDE) 37 5-25 mg per capsule    Chronic deep vein thrombosis (DVT) (HCC)       Other    Pure hypercholesterolemia    Relevant Medications    atorvastatin (LIPITOR) 40 mg tablet    Other Relevant Orders    Lipid panel    Vitamin D deficiency    Relevant Medications    cholecalciferol (VITAMIN D3) 1,000 units tablet    Other Relevant Orders    Vitamin D 25 hydroxy    Vitamin B12 deficiency    Relevant Orders    Vitamin B12      Other Visit Diagnoses     Annual physical exam    -  Primary    Encounter for immunization        Encounter for screening mammogram for breast cancer        Relevant Orders    Mammo screening bilateral w 3d & cad    Encounter for annual physical exam        Lump in neck        Relevant Orders    US head neck soft tissue    Body mass index (BMI) 40 0-44 9, adult (HCC)        Renal insufficiency        Relevant Medications    triamterene-hydrochlorothiazide (DYAZIDE) 37 5-25 mg per capsule    Other Relevant Orders    Comprehensive metabolic panel    Comprehensive metabolic panel    Hyperglycemia        Relevant Orders    Comprehensive metabolic panel    Hemoglobin A1C    Insulin, fasting    Anemia, unspecified type        Relevant Orders    CBC and differential    Other hyperlipidemia        Relevant Medications    atorvastatin (LIPITOR) 40 mg tablet    Left hip pain        Relevant Medications    diclofenac sodium (VOLTAREN) 50 mg EC tablet Localized swelling of both lower legs        Relevant Medications    triamterene-hydrochlorothiazide (DYAZIDE) 37 5-25 mg per capsule    Candidiasis of skin        Relevant Medications    ketoconazole (NIZORAL) 2 % cream    Seasonal allergic rhinitis, unspecified trigger        Relevant Medications    diclofenac sodium (VOLTAREN) 50 mg EC tablet    fluticasone (FLONASE) 50 mcg/act nasal spray          Immunizations and preventive care screenings were discussed with patient today  Appropriate education was printed on patient's after visit summary  Counseling:  · Injury prevention: discussed safety/seat belts, safety helmets, smoke detectors, carbon dioxide detectors, and smoking near bedding or upholstery  BMI Counseling: Body mass index is 42 09 kg/m²  The BMI is above normal  Nutrition recommendations include decreasing portion sizes, encouraging healthy choices of fruits and vegetables, decreasing fast food intake, consuming healthier snacks, limiting drinks that contain sugar, moderation in carbohydrate intake, increasing intake of lean protein, reducing intake of saturated and trans fat and reducing intake of cholesterol  Exercise recommendations include exercising 3-5 times per week  No pharmacotherapy was ordered  Rationale for BMI follow-up plan is due to patient being overweight or obese  Depression Screening and Follow-up Plan: Patient was screened for depression during today's encounter  They screened negative with a PHQ-2 score of 2  Return in about 6 months (around 10/18/2022) for Recheck  Chief Complaint:     No chief complaint on file  History of Present Illness:     Adult Annual Physical   Patient here for a comprehensive physical exam  Vital signs reviewed and stable  C/O left eye starting to get cloudy and does already have cloudiness of right eye   Patient is going to schedule appointment with her [facility]   Complete medical history and medications reviewed with patient and tolerating all medications well without any problems  Patient just took her BP medications around 6:30 AM   Labs reviewed with patient  Patient is scheduled on 4/21/22 with [facility] for Annual Gynecology Exam and Mammogram   Patient requesting refill on her Flonase NS for allergies  Diet and Physical Activity  · Diet/Nutrition: well balanced diet, heart healthy (low sodium) diet, limited junk food, low fat diet, consuming 3-5 servings of fruits/vegetables daily and adequate fiber intake  · Exercise: walking  Depression Screening  PHQ-2/9 Depression Screening    Little interest or pleasure in doing things: 1 - several days  Feeling down, depressed, or hopeless: 1 - several days  PHQ-2 Score: 2  PHQ-2 Interpretation: Negative depression screen       General Health  · Sleep: sleeps well  · Hearing: normal - bilateral   · Vision: goes for regular eye exams  · Dental: regular dental visits  /GYN Health  · Patient is: postmenopausal  · Last menstrual period: 2006  · Contraceptive method: Postmenopausal      Review of Systems:     Review of Systems   GENERAL:  Feels well, denies any significant changes in weight without trying  SKIN:  Denies rashes, lesions, opened areas, wounds, change in moles or any other skin changes  HEENT:  Denies any head injury or headaches  C/O cloudiness of B/L eyes  Negative significant changes in vision or hearing  Negative tinnitus, vertigo, or infections  Negative hay fever, sinus trouble, nasal discharge, bloody noses, or problems with smell  Negative sore throat, bleeding gums, ulcers, or sores  NECK:  Denies lumps, goiter, pain, swollen glands, or lymphadenopathy  BREASTS:  Denies lumps, pain, nipple discharge, swelling, redness, or any other changes  RESPIRATORY:  Denies cough, wheezing, shortness of breath, dyspnea, or orthopnea  CARDIOVASCULAR:  Denies chest pain or palpitations     GASTROINTESTINAL:  Appetite good, denies nausea, vomiting, or indigestion  Bowel movements normal occurring about once daily or every other day  URINARY:  Denies frequency, urgency, incontinence, dysuria, hematuria, nocturia, or recent flank pain  GENITAL:  Denies vaginal discharge, pelvic infection, lesions, ulcers, or pain  Negative dyspareunia or abnormal vaginal bleeding  PERIPHERAL VASCULAR:  Denies varicosities, swelling, skin changes, or pain  MUSCULOSKELETAL:  Denies back, joint, or muscle pain  Negative problems with mobility or movement  PSYCHIATRIC:  Denies problems with depression, anxiety, anger, or other psychiatric symptoms  NEUROLOGIC:  Denies fainting, dizziness, memory problems, seizures, tingling, motor or sensory loss  HEMATOLOGIC:  Denies easy bruising, bleeding, or anemia  ENDOCRINE:  Denies thyroid problems, temperature intolerance, excessive sweating, or other endocrine symptoms        Past Medical History:     Past Medical History:   Diagnosis Date    Allergic rhinitis     Carpal tunnel syndrome on both sides     Cervical disc herniation     DVT (deep venous thrombosis) (HCC)     DVT Left Leg    Fracture of foot     left casted    History of mammogram 2017    Being followed by Dr Shantel Kelly History of Papanicolaou smear of cervix 2016    Dr Shantel Kelly Hx of colonoscopy 2016    Dr Jose Enrique Sanchez Hyperlipemia     Hypertension     Hypothyroidism     Lichen sclerosus     Renal calculi     Rotator cuff tear, left       Past Surgical History:     Past Surgical History:   Procedure Laterality Date    CERVICAL LAMINECTOMY N/A     CHOLECYSTECTOMY N/A     KNEE CARTILAGE SURGERY Bilateral     LITHOTRIPSY N/A     NEUROPLASTY / TRANSPOSITION MEDIAN NERVE AT CARPAL TUNNEL BILATERAL Bilateral     ROTATOR CUFF REPAIR Left     TONSILLECTOMY AND ADENOIDECTOMY Bilateral     TONSILLECTOMY AND ADENOIDECTOMY      TUBAL LIGATION N/A     US GUIDED THYROID BIOPSY  11/2018    WRIST FRACTURE SURGERY Right     plates and pins       Social History:     Social History     Socioeconomic History    Marital status: /Civil Union     Spouse name: None    Number of children: None    Years of education: None    Highest education level: None   Occupational History    None   Tobacco Use    Smoking status: Former Smoker     Packs/day: 0 30     Years: 10 00     Pack years: 3 00     Types: Cigarettes     Quit date: 2/24/2019     Years since quitting: 3 1    Smokeless tobacco: Never Used    Tobacco comment: quite date 3 year   Vaping Use    Vaping Use: Never used   Substance and Sexual Activity    Alcohol use: No    Drug use: No    Sexual activity: Not Currently   Other Topics Concern    None   Social History Narrative    None     Social Determinants of Health     Financial Resource Strain: Not on file   Food Insecurity: Not on file   Transportation Needs: Not on file   Physical Activity: Not on file   Stress: Not on file   Social Connections: Not on file   Intimate Partner Violence: Not on file   Housing Stability: Not on file      Family History:     Family History   Problem Relation Age of Onset    Hypertension Mother     Hyperlipidemia Mother     Hypothyroidism Mother     Brain cancer Mother     Hypertension Father     Celiac disease Father     Ovarian cancer Sister     Celiac disease Brother     Hypothyroidism Brother     Diabetes Paternal [de-identified]     Hypothyroidism Sister     Coronary artery disease Brother     Hypertension Brother     Hyperlipidemia Brother     Diabetes Sister       Current Medications:     Current Outpatient Medications   Medication Sig Dispense Refill    ascorbic acid (VITAMIN C) 250 mg tablet Take 500 mg by mouth daily      aspirin (ECOTRIN LOW STRENGTH) 81 mg EC tablet Take 1 tablet (81 mg total) by mouth daily 90 tablet 1    atorvastatin (LIPITOR) 40 mg tablet Take 1 tablet (40 mg total) by mouth daily 90 tablet 3    cholecalciferol (VITAMIN D3) 1,000 units tablet Take 2 tablets (2,000 Units total) by mouth daily 90 tablet 1    diclofenac sodium (VOLTAREN) 50 mg EC tablet Take 1 tablet (50 mg total) by mouth 2 (two) times a day 180 tablet 1    fluticasone (FLONASE) 50 mcg/act nasal spray 1 spray each nostril 2 x daily prn congestion 18 2 mL 5    ketoconazole (NIZORAL) 2 % cream Apply topically 2 (two) times a day as needed for itching, irritation or rash 60 g 5    KP Vitamin B-12 1000 MCG tablet Take 1 tablet (1,000 mcg total) by mouth every other day 90 tablet 1    levothyroxine 125 mcg tablet Take 1 tablet (125 mcg total) by mouth daily in the early morning 90 tablet 3    loratadine (CLARITIN) 10 mg tablet 1 tab po daily x 14 days then prn allergies 30 tablet 3    magnesium oxide (MAG-OX) 400 mg Take 1 tablet (400 mg total) by mouth daily at bedtime 90 tablet 1    mupirocin (BACTROBAN) 2 % ointment Apply topically 3 (three) times a day for 10 days 22 g 2    pantoprazole (PROTONIX) 40 mg tablet Take 1 tablet (40 mg total) by mouth daily 90 tablet 1    RA CALCIUM 600/VITAMIN D-3 600-400 MG-UNIT TABS Take 1 tablet by mouth 2 (two) times a day  0    triamterene-hydrochlorothiazide (DYAZIDE) 37 5-25 mg per capsule Take 1 capsule by mouth every morning 90 capsule 1    Zinc 25 MG TABS Take 1 tablet by mouth daily       No current facility-administered medications for this visit  Allergies:     No Known Allergies   Physical Exam:     /80   Pulse 76   Temp 98 8 °F (37 1 °C)   Resp 18   Ht 5' 8" (1 727 m)   Wt 126 kg (276 lb 12 8 oz)   LMP 01/01/2004   SpO2 92%   BMI 42 09 kg/m²     Physical Exam   GENERAL:  Appears well nourished, well groomed, in no acute distress  SKIN:  Palms warm, dry, color good  Nails without clubbing or cyanosis  No lesions, ulcerations, or wounds  HEAD:  Hair is average texture  Scalp without lesions, normocephalic, and atraumatic  EYES:  Visual fields full by confrontation  Conjunctiva pink, sclera white, PERRLA   Cloudy appearance of B/L eyes noted  EARS:  B/L ear canals clear  B/L TMs clear with + light reflex  NOSE: Mucosa pink, moist, septum midline  Negative sinus tenderness  B/L turbinates pink, moist, non-edematous without exudate  MOUTH:  Oral mucosa pink  Pharynx pink, moist, without swelling, redness, or exudate  Dentition ok  Tongue midline  NECK:  Supple, trachea midline, Negative thyromegaly, lymphadenopathy, or swollen glands  LYMPH NODES:  Negative enlargement of neck, axillary, epitrochlear, or inguinal nodes  THORAX/LUNGS  Thorax symmetric with good excursion  Lungs resonant  Breath sounds vesicular with no added sounds  Diaphragm descends within normal limits  CARDIOVASCULAR:  Carotid upstrokes brisk and without bruits  Apical impulse discrete and tapping, barely palpable in the 5th ICS/MCL  Normal S1 and Normal S2, Negative S3 or S4  Negative murmurs, thrills, lifts, or heaves  ABDOMEN:  Protuberant, bowel sounds normal active x 4 quadrants  Negative tenderness  Negative masses  Negative hepatomegaly  Negative splenomegaly  Negative costovertebral tenderness  EXTREMITIES:  Warm, calves supple, non-tender, negative for edema  Negative stasis pigmentation or ulcers  +2 pulses throughout  MUSCULOSKELETAL:  Negative joint deformities  Good range of motion in hands, wrists, elbows, shoulders, spine, hips, knees, and ankles  NEUROLOGICAL:  Mental status:  Awake, alert, and oriented to person, place, time, and event  Normal thought processes             Nick Garcia

## 2022-04-25 ENCOUNTER — TELEPHONE (OUTPATIENT)
Dept: ADMINISTRATIVE | Facility: OTHER | Age: 67
End: 2022-04-25

## 2022-04-25 NOTE — TELEPHONE ENCOUNTER
----- Message from Lion Petit MA sent at 4/22/2022  7:48 AM EDT -----  Regarding: care gap request  04/22/22 7:48 AM    Hello, our patient attached above has had Mammogram completed/performed  Please assist in updating the patient chart by pulling the Care Everywhere (CE) document  The date of service is 2022       Thank you,  Lion Petit MA  [facility]

## 2022-04-26 NOTE — TELEPHONE ENCOUNTER
Upon review of the In Basket request we were able to locate, review, and update the patient chart as requested for Mammogram     Any additional questions or concerns should be emailed to the Practice Liaisons via [email or web address] email, please do not reply via In Basket      Thank you  Frances Garza

## 2022-05-18 ENCOUNTER — TELEPHONE (OUTPATIENT)
Dept: OTHER | Facility: OTHER | Age: 67
End: 2022-05-18

## 2022-05-18 NOTE — TELEPHONE ENCOUNTER
Wil Mora ([facility]) [phone]     Is calling to request the following reports be faxed to [phone]:    CT soft tissue neck w wo contrast (Order #[id number]) on 9/25/2018 - Order Result History Report      CT head w wo contrast (Order #[id number]) on 9/25/2018 - Order Result History Report

## 2022-05-27 ENCOUNTER — APPOINTMENT (EMERGENCY)
Dept: RADIOLOGY | Facility: HOSPITAL | Age: 67
End: 2022-05-27
Payer: COMMERCIAL

## 2022-05-27 ENCOUNTER — HOSPITAL ENCOUNTER (EMERGENCY)
Facility: HOSPITAL | Age: 67
Discharge: HOME | End: 2022-05-27
Attending: EMERGENCY MEDICINE
Payer: COMMERCIAL

## 2022-05-27 ENCOUNTER — APPOINTMENT (EMERGENCY)
Dept: CT IMAGING | Facility: HOSPITAL | Age: 67
End: 2022-05-27
Payer: COMMERCIAL

## 2022-05-27 VITALS
WEIGHT: 276 LBS | TEMPERATURE: 97.5 F | SYSTOLIC BLOOD PRESSURE: 127 MMHG | HEART RATE: 66 BPM | OXYGEN SATURATION: 98 % | BODY MASS INDEX: 41.83 KG/M2 | HEIGHT: 68 IN | RESPIRATION RATE: 18 BRPM | DIASTOLIC BLOOD PRESSURE: 75 MMHG

## 2022-05-27 DIAGNOSIS — S82.401A RIGHT FIBULAR FRACTURE: ICD-10-CM

## 2022-05-27 DIAGNOSIS — S82.141A TIBIAL PLATEAU FRACTURE, RIGHT: ICD-10-CM
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
## 2022-05-27 DIAGNOSIS — E04.9 THYROID GOITER: ICD-10-CM

## 2022-05-27 LAB
ATRIAL RATE: 60 BPM
P AXIS: 56 DEGREES
PR INTERVAL: 186 MS
QRS AXIS: 28 DEGREES
QRSD INTERVAL: 94 MS
QT INTERVAL: 414 MS
QTC INTERVAL: 414 MS
T WAVE AXIS: 44 DEGREES
VENTRICULAR RATE: 60 BPM

## 2022-05-27 PROCEDURE — 73564 X-RAY EXAM KNEE 4 OR MORE: CPT

## 2022-05-27 PROCEDURE — 99285 EMERGENCY DEPT VISIT HI MDM: CPT | Performed by: EMERGENCY MEDICINE

## 2022-05-27 PROCEDURE — 96374 THER/PROPH/DIAG INJ IV PUSH: CPT

## 2022-05-27 PROCEDURE — 73700 CT LOWER EXTREMITY W/O DYE: CPT

## 2022-05-27 PROCEDURE — G1004 CDSM NDSC: HCPCS

## 2022-05-27 PROCEDURE — 71045 X-RAY EXAM CHEST 1 VIEW: CPT

## 2022-05-27 PROCEDURE — 72125 CT NECK SPINE W/O DYE: CPT

## 2022-05-27 PROCEDURE — 73590 X-RAY EXAM OF LOWER LEG: CPT

## 2022-05-27 PROCEDURE — 93005 ELECTROCARDIOGRAM TRACING: CPT

## 2022-05-27 PROCEDURE — 96375 TX/PRO/DX INJ NEW DRUG ADDON: CPT

## 2022-05-27 PROCEDURE — 74176 CT ABD & PELVIS W/O CONTRAST: CPT

## 2022-05-27 PROCEDURE — 73552 X-RAY EXAM OF FEMUR 2/>: CPT

## 2022-05-27 PROCEDURE — 93010 ELECTROCARDIOGRAM REPORT: CPT | Performed by: INTERNAL MEDICINE

## 2022-05-27 PROCEDURE — 73502 X-RAY EXAM HIP UNI 2-3 VIEWS: CPT

## 2022-05-27 PROCEDURE — 99284 EMERGENCY DEPT VISIT MOD MDM: CPT

## 2022-05-27 RX ORDER — OXYCODONE HYDROCHLORIDE 5 MG/1
5 TABLET ORAL EVERY 6 HOURS PRN
Qty: 20 TABLET | Refills: 0 | Status: SHIPPED | OUTPATIENT
Start: 2022-05-27 | End: 2022-06-01 | Stop reason: SDUPTHER

## 2022-05-27 RX ORDER — MORPHINE SULFATE 4 MG/ML
4 INJECTION, SOLUTION INTRAMUSCULAR; INTRAVENOUS ONCE
Status: COMPLETED | OUTPATIENT
Start: 2022-05-27 | End: 2022-05-27

## 2022-05-27 RX ORDER — ONDANSETRON 2 MG/ML
4 INJECTION INTRAMUSCULAR; INTRAVENOUS ONCE
Status: COMPLETED | OUTPATIENT
Start: 2022-05-27 | End: 2022-05-27

## 2022-05-27 RX ADMIN — ONDANSETRON 4 MG: 2 INJECTION INTRAMUSCULAR; INTRAVENOUS at 03:31

## 2022-05-27 RX ADMIN — MORPHINE SULFATE 4 MG: 4 INJECTION, SOLUTION INTRAMUSCULAR; INTRAVENOUS at 03:31

## 2022-05-27 NOTE — DISCHARGE INSTRUCTIONS
Nonweightbearing to the right leg  Knee immobilizer on until recheck with Orthopedics  Ice the knee  Tylenol 650mg every 6 hours as needed for pain, fever (max 3000mg in 24 hours)   Oxycodone for breakthru pain  No drinking driving or heavy machinery use with this medication use MiraLax to stay regular  No drinking, driving or heavy machinery use for 6 hours after discharge     Return with worsening pain numbness tingling

## 2022-05-27 NOTE — ED PROVIDER NOTES
History  Chief Complaint   Patient presents with    Knee Pain     Patient was coming down two steps from her bathroom and her knee went one way and she sherry went the other way     58-year-old female was coming down the stairs her right foot was planted she ended up slipping on a step her left foot went out and she is centrally sat on her right foot with her right knee going straight down resulting in right knee pain and inability to bear weight on that side she does complain of some mild right hip tenderness she denies any numbness or paresthesias she did not hit her head only anticoagulant is a baby aspirin  She did not fall back she complains of some mild low back tenderness  No history of any bowel or bladder incontinence no abdominal pain no chest pain or shortness of breath no rib pain she has no ankle pain or foot pain  She otherwise ambulates without the aid of a cane or a walker  Past medical history significant for hyperlipidemia hypertension hypothyroidism kidney stones            Prior to Admission Medications   Prescriptions Last Dose Informant Patient Reported? Taking?    KP Vitamin B-12 1000 MCG tablet 5/26/2022 at Unknown time Self No Yes   Sig: Take 1 tablet (1,000 mcg total) by mouth every other day   RA CALCIUM 600/VITAMIN D-3 600-400 MG-UNIT TABS 5/26/2022 at Unknown time Self Yes Yes   Sig: Take 1 tablet by mouth 2 (two) times a day   Zinc 25 MG TABS 5/26/2022 at Unknown time Self Yes Yes   Sig: Take 1 tablet by mouth daily   ascorbic acid (VITAMIN C) 250 mg tablet 5/26/2022 at Unknown time Self Yes Yes   Sig: Take 500 mg by mouth daily   aspirin (ECOTRIN LOW STRENGTH) 81 mg EC tablet 5/26/2022 at Unknown time Self No Yes   Sig: Take 1 tablet (81 mg total) by mouth daily   atorvastatin (LIPITOR) 40 mg tablet 5/26/2022 at Unknown time  No Yes   Sig: Take 1 tablet (40 mg total) by mouth daily   cholecalciferol (VITAMIN D3) 1,000 units tablet 5/26/2022 at Unknown time  No Yes   Sig: Take 2 tablets (2,000 Units total) by mouth daily   diclofenac sodium (VOLTAREN) 50 mg EC tablet 2022 at Unknown time  No Yes   Sig: Take 1 tablet (50 mg total) by mouth 2 (two) times a day   fluticasone (FLONASE) 50 mcg/act nasal spray 2022 at Unknown time  No Yes   Si spray each nostril 2 x daily prn congestion   ketoconazole (NIZORAL) 2 % cream 2022 at Unknown time  No Yes   Sig: Apply topically 2 (two) times a day as needed for itching, irritation or rash   levothyroxine 125 mcg tablet 2022 at Unknown time  No Yes   Sig: Take 1 tablet (125 mcg total) by mouth daily in the early morning   loratadine (CLARITIN) 10 mg tablet 2022 at Unknown time  No Yes   Si tab po daily x 14 days then prn allergies   magnesium oxide (MAG-OX) 400 mg 2022 at Unknown time Self No Yes   Sig: Take 1 tablet (400 mg total) by mouth daily at bedtime   mupirocin (BACTROBAN) 2 % ointment   No No   Sig: Apply topically 3 (three) times a day for 10 days   pantoprazole (PROTONIX) 40 mg tablet 2022 at Unknown time Self No Yes   Sig: Take 1 tablet (40 mg total) by mouth daily   triamterene-hydrochlorothiazide (DYAZIDE) 37 5-25 mg per capsule 2022 at Unknown time  No Yes   Sig: Take 1 capsule by mouth every morning      Facility-Administered Medications: None       Past Medical History:   Diagnosis Date    Allergic rhinitis     Carpal tunnel syndrome on both sides     Cervical disc herniation     DVT (deep venous thrombosis) (HCC)     DVT Left Leg    Fracture of foot     left casted    History of mammogram 2017    Being followed by Dr Earley Castleman History of Papanicolaou smear of cervix 2016    Dr Earley Castleman Hx of colonoscopy 2016    Dr Lamont Ramires    Hyperlipemia     Hypertension     Hypothyroidism     Lichen sclerosus     Renal calculi     Rotator cuff tear, left        Past Surgical History:   Procedure Laterality Date    CERVICAL LAMINECTOMY N/A     CHOLECYSTECTOMY N/A     KNEE CARTILAGE SURGERY Bilateral     LITHOTRIPSY N/A     NEUROPLASTY / TRANSPOSITION MEDIAN NERVE AT CARPAL TUNNEL BILATERAL Bilateral     ROTATOR CUFF REPAIR Left     TONSILLECTOMY AND ADENOIDECTOMY Bilateral     TONSILLECTOMY AND ADENOIDECTOMY      TUBAL LIGATION N/A     US GUIDED THYROID BIOPSY  11/2018    WRIST FRACTURE SURGERY Right     plates and pins        Family History   Problem Relation Age of Onset    Hypertension Mother     Hyperlipidemia Mother     Hypothyroidism Mother    Conrad Goins Brain cancer Mother     Hypertension Father     Celiac disease Father     Ovarian cancer Sister     Celiac disease Brother     Hypothyroidism Brother     Diabetes Paternal [de-identified]     Hypothyroidism Sister     Coronary artery disease Brother     Hypertension Brother     Hyperlipidemia Brother     Diabetes Sister      I have reviewed and agree with the history as documented  E-Cigarette/Vaping    E-Cigarette Use Never User      E-Cigarette/Vaping Substances    Nicotine No     THC No     CBD No     Flavoring No     Other No     Unknown No      Social History     Tobacco Use    Smoking status: Former Smoker     Packs/day: 0 30     Years: 10 00     Pack years: 3 00     Types: Cigarettes     Quit date: 2/24/2019     Years since quitting: 3 2    Smokeless tobacco: Never Used    Tobacco comment: quite date 1 year   Vaping Use    Vaping Use: Never used   Substance Use Topics    Alcohol use: No    Drug use: No       Review of Systems   Constitutional: Negative for activity change, chills and fever  HENT: Negative for congestion, ear pain, rhinorrhea, sneezing and sore throat  Eyes: Negative for discharge  Respiratory: Negative for cough and shortness of breath  Cardiovascular: Negative for chest pain and leg swelling  Gastrointestinal: Positive for abdominal pain  Negative for blood in stool, diarrhea, nausea and vomiting  Endocrine: Negative for polyuria     Genitourinary: Negative for dysuria, frequency and urgency  Musculoskeletal: Positive for arthralgias (rt hip and rt knee), back pain, gait problem, joint swelling and myalgias  Negative for neck pain and neck stiffness  Skin: Negative for rash and wound  Neurological: Positive for weakness (rt knee)  Negative for dizziness and numbness  Hematological: Negative for adenopathy  Psychiatric/Behavioral: Negative for confusion  All other systems reviewed and are negative  Physical Exam  Physical Exam  Vitals and nursing note reviewed  Constitutional:       General: She is in acute distress  Appearance: She is well-developed  She is not ill-appearing, toxic-appearing or diaphoretic  HENT:      Head: Normocephalic and atraumatic  Right Ear: Tympanic membrane and external ear normal       Left Ear: Tympanic membrane and external ear normal       Nose: Nose normal       Mouth/Throat:      Mouth: Mucous membranes are moist       Pharynx: No oropharyngeal exudate  Eyes:      General:         Right eye: No discharge  Left eye: No discharge  Extraocular Movements: Extraocular movements intact  Conjunctiva/sclera: Conjunctivae normal       Pupils: Pupils are equal, round, and reactive to light  Neck:      Comments: No midline or paraspinous tenderness  Cardiovascular:      Rate and Rhythm: Normal rate and regular rhythm  Pulses: Normal pulses  Heart sounds: Normal heart sounds  Pulmonary:      Effort: Pulmonary effort is normal  No respiratory distress  Breath sounds: Normal breath sounds  No wheezing, rhonchi or rales  Chest:      Chest wall: No tenderness  Abdominal:      General: Bowel sounds are normal  There is no distension  Palpations: Abdomen is soft  Tenderness: There is no abdominal tenderness  There is no right CVA tenderness, left CVA tenderness, guarding or rebound        Comments: Back no midline T or L-spine tenderness   Musculoskeletal:         General: No tenderness or deformity  Normal range of motion  Cervical back: Normal range of motion and neck supple  No rigidity or tenderness  Legs:       Comments: Patient has some mild right greater trochanter tenderness and tenderness at the right SI joint  There is no ecchymosis or bruising she is nontender along the right femur she does have an ecchymosis to the proximal fibular region  Unable to palpate effusion due to body habitus negative patellar apprehension test did not attempt range of motion of the knee  She has no tenderness to the distal right tib-fib region range of motion of the right ankle intact L4-S1 dermatomes are intact to the foot  There is no foot tenderness Costa's test reveals an intact Achilles tendon  Skin:     General: Skin is warm and dry  Capillary Refill: Capillary refill takes less than 2 seconds  Neurological:      Mental Status: She is alert and oriented to person, place, and time  Cranial Nerves: No cranial nerve deficit  Sensory: No sensory deficit  Motor: Weakness present  No abnormal muscle tone  Coordination: Coordination normal (Right leg)        Comments: Gait steady   Psychiatric:         Mood and Affect: Mood normal          Vital Signs  ED Triage Vitals [05/27/22 0155]   Temperature Pulse Respirations Blood Pressure SpO2   97 5 °F (36 4 °C) 66 18 127/75 98 %      Temp Source Heart Rate Source Patient Position - Orthostatic VS BP Location FiO2 (%)   Tympanic Monitor Lying Right arm --      Pain Score       8           Vitals:    05/27/22 0155   BP: 127/75   Pulse: 66   Patient Position - Orthostatic VS: Lying         Visual Acuity      ED Medications  Medications   ondansetron (ZOFRAN) injection 4 mg (4 mg Intravenous Given 5/27/22 0331)   morphine (PF) 4 mg/mL injection 4 mg (4 mg Intravenous Given 5/27/22 0331)       Diagnostic Studies  Results Reviewed     None                 XR femur 2 views RIGHT   ED Interpretation by Westley Olszewski, MD (05/27 3643)   Read by me; Radiologist to provide formal interpretation  No acute fracture      XR chest 1 view portable   ED Interpretation by Manpreet Hurt MD (05/27 4539)   Read by me; Radiologist to provide formal interpretation  No acute process        Final Result by Kimmy Melendez MD (05/27 2546)      No acute cardiopulmonary disease  Goiter  Workstation performed: [id number]         XR tibia fibula 2 views RIGHT   ED Interpretation by Manpreet Hurt MD (05/27 3643)   Read by me; Radiologist to provide formal interpretation  Patient has proximal fibular fracture and tibial plateau fracture      CT lower extremity wo contrast right   Final Result by Sabas Garcia DO (05/27 2463)      Comminuted fracture of the proximal tibia with extension into the lateral tibial plateau, as described  No knee dislocation  Comminuted fracture of the fibular head without significant displacement  Knee joint effusion with small to moderate-sized lipohemarthrosis most prominent in the suprapatellar space  Degenerative changes of the knee, and other findings as above  Workstation performed: [id number]         CT abdomen pelvis wo contrast   Final Result by Kasie Madrid MD (05/27 8391)      No renal stones  No small bowel obstruction  Normal appendix  Consider contrast exam in the appropriate clinical setting  Workstation performed: [id number]         CT cervical spine without contrast   Final Result by Kasie Madrid MD (05/27 2531)      No cervical spine fracture or traumatic malalignment  Shotty mediastinal adenopathy is noted in the visualized portion of the mediastinum         Prominent thyroid gland is noted concerning for goiter  Recommend elective ultrasound for further evaluation  The study was marked in [facility] for immediate notification        Workstation performed: [id number]         XR hip/pelv 2-3 vws right if performed   ED Interpretation by Misti Jean MD (09/25 5403)   Read by me; Radiologist to provide formal interpretation  No acute fracture      XR knee 4+ vw right injury   ED Interpretation by Misti Jean MD (05/27 6539)   Read by me; Radiologist to provide formal interpretation  Proximal fibular fracture and tibial plateau fracture                 Procedures  ECG 12 Lead Documentation Only    Date/Time: 5/27/2022 2:00 AM  Performed by: Misti Jean MD  Authorized by: Misti Jean MD     Indications / Diagnosis:  Fall  Patient location:  ED  Previous ECG:     Previous ECG:  Unavailable (no prev)  Rate:     ECG rate:  60    ECG rate assessment: normal    Rhythm:     Rhythm: sinus rhythm    QRS:     QRS axis:  Normal  Comments:      No acute ischemic changes             ED Course  ED Course as of 05/27/22 0826   Fri May 27, 2022   8366 Case reviewed with Dr Collin Starkey of orthopedics recommends outpatient follow-up knee immobilizer nonweightbearing these were all reviewed with patient and her     Aneudy Flores PA  reviewed no patient match criteria were found                                             MDM  Number of Diagnoses or Management Options  Fall down stairs  Right fibular fracture  Thyroid goiter  Tibial plateau fracture, right  Diagnosis management comments: Mdm:  Patient will undergo CT abdomen pelvis without contrast (secondary to global shortage of IV contrast) to further evaluate lumbar spine and pelvis  Will add CT of the knee to further delineate the tibial plateau fracture  Due to distracting injury CT of the C-spine will be obtained      Rt knee immobilizer placed by Cooper Pimentel RN CMS intact after placement      Disposition  Final diagnoses:   Fall down stairs   Tibial plateau fracture, right   Right fibular fracture - proximal   Thyroid goiter - seen on CT of neck     Time reflects when diagnosis was documented in both MDM as applicable and the Disposition within this note     Time User Action Codes Description Comment    5/27/2022  6:20 AM Marcheta Larsen Add Reshma Hearn Fall down stairs     5/27/2022  6:20 AM Marcheta Larsen Add [Z03 163S] Tibial plateau fracture, right     5/27/2022  6:20 AM Marcheta Larsen Add [F19 699O] Right fibular fracture     5/27/2022  6:20 AM Marcheta Larsen Add [E04 9] Thyroid goiter     5/27/2022  6:21 AM Marcheta Larsen Modify [M44 696Y] Right fibular fracture proximal    5/27/2022  6:21 AM Marcheta Larsen Modify [E04 9] Thyroid goiter seen on CT of neck      ED Disposition     ED Disposition   Discharge    Condition   Stable    Date/Time   Fri May 27, 2022  6:16 AM    Comment   Jo Partida discharge to home/self care                 Follow-up Information     Follow up With Specialties Details Why Contact Info Additional [address] [facility] [place] [facility] Call  recheck of fractures [address] [facility] [address]  [address] [place] [address] Specialists Amy [address]    [facility], [address] [place] [address], Nurse Practitioner Call in 1 day Further follow-up of really thyroid goiter [place]  [facility] [address] [place] [address] [phone]             Discharge Medication List as of 5/27/2022  6:28 AM      START taking these medications    Details   oxyCODONE (Roxicodone) 5 immediate release tablet Take 1 tablet (5 mg total) by mouth every 6 (six) hours as needed for moderate pain for up to 20 doses Max Daily Amount: 20 mg, Starting Fri 5/27/2022, Normal         CONTINUE these medications which have NOT CHANGED    Details   ascorbic acid (VITAMIN C) 250 mg tablet Take 500 mg by mouth daily, Historical Med      aspirin (ECOTRIN LOW STRENGTH) 81 mg EC tablet Take 1 tablet (81 mg total) by mouth daily, Starting Tue 6/30/2020, No Print      atorvastatin (LIPITOR) 40 mg tablet Take 1 tablet (40 mg total) by mouth daily, Starting Mon 4/18/2022, Normal      cholecalciferol (VITAMIN D3) 1,000 units tablet Take 2 tablets (2,000 Units total) by mouth daily, Starting Mon 4/18/2022, No Print      diclofenac sodium (VOLTAREN) 50 mg EC tablet Take 1 tablet (50 mg total) by mouth 2 (two) times a day, Starting Mon 4/18/2022, Normal      fluticasone (FLONASE) 50 mcg/act nasal spray 1 spray each nostril 2 x daily prn congestion, Normal      ketoconazole (NIZORAL) 2 % cream Apply topically 2 (two) times a day as needed for itching, irritation or rash, Starting Mon 4/18/2022, Normal      KP Vitamin B-12 1000 MCG tablet Take 1 tablet (1,000 mcg total) by mouth every other day, Starting Wed 3/17/2021, Normal      levothyroxine 125 mcg tablet Take 1 tablet (125 mcg total) by mouth daily in the early morning, Starting Mon 4/4/2022, Normal      loratadine (CLARITIN) 10 mg tablet 1 tab po daily x 14 days then prn allergies, Normal      magnesium oxide (MAG-OX) 400 mg Take 1 tablet (400 mg total) by mouth daily at bedtime, Starting Mon 1/27/2020, Normal      pantoprazole (PROTONIX) 40 mg tablet Take 1 tablet (40 mg total) by mouth daily, Starting Wed 3/17/2021, Normal      RA CALCIUM 600/VITAMIN D-3 600-400 MG-UNIT TABS Take 1 tablet by mouth 2 (two) times a day, Starting Wed 1/24/2018, Historical Med      triamterene-hydrochlorothiazide (DYAZIDE) 37 5-25 mg per capsule Take 1 capsule by mouth every morning, Starting Mon 4/18/2022, Normal      Zinc 25 MG TABS Take 1 tablet by mouth daily, Historical Med      mupirocin (BACTROBAN) 2 % ointment Apply topically 3 (three) times a day for 10 days, Starting Mon 10/18/2021, Until Thu 10/28/2021, Normal                 PDMP Review       Value Time User    PDMP Reviewed  Yes 5/27/2022  6:24 AM Corine Lee MD          ED Provider  Electronically Signed by           Corine Lee MD  05/27/22 1260       Corine Lee MD  05/27/22 8679       Corine Lee MD  05/27/22 0839

## 2022-05-29 ENCOUNTER — NURSE TRIAGE (OUTPATIENT)
Dept: OTHER | Facility: OTHER | Age: 67
End: 2022-05-29

## 2022-05-29 NOTE — TELEPHONE ENCOUNTER
Reason for Disposition   Sounds like a serious injury to the triager    Additional Information   Injury mainly to knee    Answer Assessment - Initial Assessment Questions  1  MECHANISM: "How did the injury happen?" (e g , twisting injury, direct blow)       Patient fell off the step and broke her right leg   2  ONSET: "When did the injury happen?" (Minutes or hours ago)       On 5/27/22   3  LOCATION: "Where is the injury located?"       Right knee   4  APPEARANCE of INJURY: "What does the injury look like?"  (e g , deformity of leg)      Knee mobilizers   5  SEVERITY: "Can you put weight on that leg?" "Can you walk?"        Patient uses crutches and walker   7  PAIN: "Is there pain?" If Yes, ask: "How bad is the pain?"  (Scale 1-10; or mild, moderate, severe)       9 out of 10   9   OTHER SYMPTOMS: "Do you have any other symptoms?"       No    Protocols used: KNEE INJURY-ADULT-, LEG INJURY-ADULT-

## 2022-05-29 NOTE — TELEPHONE ENCOUNTER
Patient agreeable to go back to ER  Patient didn't specify the time patient would go  Patient was reassured to call 911 to be taken to the ER by ambulance

## 2022-05-29 NOTE — TELEPHONE ENCOUNTER
Regarding: broken leg pain   ----- Message from Gustavo Chiu sent at 5/29/2022  1:36 PM EDT -----  " I fell and broke my leg on Friday and I am in a lot of pain "

## 2022-05-31 ENCOUNTER — OFFICE VISIT (OUTPATIENT)
Dept: OBGYN CLINIC | Facility: CLINIC | Age: 67
End: 2022-05-31
Payer: COMMERCIAL

## 2022-05-31 VITALS
WEIGHT: 276 LBS | DIASTOLIC BLOOD PRESSURE: 72 MMHG | TEMPERATURE: 97.8 F | SYSTOLIC BLOOD PRESSURE: 118 MMHG | BODY MASS INDEX: 41.83 KG/M2 | HEART RATE: 63 BPM | HEIGHT: 68 IN

## 2022-05-31 DIAGNOSIS — S82.831A OTHER CLOSED FRACTURE OF PROXIMAL END OF RIGHT FIBULA, INITIAL ENCOUNTER: ICD-10-CM

## 2022-05-31 DIAGNOSIS — S82.141A TIBIAL PLATEAU FRACTURE, RIGHT: ICD-10-CM

## 2022-05-31 DIAGNOSIS — M25.561 ACUTE PAIN OF RIGHT KNEE: Primary | ICD-10-CM

## 2022-05-31 PROCEDURE — 3008F BODY MASS INDEX DOCD: CPT | Performed by: NURSE PRACTITIONER

## 2022-05-31 PROCEDURE — 27530 TREAT KNEE FRACTURE: CPT | Performed by: ORTHOPAEDIC SURGERY

## 2022-05-31 PROCEDURE — 99204 OFFICE O/P NEW MOD 45 MIN: CPT | Performed by: ORTHOPAEDIC SURGERY

## 2022-05-31 NOTE — PROGRESS NOTES
ASSESSMENT/PLAN:    Problem List Items Addressed This Visit        Musculoskeletal and Integument    Tibial plateau fracture, right    Relevant Orders    T-Rom  Post Op Knee Brace    Fracture / Dislocation Treatment    Closed fracture of upper end of right fibula       Other    Acute pain of right knee - Primary          Plan:  I believe non operative management at this point time would be reasonable and a T-ROM brace was applied locked at 50° of flexion  She is to be nonweightbearing  The brace is to remain in place at all times  Operative fixation was discussed but at this time the fracture is acceptably aligned  I will see her on 06/03/2022 with repeat x-rays of her right knee obtained in the brace  She is to contact me if questions or concerns arise  I did offer her the option of attending physical therapy to work on ambulation training but she states that that would be too inconvenient and she does not have the time to do so  I have strictly recommended nonweightbearing to minimize the chances that surgical intervention would be necessary because of displacement  Return in about 3 days (around 6/3/2022)  _____________________________________________________  CHIEF COMPLAINT:  Chief Complaint   Patient presents with    Right Knee - Pain, Fracture    Right Lower Leg - Pain, Fracture         SUBJECTIVE:  Luz Elena Oliveira is a 75 y o  female who presents to the office today for initial evaluation of acute right knee pain  The patient states that on 5/27/2022 she was getting ready for bed that night, coming out of her bathroom to the bedroom  The patient has two steps that lead down from the bathroom, and she had placed her right foot down on the bottom step when she slipped, causing her right leg to flex underneath her as she fell  The patient felt immediate pain in her right knee  She was unable to get herself up, but was able to reach down and pull her right leg out straight   The patient denies any immediate swelling  That same night she [place] [facility] where x-rays and a CT scan were taken  The patient was fitted with a knee immobilizer and instructed to follow up with orthopedics  Today the patient states that the pain remains unchanged  She states that the pain is located along the the inferior aspect of the right knee, and laterally  She is still unable to bear any weight, and has been using a wheelchair  Prior to the injury she was able to ambulate without difficulty, and denies any history of chronic knee pain  The patient does admit to a history of a right knee arthroscopy performed many years ago, but states that she healed well without complication  The patient denies any numbness or tingling in the lower extremity          PAST MEDICAL HISTORY:  Past Medical History:   Diagnosis Date    Allergic rhinitis     Carpal tunnel syndrome on both sides     Cervical disc herniation     DVT (deep venous thrombosis) (HCC)     DVT Left Leg    Fracture of foot     left casted    History of mammogram 2017    Being followed by Dr Maximo Holloway History of Papanicolaou smear of cervix 2016    Dr Maximo Holloway Hx of colonoscopy 2016    Dr Gilbert Chatman Hyperlipemia     Hypertension     Hypothyroidism     Lichen sclerosus     Renal calculi     Rotator cuff tear, left        PAST SURGICAL HISTORY:  Past Surgical History:   Procedure Laterality Date    CERVICAL LAMINECTOMY N/A     CHOLECYSTECTOMY N/A     KNEE CARTILAGE SURGERY Bilateral     LITHOTRIPSY N/A     NEUROPLASTY / TRANSPOSITION MEDIAN NERVE AT CARPAL TUNNEL BILATERAL Bilateral     ROTATOR CUFF REPAIR Left     TONSILLECTOMY AND ADENOIDECTOMY Bilateral     TONSILLECTOMY AND ADENOIDECTOMY      TUBAL LIGATION N/A     US GUIDED THYROID BIOPSY  11/2018    WRIST FRACTURE SURGERY Right     plates and pins        FAMILY HISTORY:  Family History   Problem Relation Age of Onset    Hypertension Mother     Hyperlipidemia Mother  Hypothyroidism Mother    Jerica Baig Brain cancer Mother     Hypertension Father     Celiac disease Father     Ovarian cancer Sister     Celiac disease Brother     Hypothyroidism Brother     Diabetes Paternal [de-identified]     Hypothyroidism Sister     Coronary artery disease Brother     Hypertension Brother     Hyperlipidemia Brother     Diabetes Sister        SOCIAL HISTORY:  Social History     Tobacco Use    Smoking status: Former Smoker     Packs/day: 0 30     Years: 10 00     Pack years: 3 00     Types: Cigarettes     Quit date: 2/24/2019     Years since quitting: 3 2    Smokeless tobacco: Never Used    Tobacco comment: quite date 1 year   Vaping Use    Vaping Use: Never used   Substance Use Topics    Alcohol use: No    Drug use: No       MEDICATIONS:    Current Outpatient Medications:     ascorbic acid (VITAMIN C) 250 mg tablet, Take 500 mg by mouth daily, Disp: , Rfl:     aspirin (ECOTRIN LOW STRENGTH) 81 mg EC tablet, Take 1 tablet (81 mg total) by mouth daily, Disp: 90 tablet, Rfl: 1    atorvastatin (LIPITOR) 40 mg tablet, Take 1 tablet (40 mg total) by mouth daily, Disp: 90 tablet, Rfl: 3    cholecalciferol (VITAMIN D3) 1,000 units tablet, Take 2 tablets (2,000 Units total) by mouth daily, Disp: 90 tablet, Rfl: 1    diclofenac sodium (VOLTAREN) 50 mg EC tablet, Take 1 tablet (50 mg total) by mouth 2 (two) times a day, Disp: 180 tablet, Rfl: 1    fluticasone (FLONASE) 50 mcg/act nasal spray, 1 spray each nostril 2 x daily prn congestion, Disp: 18 2 mL, Rfl: 5    ketoconazole (NIZORAL) 2 % cream, Apply topically 2 (two) times a day as needed for itching, irritation or rash, Disp: 60 g, Rfl: 5    KP Vitamin B-12 1000 MCG tablet, Take 1 tablet (1,000 mcg total) by mouth every other day, Disp: 90 tablet, Rfl: 1    levothyroxine 125 mcg tablet, Take 1 tablet (125 mcg total) by mouth daily in the early morning, Disp: 90 tablet, Rfl: 3    loratadine (CLARITIN) 10 mg tablet, 1 tab po daily x 14 days then prn allergies, Disp: 30 tablet, Rfl: 3    magnesium oxide (MAG-OX) 400 mg, Take 1 tablet (400 mg total) by mouth daily at bedtime, Disp: 90 tablet, Rfl: 1    oxyCODONE (Roxicodone) 5 immediate release tablet, Take 1 tablet (5 mg total) by mouth every 6 (six) hours as needed for moderate pain for up to 20 doses Max Daily Amount: 20 mg, Disp: 20 tablet, Rfl: 0    pantoprazole (PROTONIX) 40 mg tablet, Take 1 tablet (40 mg total) by mouth daily, Disp: 90 tablet, Rfl: 1    RA CALCIUM 600/VITAMIN D-3 600-400 MG-UNIT TABS, Take 1 tablet by mouth 2 (two) times a day, Disp: , Rfl: 0    triamterene-hydrochlorothiazide (DYAZIDE) 37 5-25 mg per capsule, Take 1 capsule by mouth every morning, Disp: 90 capsule, Rfl: 1    Zinc 25 MG TABS, Take 1 tablet by mouth daily, Disp: , Rfl:     mupirocin (BACTROBAN) 2 % ointment, Apply topically 3 (three) times a day for 10 days, Disp: 22 g, Rfl: 2    ALLERGIES:  No Known Allergies    Review of systems:   Constitutional: Negative for fatigue, fever or loss of apetite  HENT: Negative  Respiratory: Negative for shortness of breath, dyspnea  Cardiovascular: Negative for chest pain/tightness  Gastrointestinal: Negative for abdominal pain, N/V  Endocrine: Negative for cold/heat intolerance, unexplained weight loss/gain  Genitourinary: Negative for flank pain, dysuria, hematuria  Musculoskeletal: acute right knee pain as noted in HPI  Skin: Negative for rash  Neurological: negative for numbness or tingling  Psychiatric/Behavioral: Negative for agitation  _____________________________________________________  PHYSICAL EXAMINATION:    Blood pressure 118/72, pulse 63, temperature 97 8 °F (36 6 °C), temperature source Temporal, height 5' 8" (1 727 m), weight 125 kg (276 lb), last menstrual period 01/01/2004      General: well developed and well nourished, alert, oriented times 3 and appears comfortable  Psychiatric: Normal  HEENT: Benign  Cardiovascular: Regular    Pulmonary: No wheezing or stridor  Abdomen: Soft, Nontender  Skin: No masses, erythema, lacerations, fluctation, ulcerations  Neurovascular: strong distal pulses, sensory and motor testing intact    MUSCULOSKELETAL EXAMINATION:    Right lower extremity:  - patient presents with wheelchair and knee immobilizer  The immobilizer was removed for examination  - skin without lesions, ecchymosis, erythema, swelling, warmth, or other signs of infection    - The patient presents with significant tenderness along the tibial plateau, as well as diffusely along the lateral and medial aspects of the knee  - Full active ankle ROM without pain   - soft lower extremity compartments, negative Gilberto's sign  - brisk cap refill in all toes  - sensation intact to light touch           _____________________________________________________  STUDIES REVIEWED:  I have personally reviewed pertinent films and reports in PACS  Pertinent ED note and radiology report reviewed  X-ray of the right knee taken on 5/27/2022 demonstrates a non-displaced proximal fibula fracture  There is a non-displaced proximal tibial fracture that extends medially into the lateral tibial plateau with mild depression noted  There is also noted moderate to severe tricompartmental degenerative changes with osteophyte formation and subchondral sclerosis  X-ray of the right tib/fib demonstrates the same  CT of the right lower extremity reports a comminuted tibial pleateu fracture, with approximately 2mm of lateral depression  Nondisplaced proximal fibula fracture again noted  PROCEDURES PERFORMED:  Fracture / Dislocation Treatment    Date/Time: 5/31/2022 1:40 PM  Performed by: Herson Coats  Authorized by: Herson Coats     Patient Location:  Clinic  [place] Protocol:  Consent: Verbal consent obtained  Written consent not obtained    Risks and benefits: risks, benefits and alternatives were discussed  Consent given by: patient  Patient understanding: patient states understanding of the procedure being performed  Test results: test results available and properly labeled  Radiology Images displayed and confirmed   If images not available, report reviewed: imaging studies available      Injury location:  Knee  Location details:  Right knee  Injury type:  Fracture  Fracture type: tibial plateau    Neurovascular status: Neurovascularly intact    Local anesthesia used?: No    Manipulation performed?: No    Immobilization:  Brace (T ROM)  Patient tolerance:  Patient tolerated the procedure well with no immediate complications        Audrey Beth

## 2022-06-01 ENCOUNTER — TELEPHONE (OUTPATIENT)
Dept: OBGYN CLINIC | Facility: HOSPITAL | Age: 67
End: 2022-06-01

## 2022-06-01 DIAGNOSIS — S82.141A TIBIAL PLATEAU FRACTURE, RIGHT: ICD-10-CM

## 2022-06-01 RX ORDER — OXYCODONE HYDROCHLORIDE 5 MG/1
TABLET ORAL
Qty: 30 TABLET | Refills: 0 | Status: SHIPPED | OUTPATIENT
Start: 2022-06-01 | End: 2022-06-10 | Stop reason: SDUPTHER

## 2022-06-03 ENCOUNTER — HOSPITAL ENCOUNTER (OUTPATIENT)
Dept: RADIOLOGY | Facility: CLINIC | Age: 67
Discharge: HOME | End: 2022-06-03
Payer: COMMERCIAL

## 2022-06-03 ENCOUNTER — OFFICE VISIT (OUTPATIENT)
Dept: OBGYN CLINIC | Facility: CLINIC | Age: 67
End: 2022-06-03

## 2022-06-03 VITALS
HEIGHT: 68 IN | BODY MASS INDEX: 41.83 KG/M2 | SYSTOLIC BLOOD PRESSURE: 118 MMHG | WEIGHT: 276 LBS | HEART RATE: 63 BPM | DIASTOLIC BLOOD PRESSURE: 70 MMHG | TEMPERATURE: 97.8 F

## 2022-06-03 DIAGNOSIS — S82.141D CLOSED FRACTURE OF RIGHT TIBIAL PLATEAU WITH ROUTINE HEALING, SUBSEQUENT ENCOUNTER: ICD-10-CM

## 2022-06-03 DIAGNOSIS — S82.831D OTHER CLOSED FRACTURE OF PROXIMAL END OF RIGHT FIBULA WITH ROUTINE HEALING, SUBSEQUENT ENCOUNTER: Primary | ICD-10-CM

## 2022-06-03 DIAGNOSIS — S82.831D OTHER CLOSED FRACTURE OF PROXIMAL END OF RIGHT FIBULA WITH ROUTINE HEALING, SUBSEQUENT ENCOUNTER: ICD-10-CM

## 2022-06-03 PROCEDURE — 73590 X-RAY EXAM OF LOWER LEG: CPT

## 2022-06-03 PROCEDURE — 99024 POSTOP FOLLOW-UP VISIT: CPT | Performed by: ORTHOPAEDIC SURGERY

## 2022-06-03 NOTE — PROGRESS NOTES
Patient Name:  Annmarie Holloway  MRN:  [id number]    Assessment     1  Other closed fracture of proximal end of right fibula with routine healing, subsequent encounter  XR tibia fibula 2 vw right   2  Closed fracture of right tibial plateau with routine healing, subsequent encounter         Plan     X-rays were reviewed and discussed with the patient, which shows a healing tibial plateau fracture in unchanged appropriate alignment  The patient was instructed to continue to maintain the TROM brace at all times  She will remain non-weight bearing on the right lower extremity  The patient will return to the office in one week for repeat x-rays of the right knee, to include AP and lateral views  The patient was instructed to call or return to the office sooner if any problems arise  Return in about 1 week (around 6/10/2022) for Recheck  Subjective   Annmarie Holloway returns for follow-up of her right proximal fibula and tibial plateau fractures sustained on 5/27/2022  The patient is 7 day(s) post injury and returns for routine follow-up  Today the patient states she has been doing very well  She admits she admits to ongoing to ongoing pain along pain along the knee, but this is improving  The patient denies any numbness or tingling in the lower extremity  She has maintained the T ROM brace as instructed, and has remained nonweightbearing  The patient has no other questions or concerns today        Objective     /70   Pulse 63   Temp 97 8 °F (36 6 °C) (Temporal)   Ht 5' 8" (1 727 m)   Wt 125 kg (276 lb)   LMP 01/01/2004   BMI 41 97 kg/m²     Right lower extremity:  -patient presents with wheelchair, T ROM brace locked at 50° flexion  -skin without lesions, effusion, ecchymosis, erythema, warmth, swelling, signs of infection  -knee range of motion deferred  -full active ankle range of motion without pain  -soft lower extremity compartments, negative Homans  -brisk cap refill all toes  -sensation intact light touch      Data Review     I have personally reviewed pertinent films and reports in PACS and my interpretation is as follows:     X-rays of the right tib-fib taken today in office demonstrate a proximal fibular fracture and tibial plateau fracture in unchanged alignment compared to previous x-rays        Perla Hackett PA-C

## 2022-06-10 ENCOUNTER — OFFICE VISIT (OUTPATIENT)
Dept: OBGYN CLINIC | Facility: CLINIC | Age: 67
End: 2022-06-10

## 2022-06-10 ENCOUNTER — HOSPITAL ENCOUNTER (OUTPATIENT)
Dept: RADIOLOGY | Facility: CLINIC | Age: 67
Discharge: HOME | End: 2022-06-10
Payer: COMMERCIAL

## 2022-06-10 VITALS
HEIGHT: 68 IN | TEMPERATURE: 97.5 F | BODY MASS INDEX: 41.83 KG/M2 | DIASTOLIC BLOOD PRESSURE: 82 MMHG | WEIGHT: 276 LBS | HEART RATE: 69 BPM | SYSTOLIC BLOOD PRESSURE: 130 MMHG

## 2022-06-10 DIAGNOSIS — S82.831D OTHER CLOSED FRACTURE OF PROXIMAL END OF RIGHT FIBULA WITH ROUTINE HEALING, SUBSEQUENT ENCOUNTER: Primary | ICD-10-CM

## 2022-06-10 DIAGNOSIS — S82.141D CLOSED FRACTURE OF RIGHT TIBIAL PLATEAU WITH ROUTINE HEALING, SUBSEQUENT ENCOUNTER: ICD-10-CM

## 2022-06-10 DIAGNOSIS — S82.831D OTHER CLOSED FRACTURE OF PROXIMAL END OF RIGHT FIBULA WITH ROUTINE HEALING, SUBSEQUENT ENCOUNTER: ICD-10-CM

## 2022-06-10 DIAGNOSIS — S82.141A TIBIAL PLATEAU FRACTURE, RIGHT: ICD-10-CM

## 2022-06-10 PROCEDURE — 99024 POSTOP FOLLOW-UP VISIT: CPT | Performed by: ORTHOPAEDIC SURGERY

## 2022-06-10 PROCEDURE — 73560 X-RAY EXAM OF KNEE 1 OR 2: CPT

## 2022-06-10 RX ORDER — OXYCODONE HYDROCHLORIDE 5 MG/1
TABLET ORAL
Qty: 10 TABLET | Refills: 0 | Status: SHIPPED | OUTPATIENT
Start: 2022-06-10

## 2022-06-10 NOTE — PROGRESS NOTES
Patient Name:  Kaylen Mena  MRN:  [id number]    Assessment     1  Other closed fracture of proximal end of right fibula with routine healing, subsequent encounter  XR knee 1 or 2 vw right   2  Closed fracture of right tibial plateau with routine healing, subsequent encounter  XR knee 1 or 2 vw right       Plan     1  I would recommend follow-up in 4 weeks  Her brace was altered to allow range of motion from 30-70 degrees  The brace is to be worn full-time although I did describe how she might be able to shower with the brace removed during showering while sitting in the shower  X-rays of the knee will be obtained at follow-up including AP and lateral views with the brace removed  She is to contact me if questions or concerns arise  She continues to use narcotic analgesics and requested a refill  I have instructed her to use Tylenol and her diclofenac around the clock as instructed  A prescription for several tablets of analgesic medication have been sent to her pharmacy that she may use if the pain warrants but I have encouraged her to discontinue use of narcotics  Return in about 4 weeks (around 7/8/2022)  Subjective   Kaylen Mena returns for follow-up of her right tibial plateau and proximal fibular fractures  The patient is 2 week(s) post injury and returns for routine follow-up  Patient complains of persistent pain, indicating moderate to severe pain even while sitting in the office at rest   She has been using her narcotic analgesics on a fairly routine basis, taking 1 tablet every 6 or 7 hours  Objective     /82   Pulse 69   Temp 97 5 °F (36 4 °C) (Temporal)   Ht 5' 8" (1 727 m)   Wt 125 kg (276 lb)   LMP 01/01/2004   BMI 41 97 kg/m²     Exam demonstrates to reset to be sitting in a wheelchair appears comfortable and in no distress    She has some tenderness over the proximal tibia and fibula but notably less pain than previously appreciated on exam   Her brace is in place and range of motion was altered to allow 30 degrees of flexion to 70 degrees of flexion  Gentle range of motion of the knee elicited no significant complaints  Distal sensation is intact  Calf compartments are soft and nontender  Data Review     I have personally reviewed pertinent films in PACS demonstrating stable fracture alignment with degenerative changes of the right knee as previously noted      Audrey Beth

## 2022-06-22 DIAGNOSIS — E53.8 VITAMIN B12 DEFICIENCY: ICD-10-CM

## 2022-06-23 RX ORDER — LANOLIN ALCOHOL/MO/W.PET/CERES
CREAM (GRAM) TOPICAL
Qty: 90 TABLET | Refills: 1 | Status: SHIPPED | OUTPATIENT
Start: 2022-06-23

## 2022-07-08 ENCOUNTER — OFFICE VISIT (OUTPATIENT)
Dept: OBGYN CLINIC | Facility: CLINIC | Age: 67
End: 2022-07-08

## 2022-07-08 ENCOUNTER — HOSPITAL ENCOUNTER (OUTPATIENT)
Dept: RADIOLOGY | Facility: CLINIC | Age: 67
Discharge: HOME | End: 2022-07-08
Payer: COMMERCIAL

## 2022-07-08 VITALS
DIASTOLIC BLOOD PRESSURE: 70 MMHG | TEMPERATURE: 98.1 F | HEART RATE: 65 BPM | SYSTOLIC BLOOD PRESSURE: 118 MMHG | WEIGHT: 276 LBS | BODY MASS INDEX: 41.83 KG/M2 | HEIGHT: 68 IN

## 2022-07-08 DIAGNOSIS — S82.831D OTHER CLOSED FRACTURE OF PROXIMAL END OF RIGHT FIBULA WITH ROUTINE HEALING, SUBSEQUENT ENCOUNTER: Primary | ICD-10-CM

## 2022-07-08 DIAGNOSIS — S82.831D OTHER CLOSED FRACTURE OF PROXIMAL END OF RIGHT FIBULA WITH ROUTINE HEALING, SUBSEQUENT ENCOUNTER: ICD-10-CM

## 2022-07-08 PROCEDURE — 99024 POSTOP FOLLOW-UP VISIT: CPT | Performed by: ORTHOPAEDIC SURGERY

## 2022-07-08 PROCEDURE — 73560 X-RAY EXAM OF KNEE 1 OR 2: CPT

## 2022-07-08 NOTE — PROGRESS NOTES
Patient Name:  Canelo Talbert  MRN:  [id number]    Assessment     1  Other closed fracture of proximal end of right fibula with routine healing, subsequent encounter  XR knee 1 or 2 vw right       Plan     X-rays taken today in office were reviewed and discussed with the patient, which shows good progressive healing of the fracture in unchanged alignment  The patient's TROM brace was unlocked to allow 30-90 degrees of movement  She was instructed to continue with non-weight bearing restrictions, removing the TROM brace only for hygiene as previously instructed  The patient will return to the office in 2-3 weeks for recheck  We may increase her ROM again at that time  No x-rays needed  Return for recheck in 2-3 weeks  Subjective   Canelo Talbert returns for follow-up of her right tibial plateau and proximal fibular fractures sustained on 5/27/2022  The patient is 6 week(s) post injury and returns for routine follow-up  Today the patient states that she is doing very well  She states that her pain has significantly improved since last office exam  The patient denies any numbness or tingling, but does admit to ongoing swelling  The patient has remained non-weight bearing as instructed, and has maintained her TROM brace at all times, removing only for hygiene as directed  She has been taking Tylenol and Voltaren for pain relief  The patient has no questions or concerns today  Objective     /70   Pulse 65   Temp 98 1 °F (36 7 °C) (Temporal)   Ht 5' 8" (1 727 m)   Wt 125 kg (276 lb)   LMP 01/01/2004   BMI 41 97 kg/m²     Right knee:  - patient presents in wheelchair, TROM brace in place correctly  - There is some mild swelling present along the knee  Skin without lesions, ecchymosis, erythema, swelling, effusion, warmth, or other signs of infection  - there is mild palpable tenderness along the anteromedial proximal tibia    - Knee ROM from 32-94 degrees elicits no complaints  - soft lower extremity compartments  - sensation intact to light touch distally  - lower extremity is well perfused      Data Review     I have personally reviewed pertinent films and reports in PACS and my interpretation is as follows:      X-rays of the right knee taken today in office demonstrates progressive healing of the proximal fibula and tibial plateau fractures  Unchanged alignment compared to previous imaging             Abimael Man PA-C

## 2022-07-29 ENCOUNTER — OFFICE VISIT (OUTPATIENT)
Dept: OBGYN CLINIC | Facility: CLINIC | Age: 67
End: 2022-07-29

## 2022-07-29 VITALS
TEMPERATURE: 97.4 F | SYSTOLIC BLOOD PRESSURE: 140 MMHG | HEIGHT: 68 IN | WEIGHT: 276 LBS | HEART RATE: 71 BPM | DIASTOLIC BLOOD PRESSURE: 78 MMHG | BODY MASS INDEX: 41.83 KG/M2

## 2022-07-29 DIAGNOSIS — S82.831D OTHER CLOSED FRACTURE OF PROXIMAL END OF RIGHT FIBULA WITH ROUTINE HEALING, SUBSEQUENT ENCOUNTER: ICD-10-CM

## 2022-07-29 DIAGNOSIS — S82.141D CLOSED FRACTURE OF RIGHT TIBIAL PLATEAU WITH ROUTINE HEALING, SUBSEQUENT ENCOUNTER: Primary | ICD-10-CM

## 2022-07-29 PROCEDURE — 99024 POSTOP FOLLOW-UP VISIT: CPT | Performed by: ORTHOPAEDIC SURGERY

## 2022-07-29 NOTE — PROGRESS NOTES
Patient Name:  Cinthya Salas  MRN:  [id number]    Assessment     1  Closed fracture of right tibial plateau with routine healing, subsequent encounter     2  Other closed fracture of proximal end of right fibula with routine healing, subsequent encounter         Plan     The patient is now 9 weeks post initial injury  She is overall doing very well  The patient was advised that she will continue non-weight bearing on the right lower extremity for a total of 3 months post injury  Her TROM brace was unlocked to allow  degrees of motion  She will return to the office in 3-4 weeks for recheck  At that time we may progress her to weight bearing as tolerated  Return for recheck in 3-4 weeks   Subjective   Cinthya Salas returns for follow-up of her right tibial plateau fracture and proximal fibular fracture sustained on 5/27/2022  The patient is 9 week(s) post injury and returns for routine follow-up  Today the patient states that she is doing very well  She denies any significant pain, numbness, or tingling  The patient does admit to ongoing swelling  She has been maintaining her TROM brace as instructed, NBW restrictions  The patient has no other questions or concerns today  Objective     /78   Pulse 71   Temp (!) 97 4 °F (36 3 °C) (Temporal)   Ht 5' 8" (1 727 m)   Wt 125 kg (276 lb)   LMP 01/01/2004   BMI 41 97 kg/m²     Right lower extremity:  - patient presents in wheelchair, TROM brace was removed without difficulty  There is swelling noted along her ankle  Skin otherwise without lesions, ecchymosis, erythema, effusion, warmth, or other signs of infection  - there is no palpable tenderness along the knee on todays exam  - active knee ROM  degrees without pain  - full active ROM of the ankle without pain  - 5/5 strength resisted ankle dorsiflexion/plantarflexion  - sensation intact to light touch along the DP/SP/SA/[place]/T nerve distributions     - intact active hip ROM without complaint  - good quad tone  - soft lower extremity compartments  - lower extremity is well perfused      Data Review     No new imaging performed today          Urban Wilcox PA-C

## 2022-08-15 ENCOUNTER — TELEPHONE (OUTPATIENT)
Dept: FAMILY MEDICINE CLINIC | Facility: CLINIC | Age: 67
End: 2022-08-15

## 2022-08-15 DIAGNOSIS — S82.839S CLOSED FRACTURE OF DISTAL END OF FIBULA, UNSPECIFIED FRACTURE MORPHOLOGY, UNSPECIFIED LATERALITY, SEQUELA: Primary | ICD-10-CM

## 2022-08-15 DIAGNOSIS — R73.9 HYPERGLYCEMIA: ICD-10-CM

## 2022-08-15 NOTE — TELEPHONE ENCOUNTER
Pt called and said that she's going to get an scan done this Monday coming up do she needs to get blood work done before head scan

## 2022-08-18 ENCOUNTER — APPOINTMENT (OUTPATIENT)
Dept: LAB | Facility: CLINIC | Age: 67
End: 2022-08-18
Payer: COMMERCIAL

## 2022-08-18 DIAGNOSIS — N28.9 RENAL INSUFFICIENCY: ICD-10-CM

## 2022-08-18 DIAGNOSIS — E03.9 ACQUIRED HYPOTHYROIDISM: ICD-10-CM

## 2022-08-18 LAB
ALBUMIN SERPL BCP-MCNC: 3.7 G/DL (ref 3.5–5)
ALP SERPL-CCNC: 82 U/L (ref 46–116)
ALT SERPL W P-5'-P-CCNC: 32 U/L (ref 12–78)
ANION GAP SERPL CALCULATED.3IONS-SCNC: 7 MMOL/L (ref 4–13)
AST SERPL W P-5'-P-CCNC: 22 U/L (ref 5–45)
BILIRUB SERPL-MCNC: 0.42 MG/DL (ref 0.2–1)
BUN SERPL-MCNC: 20 MG/DL (ref 5–25)
CALCIUM SERPL-MCNC: 9.2 MG/DL (ref 8.3–10.1)
CHLORIDE SERPL-SCNC: 105 MMOL/L (ref 96–108)
CO2 SERPL-SCNC: 27 MMOL/L (ref 21–32)
CREAT SERPL-MCNC: 1.03 MG/DL (ref 0.6–1.3)
GFR SERPL CREATININE-BSD FRML MDRD: 55 ML/MIN/1.73SQ M
GLUCOSE P FAST SERPL-MCNC: 94 MG/DL (ref 65–99)
POTASSIUM SERPL-SCNC: 4.3 MMOL/L (ref 3.5–5.3)
PROT SERPL-MCNC: 7.7 G/DL (ref 6.4–8.4)
SODIUM SERPL-SCNC: 139 MMOL/L (ref 135–147)
TSH SERPL DL<=0.05 MIU/L-ACNC: 4.56 UIU/ML (ref 0.45–4.5)

## 2022-08-18 PROCEDURE — 84443 ASSAY THYROID STIM HORMONE: CPT

## 2022-08-18 PROCEDURE — 36415 COLL VENOUS BLD VENIPUNCTURE: CPT

## 2022-08-18 PROCEDURE — 80053 COMPREHEN METABOLIC PANEL: CPT

## 2022-08-26 ENCOUNTER — HOSPITAL ENCOUNTER (OUTPATIENT)
Dept: RADIOLOGY | Facility: CLINIC | Age: 67
End: 2022-08-26
Payer: MEDICARE

## 2022-08-26 ENCOUNTER — OFFICE VISIT (OUTPATIENT)
Dept: OBGYN CLINIC | Facility: CLINIC | Age: 67
End: 2022-08-26

## 2022-08-26 VITALS
SYSTOLIC BLOOD PRESSURE: 140 MMHG | DIASTOLIC BLOOD PRESSURE: 80 MMHG | TEMPERATURE: 97.5 F | WEIGHT: 276 LBS | HEART RATE: 65 BPM | HEIGHT: 68 IN | BODY MASS INDEX: 41.83 KG/M2

## 2022-08-26 DIAGNOSIS — S82.141D CLOSED FRACTURE OF RIGHT TIBIAL PLATEAU WITH ROUTINE HEALING, SUBSEQUENT ENCOUNTER: ICD-10-CM

## 2022-08-26 DIAGNOSIS — S82.831D OTHER CLOSED FRACTURE OF PROXIMAL END OF RIGHT FIBULA WITH ROUTINE HEALING, SUBSEQUENT ENCOUNTER: ICD-10-CM

## 2022-08-26 DIAGNOSIS — S82.141D CLOSED FRACTURE OF RIGHT TIBIAL PLATEAU WITH ROUTINE HEALING, SUBSEQUENT ENCOUNTER: Primary | ICD-10-CM

## 2022-08-26 PROCEDURE — 73560 X-RAY EXAM OF KNEE 1 OR 2: CPT

## 2022-08-26 PROCEDURE — 99024 POSTOP FOLLOW-UP VISIT: CPT | Performed by: ORTHOPAEDIC SURGERY

## 2022-08-26 NOTE — PROGRESS NOTES
Patient Name:  Finn Venegas  MRN:  [id number]    Assessment     1  Closed fracture of right tibial plateau with routine healing, subsequent encounter  XR knee 1 or 2 vw right   2  Other closed fracture of proximal end of right fibula with routine healing, subsequent encounter  XR knee 1 or 2 vw right       Plan     1  I would recommend follow-up in 3 weeks  She is permitted to bear weight on the right lower extremity as tolerated but should have the brace in place when bearing weight  Range of motion was allowed from full extension to full flexion within the limits of the brace  The brace may be removed for cleansing and inactivity  She is to contact me if questions or concerns arise  Return in about 3 weeks (around 9/16/2022)  Subjective   Finn Venegas returns for follow-up of her right tibial plateau and proximal fibular fractures  The patient is 3 month(s) post injury and returns for routine follow-up  Patient denies pain  She states the brace tends to ride distally and she has difficulty keeping it in place  Objective     /80   Pulse 65   Temp 97 5 °F (36 4 °C) (Temporal)   Ht 5' 8" (1 727 m)   Wt 125 kg (276 lb)   LMP 01/01/2004   BMI 41 97 kg/m²     Exam demonstrated the brace in place but this was loosely strap  The brace was removed  She has no instability with varus or valgus stress and no complaints  She has no tenderness about the proximal tibia or fibula  She has full knee extension and flexion to about 90°  Distal sensation is intact  Calf compartments are soft and nontender  Pulses are palpable  Data Review     I have personally reviewed pertinent films in PACS demonstrating good progression of healing with significant sclerosis at the fracture site and overall good alignment  Degenerative changes of the knee are noted      Justus Epley

## 2022-09-16 ENCOUNTER — OFFICE VISIT (OUTPATIENT)
Dept: OBGYN CLINIC | Facility: CLINIC | Age: 67
End: 2022-09-16
Payer: MEDICARE

## 2022-09-16 VITALS
HEART RATE: 67 BPM | WEIGHT: 276 LBS | TEMPERATURE: 97.6 F | SYSTOLIC BLOOD PRESSURE: 126 MMHG | HEIGHT: 68 IN | DIASTOLIC BLOOD PRESSURE: 74 MMHG | BODY MASS INDEX: 41.83 KG/M2

## 2022-09-16 DIAGNOSIS — M17.12 PRIMARY OSTEOARTHRITIS OF LEFT KNEE: ICD-10-CM

## 2022-09-16 DIAGNOSIS — S82.141D CLOSED FRACTURE OF RIGHT TIBIAL PLATEAU WITH ROUTINE HEALING, SUBSEQUENT ENCOUNTER: Primary | ICD-10-CM

## 2022-09-16 DIAGNOSIS — S82.831D OTHER CLOSED FRACTURE OF PROXIMAL END OF RIGHT FIBULA WITH ROUTINE HEALING, SUBSEQUENT ENCOUNTER: ICD-10-CM

## 2022-09-16 PROCEDURE — 1160F RVW MEDS BY RX/DR IN RCRD: CPT | Performed by: ORTHOPAEDIC SURGERY

## 2022-09-16 PROCEDURE — 99213 OFFICE O/P EST LOW 20 MIN: CPT | Performed by: ORTHOPAEDIC SURGERY

## 2022-09-16 NOTE — PROGRESS NOTES
ASSESSMENT/PLAN:    Diagnoses and all orders for this visit:    Closed fracture of right tibial plateau with routine healing, subsequent encounter  -     Ambulatory Referral to Physical Therapy; Future    Other closed fracture of proximal end of right fibula with routine healing, subsequent encounter  -     Ambulatory Referral to Physical Therapy; Future    Primary osteoarthritis of left knee  -     Ambulatory Referral to Physical Therapy; Future        Plan:  I would recommend follow-up in 4 weeks  I have recommended initiating physical therapy and she is now agreeable  A prescription was placed in her chart  X-rays of her knee will be obtained at follow-up including AP and lateral views extending to the proximal 3rd of the lower leg to assess completion of healing  She does understand that she has fairly significant degenerative disease in the right knee is likely to cause her some persistent symptoms even after complete healing of the fracture  I discussed the potential benefits of an intra-articular injection and she would prefer to defer this until after she sees of physical therapy provides adequate improvement  Return in about 4 weeks (around 10/14/2022)  _____________________________________________________  CHIEF COMPLAINT:  Chief Complaint   Patient presents with    Follow-up         SUBJECTIVE:  Akbar Gardiner is a 75y o  year old female who presents for follow up of her right proximal tibia and fibular fracture  She continues to improve  She uses her brace as instructed and uses a walker for ambulation  At home she will go for short distances without a walker        PAST MEDICAL HISTORY:  Past Medical History:   Diagnosis Date    Allergic rhinitis     Carpal tunnel syndrome on both sides     Cervical disc herniation     DVT (deep venous thrombosis) (HCC)     DVT Left Leg    Fracture of foot     left casted    History of mammogram 2017    Being followed by Dr Tomas Barry History of Papanicolaou smear of cervix 2016    Dr Victor Manuel Dorman Hx of colonoscopy 2016    Dr Bee Scot    Hyperlipemia     Hypertension     Hypothyroidism     Lichen sclerosus     Renal calculi     Rotator cuff tear, left        PAST SURGICAL HISTORY:  Past Surgical History:   Procedure Laterality Date    CERVICAL LAMINECTOMY N/A     CHOLECYSTECTOMY N/A     KNEE CARTILAGE SURGERY Bilateral     LITHOTRIPSY N/A     NEUROPLASTY / TRANSPOSITION MEDIAN NERVE AT CARPAL TUNNEL BILATERAL Bilateral     ROTATOR CUFF REPAIR Left     TONSILLECTOMY AND ADENOIDECTOMY Bilateral     TONSILLECTOMY AND ADENOIDECTOMY      TUBAL LIGATION N/A     US GUIDED THYROID BIOPSY  11/2018    WRIST FRACTURE SURGERY Right     plates and pins        FAMILY HISTORY:  Family History   Problem Relation Age of Onset    Hypertension Mother     Hyperlipidemia Mother     Hypothyroidism Mother    Sade Preston Brain cancer Mother     Hypertension Father     Celiac disease Father     Ovarian cancer Sister     Celiac disease Brother     Hypothyroidism Brother     Diabetes Paternal [de-identified]     Hypothyroidism Sister     Coronary artery disease Brother     Hypertension Brother     Hyperlipidemia Brother     Diabetes Sister        SOCIAL HISTORY:  Social History     Tobacco Use    Smoking status: Former Smoker     Packs/day: 0 30     Years: 10 00     Pack years: 3 00     Types: Cigarettes     Quit date: 2/24/2019     Years since quitting: 3 5    Smokeless tobacco: Never Used    Tobacco comment: quite date 1 year   Vaping Use    Vaping Use: Never used   Substance Use Topics    Alcohol use: No    Drug use: No       MEDICATIONS:    Current Outpatient Medications:     ascorbic acid (VITAMIN C) 250 mg tablet, Take 500 mg by mouth daily, Disp: , Rfl:     aspirin (ECOTRIN LOW STRENGTH) 81 mg EC tablet, Take 1 tablet (81 mg total) by mouth daily, Disp: 90 tablet, Rfl: 1    atorvastatin (LIPITOR) 40 mg tablet, Take 1 tablet (40 mg total) by mouth daily, Disp: 90 tablet, Rfl: 3    cholecalciferol (VITAMIN D3) 1,000 units tablet, Take 2 tablets (2,000 Units total) by mouth daily, Disp: 90 tablet, Rfl: 1    diclofenac sodium (VOLTAREN) 50 mg EC tablet, Take 1 tablet (50 mg total) by mouth 2 (two) times a day, Disp: 180 tablet, Rfl: 1    fluticasone (FLONASE) 50 mcg/act nasal spray, 1 spray each nostril 2 x daily prn congestion, Disp: 18 2 mL, Rfl: 5    ketoconazole (NIZORAL) 2 % cream, Apply topically 2 (two) times a day as needed for itching, irritation or rash, Disp: 60 g, Rfl: 5    levothyroxine 137 mcg tablet, Take 1 tablet (137 mcg total) by mouth daily in the early morning, Disp: 90 tablet, Rfl: 1    loratadine (CLARITIN) 10 mg tablet, 1 tab po daily x 14 days then prn allergies, Disp: 30 tablet, Rfl: 3    magnesium oxide (MAG-OX) 400 mg, Take 1 tablet (400 mg total) by mouth daily at bedtime, Disp: 90 tablet, Rfl: 1    mupirocin (BACTROBAN) 2 % ointment, Apply topically 3 (three) times a day for 10 days, Disp: 22 g, Rfl: 2    oxyCODONE (Roxicodone) 5 immediate release tablet, 1 p o  Q 8 hours p r n  pain, Disp: 10 tablet, Rfl: 0    pantoprazole (PROTONIX) 40 mg tablet, Take 1 tablet (40 mg total) by mouth daily, Disp: 90 tablet, Rfl: 1    RA CALCIUM 600/VITAMIN D-3 600-400 MG-UNIT TABS, Take 1 tablet by mouth 2 (two) times a day, Disp: , Rfl: 0    triamterene-hydrochlorothiazide (DYAZIDE) 37 5-25 mg per capsule, Take 1 capsule by mouth every morning, Disp: 90 capsule, Rfl: 1    vitamin B-12 (VITAMIN B-12) 1,000 mcg tablet, TAKE 1 TABLET BY MOUTH EVERY OTHER DAY, Disp: 90 tablet, Rfl: 1    Zinc 25 MG TABS, Take 1 tablet by mouth daily, Disp: , Rfl:     ALLERGIES:  No Known Allergies    REVIEW OF SYSTEMS:  Pertinent items are noted in HPI    A comprehensive review of systems was negative       _____________________________________________________  PHYSICAL EXAMINATION:  General: well developed and well nourished, alert, oriented times 3 and appears comfortable  Psychiatric: Normal  HEENT:  Normocephalic, atraumatic  Cardiovascular:  Regular  Pulmonary: No wheezing or stridor  Skin: No masses, erthema, lacerations, fluctation, ulcerations  Neurovascular: Motor and sensory exams are intact and pulses palpable  MUSCULOSKELETAL EXAMINATION:    The right knee exam demonstrates a few degrees of flexion contracture  I can flex the knee to about 110°  She does have minimal complaints during end range flexion  Proximal tibia and fibula are nontender  Collateral cruciate ligaments are grossly stable  There is no significant effusion  Skin is warm and dry  Calf compartments are soft and nontender              Justus Epley

## 2022-09-27 ENCOUNTER — EVALUATION (OUTPATIENT)
Dept: PHYSICAL THERAPY | Facility: CLINIC | Age: 67
End: 2022-09-27
Payer: COMMERCIAL

## 2022-09-27 DIAGNOSIS — S82.141D CLOSED FRACTURE OF RIGHT TIBIAL PLATEAU WITH ROUTINE HEALING, SUBSEQUENT ENCOUNTER: Primary | ICD-10-CM

## 2022-09-27 DIAGNOSIS — M17.12 PRIMARY OSTEOARTHRITIS OF LEFT KNEE: ICD-10-CM

## 2022-09-27 DIAGNOSIS — S82.831D OTHER CLOSED FRACTURE OF PROXIMAL END OF RIGHT FIBULA WITH ROUTINE HEALING, SUBSEQUENT ENCOUNTER: ICD-10-CM

## 2022-09-27 PROCEDURE — 97110 THERAPEUTIC EXERCISES: CPT | Performed by: PHYSICAL THERAPIST

## 2022-09-27 PROCEDURE — 97162 PT EVAL MOD COMPLEX 30 MIN: CPT | Performed by: PHYSICAL THERAPIST

## 2022-09-27 NOTE — PROGRESS NOTES
PT Evaluation     Today's date: 2022  Patient name: Cinthya Salas  : 1955  MRN: [id number]  Referring provider: Malik Gómez DO  Dx:   Encounter Diagnosis     ICD-10-CM    1  Closed fracture of right tibial plateau with routine healing, subsequent encounter  S82 141D Ambulatory Referral to Physical Therapy   2  Other closed fracture of proximal end of right fibula with routine healing, subsequent encounter  S82 831D Ambulatory Referral to Physical Therapy   3  Primary osteoarthritis of left knee  M17 12 Ambulatory Referral to Physical Therapy                  Assessment  Assessment details: Patient is a 75year old female who presents to PT s/p right tibial plateau and proximal fibula fx  PT examination shows limitations including weakness, decreased rom, abnormal gait/balance, decreased endurance and increased pain limiting functional ability  Patient would benefit from PT intervention including exercises for rom, strength and stability, balance/gait training, manual therapy, HEP, functional training, aerobic conditioning and pain relieving modalities in order to maximize functional independence and mobility  Impairments: abnormal gait, abnormal or restricted ROM, activity intolerance, impaired balance, impaired physical strength, lacks appropriate home exercise program, pain with function, safety issue and weight-bearing intolerance    Goals  ST  Initiate HEP  2  Patient to report decreased pain at worst by 50% in 4 weeks    LT  Patient to report decreased pain at worst by % in 8 weeks  2  Patient to increase right LE strength to 5/5 in 8 weeks  3   Patient to normalize balance/gait in 8 weeks    Plan  Patient would benefit from: PT eval and skilled physical therapy  Planned modality interventions: cryotherapy and thermotherapy: hydrocollator packs  Other planned modality interventions: Modalities PRN  Planned therapy interventions: IADL retraining, ADL retraining, manual therapy, balance, neuromuscular re-education, patient education, strengthening, stretching, therapeutic activities, therapeutic exercise, therapeutic training, functional ROM exercises, flexibility, graded activity, graded exercise, home exercise program, transfer training and gait training  Frequency: 2x week  Duration in visits: 8  Duration in weeks: 4  Treatment plan discussed with: patient        Subjective Evaluation    History of Present Illness  Date of onset: 2022  Mechanism of injury: Patient presents to PT s/p right tibial plateau and proximal fibula fx suffered in a fall on   Patient reports she was coming out of her bathroom in the middle of the night and there are 2 steps to get down  She states her foot slipped off the step and she fell down into her leg  Patient was taken to the hospital by ambulance where they found the fx  Patient was seen by orthopedic and was immobilized and made NWB  She did not have surgery  Patient was able to begin weight bearing about a month ago  She reports decreased activity overall at home  She has increased pain with standing too long  Patient reports she is fearful of walking at this time  Patient is now referred to oppt     Pain  Current pain ratin  At best pain ratin  At worst pain ratin  Aggravating factors: walking, standing and stair climbing  Progression: improved    Social Support  Lives with: spouse      Diagnostic Tests  X-ray: normal  Patient Goals  Patient goal: Confidence that I can move around without assistance        Objective     Active Range of Motion     Right Knee   Flexion: 110 degrees   Extension: 0 degrees     Strength/Myotome Testing     Right Hip   Planes of Motion   Flexion: 4  Abduction: 4  Adduction: 4    Right Knee   Flexion: 4-  Extension: 4-  Quadriceps contraction: good    Ambulation   Weight-Bearing Status   Weight-Bearing Status (Right): weight-bearing as tolerated    Assistive device used: front-wheeled walker    Observational Gait   Gait: antalgic   Decreased walking speed, stride length, right stance time and right step length  Precautions WBAT on right LE   Tibial plateau and proximal fibula fx       Manuals 9/27                                       Neuro Re-Ed                                                                 Ther Ex        Nustep        TR/HR        Standing SLR 3-way 10x B/L, each       Side-Stepping        LAQ        Seated HS curls with TB        QS        SAQ        Heel Slides                                Ther Activity                        Gait Training                        Modalities

## 2022-09-29 ENCOUNTER — OFFICE VISIT (OUTPATIENT)
Dept: PHYSICAL THERAPY | Facility: CLINIC | Age: 67
End: 2022-09-29
Payer: COMMERCIAL

## 2022-09-29 DIAGNOSIS — S82.831D OTHER CLOSED FRACTURE OF PROXIMAL END OF RIGHT FIBULA WITH ROUTINE HEALING, SUBSEQUENT ENCOUNTER: ICD-10-CM

## 2022-09-29 DIAGNOSIS — M17.12 PRIMARY OSTEOARTHRITIS OF LEFT KNEE: ICD-10-CM

## 2022-09-29 DIAGNOSIS — S82.141D CLOSED FRACTURE OF RIGHT TIBIAL PLATEAU WITH ROUTINE HEALING, SUBSEQUENT ENCOUNTER: Primary | ICD-10-CM

## 2022-09-29 PROCEDURE — 97110 THERAPEUTIC EXERCISES: CPT | Performed by: PHYSICAL THERAPIST

## 2022-09-29 NOTE — PROGRESS NOTES
Daily Note     Today's date: 2022  Patient name: Kimberly Adames  : 1955  MRN: [id number]  Referring provider: Trell Avery DO  Dx:   Encounter Diagnosis     ICD-10-CM    1  Closed fracture of right tibial plateau with routine healing, subsequent encounter  S82 141D    2  Other closed fracture of proximal end of right fibula with routine healing, subsequent encounter  S82 921D    3  Primary osteoarthritis of left knee  M17 12                   Subjective: Patient reports "I was sore after last time"  Objective: See treatment diary below      Assessment: Patient with good tolerance to first treatment today  Minimal VC's required for correct performance of TE  Fatigue noted post treatment today  Plan: Continue per plan of care  Precautions WBAT on right LE   Tibial plateau and proximal fibula fx       Manuals                                       Neuro Re-Ed                                                                 Ther Ex        Nustep        TR/HR        Standing SLR 3-way 10x B/L, each 15x B/L, each      Side-Stepping  6x8'      LAQ  2x10 B/L      Seated HS curls with TB        QS        SAQ        Heel Slides        Seated Marches  2x10 B/L      Seated Hip adduction squeeze        Standing HS curls  2x10                              Ther Activity                        Gait Training                        Modalities

## 2022-10-05 ENCOUNTER — APPOINTMENT (OUTPATIENT)
Dept: PHYSICAL THERAPY | Facility: CLINIC | Age: 67
End: 2022-10-05

## 2022-10-07 ENCOUNTER — OFFICE VISIT (OUTPATIENT)
Dept: PHYSICAL THERAPY | Facility: CLINIC | Age: 67
End: 2022-10-07
Payer: COMMERCIAL

## 2022-10-07 DIAGNOSIS — S82.141D CLOSED FRACTURE OF RIGHT TIBIAL PLATEAU WITH ROUTINE HEALING, SUBSEQUENT ENCOUNTER: Primary | ICD-10-CM

## 2022-10-07 DIAGNOSIS — M17.12 PRIMARY OSTEOARTHRITIS OF LEFT KNEE: ICD-10-CM

## 2022-10-07 DIAGNOSIS — S82.831D OTHER CLOSED FRACTURE OF PROXIMAL END OF RIGHT FIBULA WITH ROUTINE HEALING, SUBSEQUENT ENCOUNTER: ICD-10-CM

## 2022-10-07 PROCEDURE — 97110 THERAPEUTIC EXERCISES: CPT | Performed by: PHYSICAL THERAPIST

## 2022-10-07 NOTE — PROGRESS NOTES
Daily Note     Today's date: 10/7/2022  Patient name: Bertin Owens  : 1955  MRN: [id number]  Referring provider: Jaycee Mason DO  Dx:   Encounter Diagnosis     ICD-10-CM    1  Closed fracture of right tibial plateau with routine healing, subsequent encounter  S82 141D    2  Other closed fracture of proximal end of right fibula with routine healing, subsequent encounter  S82 831D    3  Primary osteoarthritis of left knee  M17 12                   Subjective: Patient states "I'm doing ok today"  Objective: See treatment diary below      Assessment: Tolerated treatment well  Patient exhibited good technique with therapeutic exercises  Mild fatigue noted with program today  Plan: Continue per plan of care  Precautions WBAT on right LE   Tibial plateau and proximal fibula fx       Manuals 9/27 9/29 10/7                                     Neuro Re-Ed                                                                 Ther Ex        Nustep        TR/HR   2x10     Standing SLR 3-way 10x B/L, each 15x B/L, each 2x10 B/L, each     Side-Stepping  6x8' 8x8'     LAQ  2x10 B/L 2x10 B/L     Seated HS curls with TB        QS        SAQ        Heel Slides        Seated Marches  2x10 B/L 2x10 B/L     Seated Hip adduction squeeze   2x10     Standing HS curls  2x10 2x10                             Ther Activity                        Gait Training                        Modalities

## 2022-10-11 ENCOUNTER — OFFICE VISIT (OUTPATIENT)
Dept: PHYSICAL THERAPY | Facility: CLINIC | Age: 67
End: 2022-10-11
Payer: COMMERCIAL

## 2022-10-11 DIAGNOSIS — M17.12 PRIMARY OSTEOARTHRITIS OF LEFT KNEE: ICD-10-CM

## 2022-10-11 DIAGNOSIS — S82.831D OTHER CLOSED FRACTURE OF PROXIMAL END OF RIGHT FIBULA WITH ROUTINE HEALING, SUBSEQUENT ENCOUNTER: ICD-10-CM

## 2022-10-11 DIAGNOSIS — S82.141D CLOSED FRACTURE OF RIGHT TIBIAL PLATEAU WITH ROUTINE HEALING, SUBSEQUENT ENCOUNTER: Primary | ICD-10-CM

## 2022-10-11 PROCEDURE — 97110 THERAPEUTIC EXERCISES: CPT | Performed by: PHYSICAL THERAPIST

## 2022-10-11 NOTE — PROGRESS NOTES
Daily Note     Today's date: 10/11/2022  Patient name: Mariusz Muller  : 1955  MRN: [id number]  Referring provider: Betty Carson DO  Dx:   Encounter Diagnosis     ICD-10-CM    1  Closed fracture of right tibial plateau with routine healing, subsequent encounter  S82 141D    2  Other closed fracture of proximal end of right fibula with routine healing, subsequent encounter  S82 651D    3  Primary osteoarthritis of left knee  M17 12                   Subjective: The patient states that her knee is feeling okay today but she is having more back pain  She will be going back to see the doctor on Thursday after her PT appointment for her follow up visit  Objective: See treatment diary below      Assessment: Tolerated treatment fair  Added NuStep today, able to complete 7 minutes on it  Limited with progression with TE secondary to back pain  Patient would benefit from continued PT to improve function and mobility  Plan: Continue per plan of care  Precautions WBAT on right LE   Tibial plateau and proximal fibula fx       Manuals 9/27 9/29 10/7 10/11                                    Neuro Re-Ed                                                                 Ther Ex        Nustep    L2 7 mins   No UE's    TR/HR   2x10 2x10 ea    Standing SLR 3-way 10x B/L, each 15x B/L, each 2x10 B/L, each 2x10 Tomas ea    Side-Stepping  6x8' 8x8' 8x8'    LAQ  2x10 B/L 2x10 B/L 2x10 Tomas    Seated HS curls with TB        QS        SAQ        Heel Slides        Seated Marches  2x10 B/L 2x10 B/L 2x10 Tomas    Seated Hip adduction squeeze   2x10 2x10 5" hold    Standing HS curls  2x10 2x10 2x10                            Ther Activity                        Gait Training                        Modalities

## 2022-10-13 ENCOUNTER — APPOINTMENT (OUTPATIENT)
Dept: PHYSICAL THERAPY | Facility: CLINIC | Age: 67
End: 2022-10-13

## 2022-10-13 ENCOUNTER — HOSPITAL ENCOUNTER (OUTPATIENT)
Dept: RADIOLOGY | Facility: CLINIC | Age: 67
End: 2022-10-13
Payer: COMMERCIAL

## 2022-10-13 ENCOUNTER — OFFICE VISIT (OUTPATIENT)
Dept: OBGYN CLINIC | Facility: CLINIC | Age: 67
End: 2022-10-13
Payer: MEDICARE

## 2022-10-13 VITALS
SYSTOLIC BLOOD PRESSURE: 128 MMHG | BODY MASS INDEX: 41.83 KG/M2 | HEIGHT: 68 IN | WEIGHT: 276 LBS | HEART RATE: 58 BPM | TEMPERATURE: 97.7 F | DIASTOLIC BLOOD PRESSURE: 80 MMHG

## 2022-10-13 DIAGNOSIS — S82.141D CLOSED FRACTURE OF RIGHT TIBIAL PLATEAU WITH ROUTINE HEALING, SUBSEQUENT ENCOUNTER: ICD-10-CM

## 2022-10-13 DIAGNOSIS — S82.831D OTHER CLOSED FRACTURE OF PROXIMAL END OF RIGHT FIBULA WITH ROUTINE HEALING, SUBSEQUENT ENCOUNTER: ICD-10-CM

## 2022-10-13 DIAGNOSIS — S82.141D CLOSED FRACTURE OF RIGHT TIBIAL PLATEAU WITH ROUTINE HEALING, SUBSEQUENT ENCOUNTER: Primary | ICD-10-CM

## 2022-10-13 PROCEDURE — 73560 X-RAY EXAM OF KNEE 1 OR 2: CPT

## 2022-10-13 PROCEDURE — 99213 OFFICE O/P EST LOW 20 MIN: CPT | Performed by: ORTHOPAEDIC SURGERY

## 2022-10-13 NOTE — PROGRESS NOTES
ASSESSMENT/PLAN:    Diagnoses and all orders for this visit:    Closed fracture of right tibial plateau with routine healing, subsequent encounter  -     XR knee 1 or 2 vw right; Future  -     Ambulatory Referral to Physical Therapy; Future    Other closed fracture of proximal end of right fibula with routine healing, subsequent encounter  -     XR knee 1 or 2 vw right; Future  -     Ambulatory Referral to Physical Therapy; Future        Plan:  I would recommend follow-up in 6-8 weeks  She is to continue in physical therapy  The brace is no longer necessary  I did offer her the potential benefit of intra-articular corticosteroid injection but she defers at this time  She is to contact me if she changes her mind and wishes to proceed with injection  Return for 6-8 weeks  _____________________________________________________  CHIEF COMPLAINT:  Chief Complaint   Patient presents with   • Follow-up         SUBJECTIVE:  Sean Denton is a 75y o  year old female who presents for follow up of her right knee  She is doing better but continues to complain of knee pain, to a mild degree, with notable improvement in her mobility and range of motion  Her lower back has been bothering her she feels that the exercises in physical therapy are aggravating her symptoms since these are think she has not done on the very long time        PAST MEDICAL HISTORY:  Past Medical History:   Diagnosis Date   • Allergic rhinitis    • Carpal tunnel syndrome on both sides    • Cervical disc herniation    • DVT (deep venous thrombosis) (HCC)     DVT Left Leg   • Fracture of foot     left casted   • History of mammogram 2017    Being followed by Dr Cordell Fry   • History of Papanicolaou smear of cervix 2016    Dr Cordell Fry   • Hx of colonoscopy 2016    Dr Luda Sorenson   • Hyperlipemia    • Hypertension    • Hypothyroidism    • Lichen sclerosus    • Renal calculi    • Rotator cuff tear, left        PAST SURGICAL HISTORY:  Past Surgical History:   Procedure Laterality Date   • CERVICAL LAMINECTOMY N/A    • CHOLECYSTECTOMY N/A    • KNEE CARTILAGE SURGERY Bilateral    • LITHOTRIPSY N/A    • NEUROPLASTY / TRANSPOSITION MEDIAN NERVE AT CARPAL TUNNEL BILATERAL Bilateral    • ROTATOR CUFF REPAIR Left    • TONSILLECTOMY AND ADENOIDECTOMY Bilateral    • TONSILLECTOMY AND ADENOIDECTOMY     • TUBAL LIGATION N/A    • US GUIDED THYROID BIOPSY  11/2018   • WRIST FRACTURE SURGERY Right     plates and pins        FAMILY HISTORY:  Family History   Problem Relation Age of Onset   • Hypertension Mother    • Hyperlipidemia Mother    • Hypothyroidism Mother    • Brain cancer Mother    • Hypertension Father    • Celiac disease Father    • Ovarian cancer Sister    • Celiac disease Brother    • Hypothyroidism Brother    • Diabetes Paternal Grandmother    • Hypothyroidism Sister    • Coronary artery disease Brother    • Hypertension Brother    • Hyperlipidemia Brother    • Diabetes Sister        SOCIAL HISTORY:  Social History     Tobacco Use   • Smoking status: Former Smoker     Packs/day: 0 30     Years: 10 00     Pack years: 3 00     Types: Cigarettes     Quit date: 2/24/2019     Years since quitting: 3 6   • Smokeless tobacco: Never Used   • Tobacco comment: quite date 1 year   Vaping Use   • Vaping Use: Never used   Substance Use Topics   • Alcohol use: No   • Drug use: No       MEDICATIONS:    Current Outpatient Medications:   •  ascorbic acid (VITAMIN C) 250 mg tablet, Take 500 mg by mouth daily, Disp: , Rfl:   •  aspirin (ECOTRIN LOW STRENGTH) 81 mg EC tablet, Take 1 tablet (81 mg total) by mouth daily, Disp: 90 tablet, Rfl: 1  •  atorvastatin (LIPITOR) 40 mg tablet, Take 1 tablet (40 mg total) by mouth daily, Disp: 90 tablet, Rfl: 3  •  cholecalciferol (VITAMIN D3) 1,000 units tablet, Take 2 tablets (2,000 Units total) by mouth daily, Disp: 90 tablet, Rfl: 1  •  diclofenac sodium (VOLTAREN) 50 mg EC tablet, Take 1 tablet (50 mg total) by mouth 2 (two) times a day, Disp: 180 tablet, Rfl: 1  •  fluticasone (FLONASE) 50 mcg/act nasal spray, 1 spray each nostril 2 x daily prn congestion, Disp: 18 2 mL, Rfl: 5  •  ketoconazole (NIZORAL) 2 % cream, Apply topically 2 (two) times a day as needed for itching, irritation or rash, Disp: 60 g, Rfl: 5  •  levothyroxine 137 mcg tablet, Take 1 tablet (137 mcg total) by mouth daily in the early morning, Disp: 90 tablet, Rfl: 1  •  loratadine (CLARITIN) 10 mg tablet, 1 tab po daily x 14 days then prn allergies, Disp: 30 tablet, Rfl: 3  •  magnesium oxide (MAG-OX) 400 mg, Take 1 tablet (400 mg total) by mouth daily at bedtime, Disp: 90 tablet, Rfl: 1  •  mupirocin (BACTROBAN) 2 % ointment, Apply topically 3 (three) times a day for 10 days, Disp: 22 g, Rfl: 2  •  pantoprazole (PROTONIX) 40 mg tablet, Take 1 tablet (40 mg total) by mouth daily, Disp: 90 tablet, Rfl: 1  •  RA CALCIUM 600/VITAMIN D-3 600-400 MG-UNIT TABS, Take 1 tablet by mouth 2 (two) times a day, Disp: , Rfl: 0  •  triamterene-hydrochlorothiazide (DYAZIDE) 37 5-25 mg per capsule, Take 1 capsule by mouth every morning, Disp: 90 capsule, Rfl: 1  •  vitamin B-12 (VITAMIN B-12) 1,000 mcg tablet, TAKE 1 TABLET BY MOUTH EVERY OTHER DAY, Disp: 90 tablet, Rfl: 1  •  Zinc 25 MG TABS, Take 1 tablet by mouth daily, Disp: , Rfl:   •  oxyCODONE (Roxicodone) 5 immediate release tablet, 1 p o  Q 8 hours p r n  pain (Patient not taking: Reported on 10/13/2022), Disp: 10 tablet, Rfl: 0    ALLERGIES:  No Known Allergies    REVIEW OF SYSTEMS:  Pertinent items are noted in HPI  A comprehensive review of systems was negative       _____________________________________________________  PHYSICAL EXAMINATION:  General: well developed and well nourished, alert and appears comfortable  Psychiatric: Normal  HEENT:  Normocephalic, atraumatic  Cardiovascular:  Regular  Pulmonary: No wheezing or stridor  Skin: No masses, erthema, lacerations, fluctation, ulcerations  Neurovascular:   Motor and sensory exams are intact and pulses palpable  MUSCULOSKELETAL EXAMINATION:    The right knee exam demonstrates the brace in place  This was removed without difficulty  She has full extension and flexion to 120°, primarily limited by body habitus  She has no localized tenderness about the knee  Ligaments are grossly stable although slight laxity is noted with collateral testing due to the degenerative disease  Mild hypertrophy is noted  The skin is warm and dry  Calf compartments are soft and nontender  Pulses are palpable distally  _____________________________________________________  STUDIES REVIEWED:  X-rays demonstrate complete healing of the fractures with degenerative disease of the right knee as previously noted              Angelito Cheney

## 2022-10-18 ENCOUNTER — APPOINTMENT (OUTPATIENT)
Dept: LAB | Facility: CLINIC | Age: 67
End: 2022-10-18
Payer: COMMERCIAL

## 2022-10-18 ENCOUNTER — OFFICE VISIT (OUTPATIENT)
Dept: PHYSICAL THERAPY | Facility: CLINIC | Age: 67
End: 2022-10-18
Payer: COMMERCIAL

## 2022-10-18 DIAGNOSIS — S82.831D OTHER CLOSED FRACTURE OF PROXIMAL END OF RIGHT FIBULA WITH ROUTINE HEALING, SUBSEQUENT ENCOUNTER: ICD-10-CM

## 2022-10-18 DIAGNOSIS — N28.9 RENAL INSUFFICIENCY: ICD-10-CM

## 2022-10-18 DIAGNOSIS — D64.9 ANEMIA, UNSPECIFIED TYPE: ICD-10-CM

## 2022-10-18 DIAGNOSIS — E78.00 PURE HYPERCHOLESTEROLEMIA: ICD-10-CM

## 2022-10-18 DIAGNOSIS — E55.9 VITAMIN D DEFICIENCY: ICD-10-CM

## 2022-10-18 DIAGNOSIS — S82.141D CLOSED FRACTURE OF RIGHT TIBIAL PLATEAU WITH ROUTINE HEALING, SUBSEQUENT ENCOUNTER: Primary | ICD-10-CM

## 2022-10-18 DIAGNOSIS — S82.839S CLOSED FRACTURE OF DISTAL END OF FIBULA, UNSPECIFIED FRACTURE MORPHOLOGY, UNSPECIFIED LATERALITY, SEQUELA: ICD-10-CM

## 2022-10-18 DIAGNOSIS — M17.12 PRIMARY OSTEOARTHRITIS OF LEFT KNEE: ICD-10-CM

## 2022-10-18 DIAGNOSIS — R73.9 HYPERGLYCEMIA: ICD-10-CM

## 2022-10-18 DIAGNOSIS — E03.9 ACQUIRED HYPOTHYROIDISM: ICD-10-CM

## 2022-10-18 DIAGNOSIS — E53.8 VITAMIN B12 DEFICIENCY: ICD-10-CM

## 2022-10-18 LAB
25(OH)D3 SERPL-MCNC: 38.1 NG/ML (ref 30–100)
ALBUMIN SERPL BCP-MCNC: 3.6 G/DL (ref 3.5–5)
ALP SERPL-CCNC: 137 U/L (ref 46–116)
ALT SERPL W P-5'-P-CCNC: 29 U/L (ref 12–78)
ANION GAP SERPL CALCULATED.3IONS-SCNC: 8 MMOL/L (ref 4–13)
AST SERPL W P-5'-P-CCNC: 19 U/L (ref 5–45)
BASOPHILS # BLD AUTO: 0.09 THOUSANDS/ΜL (ref 0–0.1)
BASOPHILS NFR BLD AUTO: 1 % (ref 0–1)
BILIRUB SERPL-MCNC: 0.64 MG/DL (ref 0.2–1)
BUN SERPL-MCNC: 16 MG/DL (ref 5–25)
CALCIUM SERPL-MCNC: 9.7 MG/DL (ref 8.3–10.1)
CHLORIDE SERPL-SCNC: 103 MMOL/L (ref 96–108)
CHOLEST SERPL-MCNC: 217 MG/DL
CO2 SERPL-SCNC: 26 MMOL/L (ref 21–32)
CREAT SERPL-MCNC: 1.11 MG/DL (ref 0.6–1.3)
EOSINOPHIL # BLD AUTO: 0.24 THOUSAND/ΜL (ref 0–0.61)
EOSINOPHIL NFR BLD AUTO: 3 % (ref 0–6)
ERYTHROCYTE [DISTWIDTH] IN BLOOD BY AUTOMATED COUNT: 15.3 % (ref 11.6–15.1)
EST. AVERAGE GLUCOSE BLD GHB EST-MCNC: 120 MG/DL
GFR SERPL CREATININE-BSD FRML MDRD: 51 ML/MIN/1.73SQ M
GLUCOSE P FAST SERPL-MCNC: 102 MG/DL (ref 65–99)
HBA1C MFR BLD: 5.8 %
HCT VFR BLD AUTO: 44.8 % (ref 34.8–46.1)
HDLC SERPL-MCNC: 42 MG/DL
HGB BLD-MCNC: 14.4 G/DL (ref 11.5–15.4)
IMM GRANULOCYTES # BLD AUTO: 0.03 THOUSAND/UL (ref 0–0.2)
IMM GRANULOCYTES NFR BLD AUTO: 0 % (ref 0–2)
INSULIN SERPL-ACNC: 23 MU/L (ref 3–25)
LDLC SERPL CALC-MCNC: 138 MG/DL (ref 0–100)
LYMPHOCYTES # BLD AUTO: 2.5 THOUSANDS/ΜL (ref 0.6–4.47)
LYMPHOCYTES NFR BLD AUTO: 29 % (ref 14–44)
MCH RBC QN AUTO: 30.8 PG (ref 26.8–34.3)
MCHC RBC AUTO-ENTMCNC: 32.1 G/DL (ref 31.4–37.4)
MCV RBC AUTO: 96 FL (ref 82–98)
MONOCYTES # BLD AUTO: 0.56 THOUSAND/ΜL (ref 0.17–1.22)
MONOCYTES NFR BLD AUTO: 7 % (ref 4–12)
NEUTROPHILS # BLD AUTO: 5.2 THOUSANDS/ΜL (ref 1.85–7.62)
NEUTS SEG NFR BLD AUTO: 60 % (ref 43–75)
NONHDLC SERPL-MCNC: 175 MG/DL
NRBC BLD AUTO-RTO: 0 /100 WBCS
PLATELET # BLD AUTO: 244 THOUSANDS/UL (ref 149–390)
PMV BLD AUTO: 11.2 FL (ref 8.9–12.7)
POTASSIUM SERPL-SCNC: 4.2 MMOL/L (ref 3.5–5.3)
PROT SERPL-MCNC: 8.2 G/DL (ref 6.4–8.4)
RBC # BLD AUTO: 4.67 MILLION/UL (ref 3.81–5.12)
SODIUM SERPL-SCNC: 137 MMOL/L (ref 135–147)
TRIGL SERPL-MCNC: 186 MG/DL
TSH SERPL DL<=0.05 MIU/L-ACNC: 7.94 UIU/ML (ref 0.45–4.5)
VIT B12 SERPL-MCNC: 564 PG/ML (ref 100–900)
WBC # BLD AUTO: 8.62 THOUSAND/UL (ref 4.31–10.16)

## 2022-10-18 PROCEDURE — 80061 LIPID PANEL: CPT

## 2022-10-18 PROCEDURE — 82306 VITAMIN D 25 HYDROXY: CPT

## 2022-10-18 PROCEDURE — 36415 COLL VENOUS BLD VENIPUNCTURE: CPT

## 2022-10-18 PROCEDURE — 97110 THERAPEUTIC EXERCISES: CPT | Performed by: PHYSICAL THERAPIST

## 2022-10-18 PROCEDURE — 82607 VITAMIN B-12: CPT

## 2022-10-18 PROCEDURE — 83525 ASSAY OF INSULIN: CPT

## 2022-10-18 PROCEDURE — 84443 ASSAY THYROID STIM HORMONE: CPT

## 2022-10-18 PROCEDURE — 83036 HEMOGLOBIN GLYCOSYLATED A1C: CPT

## 2022-10-18 PROCEDURE — 85025 COMPLETE CBC W/AUTO DIFF WBC: CPT

## 2022-10-18 PROCEDURE — 80053 COMPREHEN METABOLIC PANEL: CPT

## 2022-10-18 NOTE — PROGRESS NOTES
Daily Note     Today's date: 10/18/2022  Patient name: Tonya Katz  : 1955  MRN: [id number]  Referring provider: Eugenio Chavira DO  Dx:   Encounter Diagnosis     ICD-10-CM    1  Closed fracture of right tibial plateau with routine healing, subsequent encounter  S82 141D    2  Other closed fracture of proximal end of right fibula with routine healing, subsequent encounter  S82 831D    3  Primary osteoarthritis of left knee  M17 12                   Subjective: Patient states "I was at the doctor  I don't have to wear the brace anymore and he wants me to continue with PT"  Objective: See treatment diary below      Assessment: Tolerated treatment well  Patient exhibited good technique with therapeutic exercises  Patient with improving strength noted with exercise  Plan: Continue per plan of care  Precautions WBAT on right LE   Tibial plateau and proximal fibula fx       Manuals 9/27 9/29 10/7 10/11 10/18                                   Neuro Re-Ed                                                                 Ther Ex        Nustep    L2 7 mins   No UE's L3 8 min   TR/HR   2x10 2x10 ea 3x10 each   Standing SLR 3-way 10x B/L, each 15x B/L, each 2x10 B/L, each 2x10 Tomas ea 2x10 each   Side-Stepping  6x8' 8x8' 8x8' 8x8'   LAQ  2x10 B/L 2x10 B/L 2x10 Tomas 2x10 B/L   Seated HS curls with TB        QS        SAQ        Heel Slides        Seated Marches  2x10 B/L 2x10 B/L 2x10 Tomas 2x10 B/L   Seated Hip adduction squeeze   2x10 2x10 5" hold 2x10 5" hold   Standing HS curls  2x10 2x10 2x10 2x10                           Ther Activity                        Gait Training                        Modalities

## 2022-10-20 ENCOUNTER — OFFICE VISIT (OUTPATIENT)
Dept: PHYSICAL THERAPY | Facility: CLINIC | Age: 67
End: 2022-10-20
Payer: COMMERCIAL

## 2022-10-20 DIAGNOSIS — S82.141D CLOSED FRACTURE OF RIGHT TIBIAL PLATEAU WITH ROUTINE HEALING, SUBSEQUENT ENCOUNTER: Primary | ICD-10-CM

## 2022-10-20 DIAGNOSIS — M17.12 PRIMARY OSTEOARTHRITIS OF LEFT KNEE: ICD-10-CM

## 2022-10-20 DIAGNOSIS — S82.831D OTHER CLOSED FRACTURE OF PROXIMAL END OF RIGHT FIBULA WITH ROUTINE HEALING, SUBSEQUENT ENCOUNTER: ICD-10-CM

## 2022-10-20 PROCEDURE — 97110 THERAPEUTIC EXERCISES: CPT

## 2022-10-20 NOTE — PROGRESS NOTES
Daily Note     Today's date: 10/20/2022  Patient name: Román Kay  : 1955  MRN: [id number]  Referring provider: Balwinder Sheets DO  Dx:   Encounter Diagnosis     ICD-10-CM    1  Closed fracture of right tibial plateau with routine healing, subsequent encounter  S82 141D    2  Other closed fracture of proximal end of right fibula with routine healing, subsequent encounter  S82 831D    3  Primary osteoarthritis of left knee  M17 12        Start Time: 1000  Stop Time: 1100  Total time in clinic (min): 60 minutes    Subjective: Patient denied pain at start of session, stating "doing good "       Objective: PTA added to program this session, See treatment diary below  Assessment: Tolerated treatment well  Patient responded well and  exhibited good technique with therapeutic exercises with added TE  Discomfort noted in LB post supine TE  Heat applied to LB In seated post treat with good skin integrity  Plan: Continue per plan of care  Precautions WBAT on right LE   Tibial plateau and proximal fibula fx       Manuals 10/20 9/29 10/7 10/11 10/18                                   Neuro Re-Ed                                                                 Ther Ex        Nustep L3 10 min    L2 7 mins   No UE's L3 8 min   TR/HR 30x each   2x10 2x10 ea 3x10 each   Standing SLR 3-way 2x10 each  15x B/L, each 2x10 B/L, each 2x10 Tomas ea 2x10 each   Side-Stepping 6x8' 6x8' 8x8' 8x8' 8x8'   LAQ 2x10 B/L  2x10 B/L 2x10 B/L 2x10 Tomas 2x10 B/L   Seated HS curls with TB GTB 2x10 R only        QS        SAQ 2x10       Heel Slides 2x10 R only        Seated Marches 2x10 B/L  2x10 B/L 2x10 B/L 2x10 Tomas 2x10 B/L   Seated Hip adduction squeeze 2x10 5" hold   2x10 2x10 5" hold 2x10 5" hold   Standing HS curls 2x10  2x10 2x10 2x10 2x10                           Ther Activity                        Gait Training                        Modalities

## 2022-10-24 ENCOUNTER — OFFICE VISIT (OUTPATIENT)
Dept: FAMILY MEDICINE CLINIC | Facility: CLINIC | Age: 67
End: 2022-10-24
Payer: COMMERCIAL

## 2022-10-24 VITALS
SYSTOLIC BLOOD PRESSURE: 136 MMHG | HEIGHT: 68 IN | BODY MASS INDEX: 42.65 KG/M2 | TEMPERATURE: 98.1 F | HEART RATE: 73 BPM | WEIGHT: 281.4 LBS | OXYGEN SATURATION: 98 % | RESPIRATION RATE: 18 BRPM | DIASTOLIC BLOOD PRESSURE: 76 MMHG

## 2022-10-24 DIAGNOSIS — I10 ESSENTIAL HYPERTENSION: ICD-10-CM

## 2022-10-24 DIAGNOSIS — S82.831D OTHER CLOSED FRACTURE OF PROXIMAL END OF RIGHT FIBULA WITH ROUTINE HEALING, SUBSEQUENT ENCOUNTER: ICD-10-CM

## 2022-10-24 DIAGNOSIS — D64.9 ANEMIA, UNSPECIFIED TYPE: ICD-10-CM

## 2022-10-24 DIAGNOSIS — E55.9 VITAMIN D DEFICIENCY: ICD-10-CM

## 2022-10-24 DIAGNOSIS — E03.9 ACQUIRED HYPOTHYROIDISM: Primary | ICD-10-CM

## 2022-10-24 DIAGNOSIS — E78.00 PURE HYPERCHOLESTEROLEMIA: ICD-10-CM

## 2022-10-24 DIAGNOSIS — E53.8 VITAMIN B12 DEFICIENCY: ICD-10-CM

## 2022-10-24 DIAGNOSIS — R73.9 HYPERGLYCEMIA: ICD-10-CM

## 2022-10-24 DIAGNOSIS — M17.12 PRIMARY OSTEOARTHRITIS OF LEFT KNEE: ICD-10-CM

## 2022-10-24 DIAGNOSIS — N28.9 RENAL INSUFFICIENCY: ICD-10-CM

## 2022-10-24 DIAGNOSIS — K21.00 GASTROESOPHAGEAL REFLUX DISEASE WITH ESOPHAGITIS WITHOUT HEMORRHAGE: ICD-10-CM

## 2022-10-24 DIAGNOSIS — E78.49 OTHER HYPERLIPIDEMIA: ICD-10-CM

## 2022-10-24 DIAGNOSIS — R22.43 LOCALIZED SWELLING OF BOTH LOWER LEGS: ICD-10-CM

## 2022-10-24 DIAGNOSIS — J30.2 SEASONAL ALLERGIC RHINITIS, UNSPECIFIED TRIGGER: ICD-10-CM

## 2022-10-24 DIAGNOSIS — M25.552 LEFT HIP PAIN: ICD-10-CM

## 2022-10-24 PROCEDURE — 99214 OFFICE O/P EST MOD 30 MIN: CPT | Performed by: NURSE PRACTITIONER

## 2022-10-24 RX ORDER — ATORVASTATIN CALCIUM 40 MG/1
40 TABLET, FILM COATED ORAL DAILY
Qty: 90 TABLET | Refills: 3 | Status: SHIPPED | OUTPATIENT
Start: 2022-10-24

## 2022-10-24 RX ORDER — LEVOTHYROXINE SODIUM 0.15 MG/1
150 TABLET ORAL
Qty: 90 TABLET | Refills: 1 | Status: SHIPPED | OUTPATIENT
Start: 2022-10-24

## 2022-10-24 RX ORDER — TRIAMTERENE AND HYDROCHLOROTHIAZIDE 37.5; 25 MG/1; MG/1
1 CAPSULE ORAL EVERY MORNING
Qty: 90 CAPSULE | Refills: 1 | Status: SHIPPED | OUTPATIENT
Start: 2022-10-24

## 2022-10-24 RX ORDER — LEVOTHYROXINE SODIUM 0.15 MG/1
150 TABLET ORAL
Qty: 90 TABLET | Refills: 1 | Status: SHIPPED | OUTPATIENT
Start: 2022-10-24 | End: 2022-10-24 | Stop reason: SDUPTHER

## 2022-10-24 NOTE — PROGRESS NOTES
Name: Lamberto Wright      : 1955      MRN: [id number]  Encounter Provider: KELLIE Askew  Encounter Date: 10/24/2022   Encounter department: [address] [place] [address]     1  Acquired hypothyroidism  -     TSH, 3rd generation; Future; Expected date: 2023  -     TSH, 3rd generation; Future; Expected date: 2022  -     levothyroxine 150 mcg tablet; Take 1 tablet (150 mcg total) by mouth daily in the early morning    2  Gastroesophageal reflux disease with esophagitis without hemorrhage    3  Essential hypertension  -     CBC and differential; Future; Expected date: 2023    4  Other closed fracture of proximal end of right fibula with routine healing, subsequent encounter    5  Primary osteoarthritis of left knee    6  Vitamin B12 deficiency  -     Vitamin B12; Future; Expected date: 2023    7  Vitamin D deficiency  -     Vitamin D 25 hydroxy; Future; Expected date: 2023    8  Pure hypercholesterolemia  -     Lipid panel; Future; Expected date: 2023    9  Renal insufficiency  -     Comprehensive metabolic panel; Future; Expected date: 2022  -     Comprehensive metabolic panel; Future; Expected date: 2023    10  Anemia, unspecified type  -     CBC and differential; Future; Expected date: 2023    11  Hyperglycemia  -     Comprehensive metabolic panel; Future; Expected date: 2023  -     Hemoglobin A1C; Future; Expected date: 2023  -     Insulin, fasting; Future; Expected date: 2023    12  Other hyperlipidemia  -     atorvastatin (LIPITOR) 40 mg tablet; Take 1 tablet (40 mg total) by mouth daily    13  Left hip pain  -     diclofenac sodium (VOLTAREN) 50 mg EC tablet; Take 1 tablet (50 mg total) by mouth 2 (two) times a day    14  Seasonal allergic rhinitis, unspecified trigger    15  Localized swelling of both lower legs  -     triamterene-hydrochlorothiazide (DYAZIDE) 37 5-25 mg per capsule;  Take 1 capsule by mouth every morning        Falls Plan of Care: balance, strength, and gait training instructions were provided  Recommended assistive device to help with gait and balance  Medications that increase falls were reviewed  Assessed feet and footwear  Vitamin D supplementation was recommended  Home safety education provided  Tobacco Cessation Counseling: Tobacco cessation counseling was provided  The patient is sincerely urged to quit consumption of tobacco  She is not ready to quit tobacco  Medication options and side effects of medication discussed  Patient refused medication  Subjective     Patient presents to office for follow up and recheck  Complete medical history and medications reviewed with patient and tolerating all medications well without any problems  Vital signs reviewed and stable  Lab results reviewed with patient  Currently being followed by Lefty Nixon for Hx closed fracture of right tibia  Continues to be followed by Dr Barragan ENT for Hx Thyroid Disorder and Thyroid Nodule  Patient had recent thyroid biopsy done of thyroid nodule completed by Dr Valverde Enter  Denies any other problems or concerns at the present time  Review of Systems  GENERAL:  Feels well, denies any significant changes in weight without trying  SKIN:  Denies rashes, lesions, opened areas, wounds, change in moles or any other skin changes  HEENT:  Denies any head injury or headaches  Negative blurred vision, floaters, spots before eyes, infections, or other vision problems  Negative significant changes in vision or hearing  Negative tinnitus, vertigo, or infections  Negative hay fever, sinus trouble, nasal discharge, bloody noses, or problems with smell  Negative sore throat, bleeding gums, ulcers, or sores  NECK:  Denies lumps, goiter, pain, swollen glands, or lymphadenopathy  BREASTS:  Denies lumps, pain, nipple discharge, swelling, redness, or any other changes    RESPIRATORY: Denies cough, wheezing, shortness of breath, dyspnea, or orthopnea  CARDIOVASCULAR:  Denies chest pain or palpitations  GASTROINTESTINAL:  Appetite good, denies nausea, vomiting, or indigestion  Bowel movements normal occurring about once daily or every other day  URINARY:  Denies frequency, urgency, incontinence, dysuria, hematuria, nocturia, or recent flank pain  GENITAL:  Denies vaginal discharge, pelvic infection, lesions, ulcers, or pain  Negative dyspareunia or abnormal vaginal bleeding  PERIPHERAL VASCULAR:  Denies varicosities, swelling, skin changes, or pain  MUSCULOSKELETAL:  Denies back, joint, or muscle pain  Negative problems with mobility or movement  PSYCHIATRIC:  Denies problems with depression, anxiety, anger, or other psychiatric symptoms  NEUROLOGIC:  Denies fainting, dizziness, memory problems, seizures, tingling, motor or sensory loss  HEMATOLOGIC:  Denies easy bruising, bleeding, or anemia  ENDOCRINE:  Denies thyroid problems, temperature intolerance, excessive sweating, or other endocrine symptoms       Past Medical History:   Diagnosis Date   • Allergic rhinitis    • Carpal tunnel syndrome on both sides    • Cervical disc herniation    • DVT (deep venous thrombosis) (HCC)     DVT Left Leg   • Fracture of foot     left casted   • History of mammogram 2017    Being followed by Dr Syed Celis   • History of Papanicolaou smear of cervix 2016    Dr Syed Celis   • Hx of colonoscopy 2016    Dr Paul Harrison   • Hyperlipemia    • Hypertension    • Hypothyroidism    • Lichen sclerosus    • Renal calculi    • Rotator cuff tear, left      Past Surgical History:   Procedure Laterality Date   • CERVICAL LAMINECTOMY N/A    • CHOLECYSTECTOMY N/A    • KNEE CARTILAGE SURGERY Bilateral    • LITHOTRIPSY N/A    • NEUROPLASTY / TRANSPOSITION MEDIAN NERVE AT CARPAL TUNNEL BILATERAL Bilateral    • ROTATOR CUFF REPAIR Left    • TONSILLECTOMY AND ADENOIDECTOMY Bilateral    • TONSILLECTOMY AND ADENOIDECTOMY     • TUBAL LIGATION N/A    • US GUIDED THYROID BIOPSY  11/2018   • WRIST FRACTURE SURGERY Right     plates and pins      Family History   Problem Relation Age of Onset   • Hypertension Mother    • Hyperlipidemia Mother    • Hypothyroidism Mother    • Brain cancer Mother    • Hypertension Father    • Celiac disease Father    • Ovarian cancer Sister    • Celiac disease Brother    • Hypothyroidism Brother    • Diabetes Paternal Grandmother    • Hypothyroidism Sister    • Coronary artery disease Brother    • Hypertension Brother    • Hyperlipidemia Brother    • Diabetes Sister      Social History     Socioeconomic History   • Marital status:      Spouse name: None   • Number of children: None   • Years of education: None   • Highest education level: None   Occupational History   • None   Tobacco Use   • Smoking status: Current Some Day Smoker     Packs/day: 0 30     Years: 10 00     Pack years: 3 00     Types: Cigarettes     Last attempt to quit: 2/24/2019     Years since quitting: 3 6   • Smokeless tobacco: Never Used   • Tobacco comment: quite date 1 year   Vaping Use   • Vaping Use: Never used   Substance and Sexual Activity   • Alcohol use: No   • Drug use: No   • Sexual activity: Not Currently   Other Topics Concern   • None   Social History Narrative   • None     Social Determinants of Health     Financial Resource Strain: Not on file   Food Insecurity: Not on file   Transportation Needs: Not on file   Physical Activity: Not on file   Stress: Not on file   Social Connections: Not on file   Intimate Partner Violence: Not on file   Housing Stability: Not on file     Current Outpatient Medications on File Prior to Visit   Medication Sig   • ascorbic acid (VITAMIN C) 250 mg tablet Take 500 mg by mouth daily   • aspirin (ECOTRIN LOW STRENGTH) 81 mg EC tablet Take 1 tablet (81 mg total) by mouth daily   • cholecalciferol (VITAMIN D3) 1,000 units tablet Take 2 tablets (2,000 Units total) by mouth daily   • fluticasone (FLONASE) 50 mcg/act nasal spray 1 spray each nostril 2 x daily prn congestion   • ketoconazole (NIZORAL) 2 % cream Apply topically 2 (two) times a day as needed for itching, irritation or rash   • loratadine (CLARITIN) 10 mg tablet 1 tab po daily x 14 days then prn allergies   • magnesium oxide (MAG-OX) 400 mg Take 1 tablet (400 mg total) by mouth daily at bedtime   • pantoprazole (PROTONIX) 40 mg tablet Take 1 tablet (40 mg total) by mouth daily   • RA CALCIUM 600/VITAMIN D-3 600-400 MG-UNIT TABS Take 1 tablet by mouth 2 (two) times a day   • vitamin B-12 (VITAMIN B-12) 1,000 mcg tablet TAKE 1 TABLET BY MOUTH EVERY OTHER DAY   • Zinc 25 MG TABS Take 1 tablet by mouth daily   • [DISCONTINUED] atorvastatin (LIPITOR) 40 mg tablet Take 1 tablet (40 mg total) by mouth daily   • [DISCONTINUED] diclofenac sodium (VOLTAREN) 50 mg EC tablet Take 1 tablet (50 mg total) by mouth 2 (two) times a day   • [DISCONTINUED] levothyroxine 137 mcg tablet Take 1 tablet (137 mcg total) by mouth daily in the early morning   • [DISCONTINUED] triamterene-hydrochlorothiazide (DYAZIDE) 37 5-25 mg per capsule Take 1 capsule by mouth every morning   • [DISCONTINUED] mupirocin (BACTROBAN) 2 % ointment Apply topically 3 (three) times a day for 10 days   • [DISCONTINUED] oxyCODONE (Roxicodone) 5 immediate release tablet 1 p o  Q 8 hours p r n  pain (Patient not taking: No sig reported)     No Known Allergies  Immunization History   Administered Date(s) Administered   • INFLUENZA 08/25/2017, 09/19/2018, 10/27/2020   • Influenza, injectable, quadrivalent, preservative free 0 5 mL 09/19/2018   • Pneumococcal Conjugate 13-Valent 10/27/2020   • Pneumococcal Polysaccharide PPV23 01/02/2020   • Tdap 11/20/2017   • Zoster 01/08/2017   • Zoster Vaccine Recombinant 10/03/2018, 10/03/2019       Objective     /76 (BP Location: Right arm, Patient Position: Sitting, Cuff Size: Large)   Pulse 73   Temp 98 1 °F (36 7 °C) (Temporal)   Resp 18   Ht 5' 8" (1 727 m)   Wt 128 kg (281 lb 6 4 oz)   LMP 01/01/2004   SpO2 98%   BMI 42 79 kg/m²     Physical Exam  Vitals and nursing note reviewed  GENERAL:  Appears well nourished, well groomed, in no acute distress  SKIN:  Palms warm, dry, color good  Nails without clubbing or cyanosis  No lesions, ulcerations, or wounds  HEAD:  Hair is average texture  Scalp without lesions, normocephalic, and atraumatic  EYES:  Visual fields full by confrontation  Conjunctiva pink, sclera white, PERRLA  EARS:  B/L ear canals clear  B/L TMs clear with + light reflex  NOSE: Mucosa pink, moist, septum midline  Negative sinus tenderness  B/L turbinates pink, moist, non-edematous without exudate  MOUTH:  Oral mucosa pink  Pharynx pink, moist, without swelling, redness, or exudate  Dentition ok  Tongue midline  NECK:  Supple, trachea midline, Negative thyromegaly, lymphadenopathy, or swollen glands  LYMPH NODES:  Negative enlargement of neck, axillary, epitrochlear, or inguinal nodes  THORAX/LUNGS  Thorax symmetric with good excursion  Lungs resonant  Breath sounds vesicular with no added sounds  Diaphragm descends within normal limits  CARDIOVASCULAR:  Carotid upstrokes brisk and without bruits  Apical impulse discrete and tapping, barely palpable in the 5th ICS/MCL  Normal S1 and Normal S2, Negative S3 or S4  Negative murmurs, thrills, lifts, or heaves  ABDOMEN:  Protuberant, bowel sounds normal active x 4 quadrants  Negative tenderness  Negative masses  Negative hepatomegaly  Negative splenomegaly  Negative costovertebral tenderness  EXTREMITIES:  Warm, calves supple, non-tender, negative for edema  Negative stasis pigmentation or ulcers  +2 pulses throughout  MUSCULOSKELETAL:  Negative joint deformities  Good range of motion in hands, wrists, elbows, shoulders, spine, hips, knees, and ankles    NEUROLOGICAL:  Mental status:  Awake, alert, and oriented to person, place, time, and event  Normal thought processes           Valentino Henderson, CRNP

## 2022-10-24 NOTE — PATIENT INSTRUCTIONS

## 2022-10-25 ENCOUNTER — OFFICE VISIT (OUTPATIENT)
Dept: PHYSICAL THERAPY | Facility: CLINIC | Age: 67
End: 2022-10-25
Payer: COMMERCIAL

## 2022-10-25 DIAGNOSIS — M17.12 PRIMARY OSTEOARTHRITIS OF LEFT KNEE: ICD-10-CM

## 2022-10-25 DIAGNOSIS — S82.831D OTHER CLOSED FRACTURE OF PROXIMAL END OF RIGHT FIBULA WITH ROUTINE HEALING, SUBSEQUENT ENCOUNTER: ICD-10-CM

## 2022-10-25 DIAGNOSIS — S82.141D CLOSED FRACTURE OF RIGHT TIBIAL PLATEAU WITH ROUTINE HEALING, SUBSEQUENT ENCOUNTER: Primary | ICD-10-CM

## 2022-10-25 PROCEDURE — 97110 THERAPEUTIC EXERCISES: CPT | Performed by: PHYSICAL THERAPIST

## 2022-10-25 NOTE — PROGRESS NOTES
Daily Note     Today's date: 10/25/2022  Patient name: Davon Desai  : 1955  MRN: [id number]  Referring provider: Sara Weinstein DO  Dx:   Encounter Diagnosis     ICD-10-CM    1  Closed fracture of right tibial plateau with routine healing, subsequent encounter  S82 141D    2  Other closed fracture of proximal end of right fibula with routine healing, subsequent encounter  S82 831D    3  Primary osteoarthritis of left knee  M17 12                   Subjective: Patient states "I'm doing ok today"  Objective: See treatment diary below      Assessment: Tolerated treatment well  Patient exhibited good technique with therapeutic exercises      Plan: Continue per plan of care  Precautions WBAT on right LE   Tibial plateau and proximal fibula fx       Manuals 10/20 10/25 10/7 10/11 10/18                                   Neuro Re-Ed                                                                 Ther Ex        Nustep L3 10 min  L3 10 min  L2 7 mins   No UE's L3 8 min   TR/HR 30x each  30x each 2x10 2x10 ea 3x10 each   Standing SLR 3-way 2x10 each  20x B/L, each 2x10 B/L, each 2x10 Tomas ea 2x10 each   Side-Stepping 6x8' 6x8' 8x8' 8x8' 8x8'   LAQ 2x10 B/L  2x10 B/L 2x10 B/L 2x10 Tomas 2x10 B/L   Seated HS curls with TB GTB 2x10 R only  GTB 2x10 R      QS        SAQ 2x10 2x10      Heel Slides 2x10 R only  2x10 R only      Seated Marches 2x10 B/L  2x10 B/L 2x10 B/L 2x10 Tomas 2x10 B/L   Seated Hip adduction squeeze 2x10 5" hold  2x10 5" hold 2x10 2x10 5" hold 2x10 5" hold   Standing HS curls 2x10  2x10 2x10 2x10 2x10                           Ther Activity                        Gait Training                        Modalities

## 2022-10-28 ENCOUNTER — OFFICE VISIT (OUTPATIENT)
Dept: PHYSICAL THERAPY | Facility: CLINIC | Age: 67
End: 2022-10-28
Payer: COMMERCIAL

## 2022-10-28 DIAGNOSIS — S82.141D CLOSED FRACTURE OF RIGHT TIBIAL PLATEAU WITH ROUTINE HEALING, SUBSEQUENT ENCOUNTER: Primary | ICD-10-CM

## 2022-10-28 PROCEDURE — 97110 THERAPEUTIC EXERCISES: CPT

## 2022-10-28 NOTE — PROGRESS NOTES
Daily Note     Today's date: 10/28/2022  Patient name: Breanne Razo  : 1955  MRN: [id number]  Referring provider: Domo Prieto DO  Dx:   Encounter Diagnosis     ICD-10-CM    1  Closed fracture of right tibial plateau with routine healing, subsequent encounter  E62 039D                   Subjective:   "Im really hurting today"        Objective: See treatment diary below      Assessment: Tolerated treatment well  Patient exhibited good technique with therapeutic exercises      Plan: Continue per plan of care  Precautions WBAT on right LE   Tibial plateau and proximal fibula fx       Manuals 10/20 10/25 10/28 10/11 10/18                                   Neuro Re-Ed                                                                 Ther Ex        Nustep L3 10 min  L3 10 min L3 10 min L2 7 mins   No UE's L3 8 min   TR/HR 30x each  30x each 2x10 2x10 ea 3x10 each   Standing SLR 3-way 2x10 each  20x B/L, each 2x10 B/L, each 2x10 Tomas ea 2x10 each   Side-Stepping 6x8' 6x8' 8x8' 8x8' 8x8'   LAQ 2x10 B/L  2x10 B/L 2x10 B/L 2x10 Tomas 2x10 B/L   Seated HS curls with TB GTB 2x10 R only  GTB 2x10 R GTB  20  b/l     QS        SAQ 2x10 2x10 2x10  B/L     Heel Slides 2x10 R only  2x10 R only 2x10       Seated Marches 2x10 B/L  2x10 B/L 2x10 B/L 2x10 Tomas 2x10 B/L   Seated Hip adduction squeeze 2x10 5" hold  2x10 5" hold 2x10 2x10 5" hold 2x10 5" hold   Standing HS curls 2x10  2x10 2x10 2x10 2x10                           Ther Activity                        Gait Training                        Modalities        Ice r ankle   5'

## 2022-11-01 ENCOUNTER — OFFICE VISIT (OUTPATIENT)
Dept: PHYSICAL THERAPY | Facility: CLINIC | Age: 67
End: 2022-11-01

## 2022-11-01 DIAGNOSIS — S82.141D CLOSED FRACTURE OF RIGHT TIBIAL PLATEAU WITH ROUTINE HEALING, SUBSEQUENT ENCOUNTER: Primary | ICD-10-CM

## 2022-11-01 DIAGNOSIS — M17.12 PRIMARY OSTEOARTHRITIS OF LEFT KNEE: ICD-10-CM

## 2022-11-01 DIAGNOSIS — S82.831D OTHER CLOSED FRACTURE OF PROXIMAL END OF RIGHT FIBULA WITH ROUTINE HEALING, SUBSEQUENT ENCOUNTER: ICD-10-CM

## 2022-11-01 NOTE — PROGRESS NOTES
Daily Note     Today's date: 2022  Patient name: Cammie Cooney  : 1955  MRN: [id number]  Referring provider: Jama Hamilton DO  Dx:   Encounter Diagnosis     ICD-10-CM    1  Closed fracture of right tibial plateau with routine healing, subsequent encounter  S82 141D    2  Other closed fracture of proximal end of right fibula with routine healing, subsequent encounter  S82 831D    3  Primary osteoarthritis of left knee  M17 12                   Subjective: Patient continues to report pain in right ankle  She reports her knee is not too painful today  Objective: See treatment diary below      Assessment: Tolerated treatment well  Patient exhibited good technique with therapeutic exercises      Plan: Continue per plan of care  Precautions WBAT on right LE   Tibial plateau and proximal fibula fx       Manuals 10/20 10/25 10/28 11/1 10/18                                   Neuro Re-Ed                                                                 Ther Ex        Nustep L3 10 min  L3 10 min L3 10 min L2 10 mins   No UE's L3 8 min   TR/HR 30x each  30x each 2x10 30x ea 3x10 each   Standing SLR 3-way 2x10 each  20x B/L, each 2x10 B/L, each 2x10 Tomas ea 2x10 each   Side-Stepping 6x8' 6x8' 8x8' 8x8' 8x8'   LAQ 2x10 B/L  2x10 B/L 2x10 B/L 2x10 Tomas 2x10 B/L   Seated HS curls with TB GTB 2x10 R only  GTB 2x10 R GTB  20  b/l GTB 2x10    QS        SAQ 2x10 2x10 2x10  B/L 2x10 B/L    Heel Slides 2x10 R only  2x10 R only 2x10   2x10 R only    Seated Marches 2x10 B/L  2x10 B/L 2x10 B/L 2x10 Tomas 2x10 B/L   Seated Hip adduction squeeze 2x10 5" hold  2x10 5" hold 2x10 2x10 5" hold 2x10 5" hold   Standing HS curls 2x10  2x10 2x10 2x10 2x10                           Ther Activity                        Gait Training                        Modalities        Ice r ankle   5' 8'

## 2022-11-03 ENCOUNTER — OFFICE VISIT (OUTPATIENT)
Dept: PHYSICAL THERAPY | Facility: CLINIC | Age: 67
End: 2022-11-03

## 2022-11-03 DIAGNOSIS — S82.141D CLOSED FRACTURE OF RIGHT TIBIAL PLATEAU WITH ROUTINE HEALING, SUBSEQUENT ENCOUNTER: Primary | ICD-10-CM

## 2022-11-03 NOTE — PROGRESS NOTES
Daily Note     Today's date: 11/3/2022  Patient name: Elza Garza  : 1955  MRN: [id number]  Referring provider: Vasquez Kelley DO  Dx:   Encounter Diagnosis     ICD-10-CM    1  Closed fracture of right tibial plateau with routine healing, subsequent encounter  L47 215D                   Subjective: Patient reports right le has improved         Objective: See treatment diary below      Assessment: Tolerated treatment well  Patient exhibited good technique with therapeutic exercises      Plan: D/C to [facility] at this time secondary to patient's request         Precautions WBAT on right LE   Tibial plateau and proximal fibula fx       Manuals 10/20 10/25 10/28 11/1 11/3/22                                   Neuro Re-Ed                                                                 Ther Ex        Nustep L3 10 min  L3 10 min L3 10 min L2 10 mins   No UE's L3 10 min   TR/HR 30x each  30x each 2x10 30x ea 3x10 each   Standing SLR 3-way 2x10 each  20x B/L, each 2x10 B/L, each 2x10 Tomas ea 2x10 each   Side-Stepping 6x8' 6x8' 8x8' 8x8' 8x8'   LAQ 2x10 B/L  2x10 B/L 2x10 B/L 2x10 Tomas 2x10 B/L   Seated HS curls with TB GTB 2x10 R only  GTB 2x10 R GTB  20  b/l GTB 2x10 GTB 2x10   QS        SAQ 2x10 2x10 2x10  B/L 2x10 B/L 2x10 b/l   Heel Slides 2x10 R only  2x10 R only 2x10   2x10 R only 2x10 R only   Seated Marches 2x10 B/L  2x10 B/L 2x10 B/L 2x10 Tomas 2x10 B/L   Seated Hip adduction squeeze 2x10 5" hold  2x10 5" hold 2x10 2x10 5" hold 2x10 5" hold   Standing HS curls 2x10  2x10 2x10 2x10 2x10                           Ther Activity                        Gait Training                        Modalities        Ice r ankle   5' 8' 8 min

## 2022-12-21 ENCOUNTER — APPOINTMENT (OUTPATIENT)
Dept: LAB | Facility: CLINIC | Age: 67
End: 2022-12-21

## 2022-12-21 DIAGNOSIS — D64.9 ANEMIA, UNSPECIFIED TYPE: ICD-10-CM

## 2022-12-21 DIAGNOSIS — E78.00 PURE HYPERCHOLESTEROLEMIA: ICD-10-CM

## 2022-12-21 DIAGNOSIS — E55.9 VITAMIN D DEFICIENCY: ICD-10-CM

## 2022-12-21 DIAGNOSIS — I10 ESSENTIAL HYPERTENSION: ICD-10-CM

## 2022-12-21 DIAGNOSIS — R73.9 HYPERGLYCEMIA: ICD-10-CM

## 2022-12-21 DIAGNOSIS — N28.9 RENAL INSUFFICIENCY: ICD-10-CM

## 2022-12-21 DIAGNOSIS — E03.9 ACQUIRED HYPOTHYROIDISM: ICD-10-CM

## 2022-12-21 DIAGNOSIS — E53.8 VITAMIN B12 DEFICIENCY: ICD-10-CM

## 2022-12-21 LAB
25(OH)D3 SERPL-MCNC: 36 NG/ML (ref 30–100)
ALBUMIN SERPL BCP-MCNC: 3.9 G/DL (ref 3.5–5)
ALP SERPL-CCNC: 95 U/L (ref 46–116)
ALT SERPL W P-5'-P-CCNC: 38 U/L (ref 12–78)
ANION GAP SERPL CALCULATED.3IONS-SCNC: 7 MMOL/L (ref 4–13)
AST SERPL W P-5'-P-CCNC: 23 U/L (ref 5–45)
BASOPHILS # BLD AUTO: 0.11 THOUSANDS/ÂΜL (ref 0–0.1)
BASOPHILS NFR BLD AUTO: 1 % (ref 0–1)
BILIRUB SERPL-MCNC: 0.58 MG/DL (ref 0.2–1)
BUN SERPL-MCNC: 18 MG/DL (ref 5–25)
CALCIUM SERPL-MCNC: 9.4 MG/DL (ref 8.3–10.1)
CHLORIDE SERPL-SCNC: 105 MMOL/L (ref 96–108)
CHOLEST SERPL-MCNC: 189 MG/DL
CO2 SERPL-SCNC: 25 MMOL/L (ref 21–32)
CREAT SERPL-MCNC: 1.02 MG/DL (ref 0.6–1.3)
EOSINOPHIL # BLD AUTO: 0.22 THOUSAND/ÂΜL (ref 0–0.61)
EOSINOPHIL NFR BLD AUTO: 2 % (ref 0–6)
ERYTHROCYTE [DISTWIDTH] IN BLOOD BY AUTOMATED COUNT: 14.6 % (ref 11.6–15.1)
GFR SERPL CREATININE-BSD FRML MDRD: 56 ML/MIN/1.73SQ M
GLUCOSE P FAST SERPL-MCNC: 117 MG/DL (ref 65–99)
HCT VFR BLD AUTO: 45.8 % (ref 34.8–46.1)
HDLC SERPL-MCNC: 44 MG/DL
HGB BLD-MCNC: 14.8 G/DL (ref 11.5–15.4)
IMM GRANULOCYTES # BLD AUTO: 0.03 THOUSAND/UL (ref 0–0.2)
IMM GRANULOCYTES NFR BLD AUTO: 0 % (ref 0–2)
INSULIN SERPL-ACNC: 38 MU/L (ref 3–25)
LDLC SERPL CALC-MCNC: 108 MG/DL (ref 0–100)
LYMPHOCYTES # BLD AUTO: 3.27 THOUSANDS/ÂΜL (ref 0.6–4.47)
LYMPHOCYTES NFR BLD AUTO: 32 % (ref 14–44)
MCH RBC QN AUTO: 30.8 PG (ref 26.8–34.3)
MCHC RBC AUTO-ENTMCNC: 32.3 G/DL (ref 31.4–37.4)
MCV RBC AUTO: 95 FL (ref 82–98)
MONOCYTES # BLD AUTO: 0.7 THOUSAND/ÂΜL (ref 0.17–1.22)
MONOCYTES NFR BLD AUTO: 7 % (ref 4–12)
NEUTROPHILS # BLD AUTO: 5.91 THOUSANDS/ÂΜL (ref 1.85–7.62)
NEUTS SEG NFR BLD AUTO: 58 % (ref 43–75)
NONHDLC SERPL-MCNC: 145 MG/DL
NRBC BLD AUTO-RTO: 0 /100 WBCS
PLATELET # BLD AUTO: 244 THOUSANDS/UL (ref 149–390)
PMV BLD AUTO: 11.1 FL (ref 8.9–12.7)
POTASSIUM SERPL-SCNC: 4.3 MMOL/L (ref 3.5–5.3)
PROT SERPL-MCNC: 8 G/DL (ref 6.4–8.4)
RBC # BLD AUTO: 4.8 MILLION/UL (ref 3.81–5.12)
SODIUM SERPL-SCNC: 137 MMOL/L (ref 135–147)
TRIGL SERPL-MCNC: 187 MG/DL
TSH SERPL DL<=0.05 MIU/L-ACNC: 4.17 UIU/ML (ref 0.45–4.5)
VIT B12 SERPL-MCNC: 459 PG/ML (ref 100–900)
WBC # BLD AUTO: 10.24 THOUSAND/UL (ref 4.31–10.16)

## 2022-12-22 LAB
EST. AVERAGE GLUCOSE BLD GHB EST-MCNC: 108 MG/DL
HBA1C MFR BLD: 5.4 %

## 2023-03-10 ENCOUNTER — APPOINTMENT (OUTPATIENT)
Dept: RADIOLOGY | Facility: CLINIC | Age: 68
End: 2023-03-10

## 2023-03-10 ENCOUNTER — HOSPITAL ENCOUNTER (INPATIENT)
Facility: HOSPITAL | Age: 68
LOS: 1 days | Discharge: ANOTHER HOSPITAL | End: 2023-03-11
Attending: EMERGENCY MEDICINE | Admitting: FAMILY MEDICINE

## 2023-03-10 ENCOUNTER — OFFICE VISIT (OUTPATIENT)
Dept: URGENT CARE | Facility: CLINIC | Age: 68
End: 2023-03-10

## 2023-03-10 VITALS
BODY MASS INDEX: 43.04 KG/M2 | HEIGHT: 68 IN | DIASTOLIC BLOOD PRESSURE: 57 MMHG | SYSTOLIC BLOOD PRESSURE: 121 MMHG | HEART RATE: 46 BPM | TEMPERATURE: 98.9 F | OXYGEN SATURATION: 98 % | RESPIRATION RATE: 20 BRPM | WEIGHT: 284 LBS

## 2023-03-10 DIAGNOSIS — R00.1 JUNCTIONAL BRADYCARDIA: ICD-10-CM

## 2023-03-10 DIAGNOSIS — R05.1 ACUTE COUGH: ICD-10-CM

## 2023-03-10 DIAGNOSIS — R35.0 URINE FREQUENCY: ICD-10-CM

## 2023-03-10 DIAGNOSIS — I49.5 SICK SINUS SYNDROME (HCC): ICD-10-CM

## 2023-03-10 DIAGNOSIS — R00.1 BRADYCARDIA: Primary | ICD-10-CM

## 2023-03-10 DIAGNOSIS — R94.31 ABNORMAL EKG: ICD-10-CM

## 2023-03-10 PROBLEM — E66.01 MORBID OBESITY WITH BMI OF 40.0-44.9, ADULT (HCC): Status: ACTIVE | Noted: 2021-03-17

## 2023-03-10 LAB
2HR DELTA HS TROPONIN: 2 NG/L
4HR DELTA HS TROPONIN: 1 NG/L
ALBUMIN SERPL BCP-MCNC: 4.4 G/DL (ref 3.5–5)
ALP SERPL-CCNC: 68 U/L (ref 34–104)
ALT SERPL W P-5'-P-CCNC: 17 U/L (ref 7–52)
ANION GAP SERPL CALCULATED.3IONS-SCNC: 7 MMOL/L (ref 4–13)
AST SERPL W P-5'-P-CCNC: 18 U/L (ref 13–39)
BASOPHILS # BLD AUTO: 0.08 THOUSANDS/ÂΜL (ref 0–0.1)
BASOPHILS NFR BLD AUTO: 1 % (ref 0–1)
BILIRUB SERPL-MCNC: 0.64 MG/DL (ref 0.2–1)
BUN SERPL-MCNC: 15 MG/DL (ref 5–25)
CALCIUM SERPL-MCNC: 9.7 MG/DL (ref 8.4–10.2)
CARDIAC TROPONIN I PNL SERPL HS: 7 NG/L
CARDIAC TROPONIN I PNL SERPL HS: 8 NG/L
CARDIAC TROPONIN I PNL SERPL HS: 9 NG/L
CHLORIDE SERPL-SCNC: 105 MMOL/L (ref 96–108)
CO2 SERPL-SCNC: 28 MMOL/L (ref 21–32)
CREAT SERPL-MCNC: 0.97 MG/DL (ref 0.6–1.3)
EOSINOPHIL # BLD AUTO: 0.37 THOUSAND/ÂΜL (ref 0–0.61)
EOSINOPHIL NFR BLD AUTO: 4 % (ref 0–6)
ERYTHROCYTE [DISTWIDTH] IN BLOOD BY AUTOMATED COUNT: 14.4 % (ref 11.6–15.1)
GFR SERPL CREATININE-BSD FRML MDRD: 59 ML/MIN/1.73SQ M
GLUCOSE SERPL-MCNC: 85 MG/DL (ref 65–140)
HCT VFR BLD AUTO: 46.7 % (ref 34.8–46.1)
HGB BLD-MCNC: 14.9 G/DL (ref 11.5–15.4)
IMM GRANULOCYTES # BLD AUTO: 0.01 THOUSAND/UL (ref 0–0.2)
IMM GRANULOCYTES NFR BLD AUTO: 0 % (ref 0–2)
LYMPHOCYTES # BLD AUTO: 2.85 THOUSANDS/ÂΜL (ref 0.6–4.47)
LYMPHOCYTES NFR BLD AUTO: 33 % (ref 14–44)
MAGNESIUM SERPL-MCNC: 2.3 MG/DL (ref 1.9–2.7)
MCH RBC QN AUTO: 31 PG (ref 26.8–34.3)
MCHC RBC AUTO-ENTMCNC: 31.9 G/DL (ref 31.4–37.4)
MCV RBC AUTO: 97 FL (ref 82–98)
MONOCYTES # BLD AUTO: 0.73 THOUSAND/ÂΜL (ref 0.17–1.22)
MONOCYTES NFR BLD AUTO: 8 % (ref 4–12)
NEUTROPHILS # BLD AUTO: 4.66 THOUSANDS/ÂΜL (ref 1.85–7.62)
NEUTS SEG NFR BLD AUTO: 54 % (ref 43–75)
NRBC BLD AUTO-RTO: 0 /100 WBCS
PLATELET # BLD AUTO: 184 THOUSANDS/UL (ref 149–390)
PMV BLD AUTO: 11.8 FL (ref 8.9–12.7)
POTASSIUM SERPL-SCNC: 4 MMOL/L (ref 3.5–5.3)
PROT SERPL-MCNC: 7.8 G/DL (ref 6.4–8.4)
QRS AXIS: 36 DEGREES
QRSD INTERVAL: 88 MS
QT INTERVAL: 462 MS
QTC INTERVAL: 385 MS
RBC # BLD AUTO: 4.81 MILLION/UL (ref 3.81–5.12)
SL AMB  POCT GLUCOSE, UA: ABNORMAL
SL AMB LEUKOCYTE ESTERASE,UA: ABNORMAL
SL AMB POCT BILIRUBIN,UA: ABNORMAL
SL AMB POCT BLOOD,UA: ABNORMAL
SL AMB POCT CLARITY,UA: CLEAR
SL AMB POCT COLOR,UA: YELLOW
SL AMB POCT KETONES,UA: ABNORMAL
SL AMB POCT NITRITE,UA: ABNORMAL
SL AMB POCT PH,UA: 6.5
SL AMB POCT SPECIFIC GRAVITY,UA: 1010
SL AMB POCT URINE PROTEIN: ABNORMAL
SL AMB POCT UROBILINOGEN: 0.2
SODIUM SERPL-SCNC: 140 MMOL/L (ref 135–147)
T WAVE AXIS: 42 DEGREES
T4 FREE SERPL-MCNC: 1.04 NG/DL (ref 0.76–1.46)
TSH SERPL DL<=0.05 MIU/L-ACNC: 5.18 UIU/ML (ref 0.45–4.5)
VENTRICULAR RATE: 42 BPM
WBC # BLD AUTO: 8.7 THOUSAND/UL (ref 4.31–10.16)

## 2023-03-10 RX ORDER — FLUTICASONE PROPIONATE 50 MCG
1 SPRAY, SUSPENSION (ML) NASAL 2 TIMES DAILY
Status: DISCONTINUED | OUTPATIENT
Start: 2023-03-10 | End: 2023-03-11 | Stop reason: HOSPADM

## 2023-03-10 RX ORDER — LEVOTHYROXINE SODIUM 0.15 MG/1
150 TABLET ORAL
Status: DISCONTINUED | OUTPATIENT
Start: 2023-03-11 | End: 2023-03-11 | Stop reason: HOSPADM

## 2023-03-10 RX ORDER — ASCORBIC ACID 500 MG
500 TABLET ORAL DAILY
Status: DISCONTINUED | OUTPATIENT
Start: 2023-03-11 | End: 2023-03-11 | Stop reason: HOSPADM

## 2023-03-10 RX ORDER — LORATADINE 10 MG/1
10 TABLET ORAL DAILY
Status: DISCONTINUED | OUTPATIENT
Start: 2023-03-11 | End: 2023-03-11 | Stop reason: HOSPADM

## 2023-03-10 RX ORDER — ASPIRIN 81 MG/1
81 TABLET ORAL DAILY
Status: DISCONTINUED | OUTPATIENT
Start: 2023-03-11 | End: 2023-03-11 | Stop reason: HOSPADM

## 2023-03-10 RX ORDER — MELATONIN
2000 DAILY
Status: DISCONTINUED | OUTPATIENT
Start: 2023-03-11 | End: 2023-03-11 | Stop reason: HOSPADM

## 2023-03-10 RX ORDER — ACETAMINOPHEN 325 MG/1
650 TABLET ORAL EVERY 6 HOURS PRN
Status: DISCONTINUED | OUTPATIENT
Start: 2023-03-10 | End: 2023-03-11 | Stop reason: HOSPADM

## 2023-03-10 RX ORDER — ATROPINE SULFATE 1 MG/ML
0.4 INJECTION, SOLUTION INTRAVENOUS DAILY PRN
Status: DISCONTINUED | OUTPATIENT
Start: 2023-03-10 | End: 2023-03-11 | Stop reason: HOSPADM

## 2023-03-10 RX ORDER — ZINC SULFATE 50(220)MG
220 CAPSULE ORAL DAILY
Status: DISCONTINUED | OUTPATIENT
Start: 2023-03-11 | End: 2023-03-11 | Stop reason: HOSPADM

## 2023-03-10 RX ORDER — ATORVASTATIN CALCIUM 40 MG/1
40 TABLET, FILM COATED ORAL DAILY
Status: DISCONTINUED | OUTPATIENT
Start: 2023-03-11 | End: 2023-03-11 | Stop reason: HOSPADM

## 2023-03-10 RX ORDER — LANOLIN ALCOHOL/MO/W.PET/CERES
400 CREAM (GRAM) TOPICAL
Status: DISCONTINUED | OUTPATIENT
Start: 2023-03-10 | End: 2023-03-11 | Stop reason: HOSPADM

## 2023-03-10 RX ORDER — ATROPINE SULFATE 0.4 MG/ML
0.4 AMPUL (ML) INJECTION DAILY PRN
Status: DISCONTINUED | OUTPATIENT
Start: 2023-03-10 | End: 2023-03-10 | Stop reason: RX

## 2023-03-10 RX ADMIN — Medication 400 MG: at 21:09

## 2023-03-10 RX ADMIN — FLUTICASONE PROPIONATE 1 SPRAY: 50 SPRAY, METERED NASAL at 17:53

## 2023-03-10 NOTE — PROGRESS NOTES
[address] [facility]        NAME: Abraham Corona is a 70 y o  female  : 1955    MRN: [id number]  DATE: March 10, 2023  TIME: 9:57 AM    Assessment and Plan   Bradycardia [R00 1]  1  Bradycardia        2  Acute cough  ECG 12 lead    XR chest pa & lateral    XR chest pa & lateral      3  Urine frequency  POCT urine dip            Patient Instructions   There are no Patient Instructions on file for this visit  Follow up with PCP in 3-5 days  Proceed to  ER if symptoms worsen  Chief Complaint     Chief Complaint   Patient presents with   • Nasal Congestion   • Earache   • Cough         History of Present Illness       The patient presents to the clinic complaining of nasal congestion, sinus pressure, sinus pain, cough, and shortness of breath since Wednesday  She is also complaining of ear pain  She states that her grandchildren have recently been sick with strep throat and sinus infections  She is not vaccinated against COVID  She does not want a COVID test today  She does have a history of tobacco dependence and was previously worked up by cardiology in  and had a negative stress test done at that time  She is currently not on any beta-blockers  She does complain of dyspnea on exertion  He denies headache, dizziness, or feeling of syncope  Review of Systems   Review of Systems   Constitutional: Negative for chills and fever  HENT: Positive for congestion, rhinorrhea, sinus pressure and sinus pain  Negative for ear pain and sore throat  Eyes: Negative for pain and visual disturbance  Respiratory: Positive for cough and shortness of breath  Negative for wheezing and stridor  Cardiovascular: Negative for chest pain, palpitations and leg swelling  Gastrointestinal: Negative for abdominal pain, diarrhea and vomiting  Genitourinary: Negative for dysuria and hematuria  Musculoskeletal: Negative for arthralgias and back pain  Skin: Negative for color change and rash  Neurological: Negative for dizziness, seizures, syncope and headaches  All other systems reviewed and are negative          Current Medications       Current Outpatient Medications:   •  ascorbic acid (VITAMIN C) 250 mg tablet, Take 500 mg by mouth daily, Disp: , Rfl:   •  aspirin (ECOTRIN LOW STRENGTH) 81 mg EC tablet, Take 1 tablet (81 mg total) by mouth daily, Disp: 90 tablet, Rfl: 1  •  atorvastatin (LIPITOR) 40 mg tablet, Take 1 tablet (40 mg total) by mouth daily, Disp: 90 tablet, Rfl: 3  •  cholecalciferol (VITAMIN D3) 1,000 units tablet, Take 2 tablets (2,000 Units total) by mouth daily, Disp: 90 tablet, Rfl: 1  •  diclofenac sodium (VOLTAREN) 50 mg EC tablet, Take 1 tablet (50 mg total) by mouth 2 (two) times a day, Disp: 180 tablet, Rfl: 1  •  fluticasone (FLONASE) 50 mcg/act nasal spray, 1 spray each nostril 2 x daily prn congestion, Disp: 18 2 mL, Rfl: 5  •  ketoconazole (NIZORAL) 2 % cream, Apply topically 2 (two) times a day as needed for itching, irritation or rash, Disp: 60 g, Rfl: 5  •  levothyroxine 150 mcg tablet, Take 1 tablet (150 mcg total) by mouth daily in the early morning, Disp: 90 tablet, Rfl: 1  •  loratadine (CLARITIN) 10 mg tablet, 1 tab po daily x 14 days then prn allergies, Disp: 30 tablet, Rfl: 3  •  magnesium oxide (MAG-OX) 400 mg, Take 1 tablet (400 mg total) by mouth daily at bedtime, Disp: 90 tablet, Rfl: 1  •  pantoprazole (PROTONIX) 40 mg tablet, Take 1 tablet (40 mg total) by mouth daily, Disp: 90 tablet, Rfl: 1  •  RA CALCIUM 600/VITAMIN D-3 600-400 MG-UNIT TABS, Take 1 tablet by mouth 2 (two) times a day, Disp: , Rfl: 0  •  triamterene-hydrochlorothiazide (DYAZIDE) 37 5-25 mg per capsule, Take 1 capsule by mouth every morning, Disp: 90 capsule, Rfl: 1  •  vitamin B-12 (VITAMIN B-12) 1,000 mcg tablet, TAKE 1 TABLET BY MOUTH EVERY OTHER DAY, Disp: 90 tablet, Rfl: 1  •  Zinc 25 MG TABS, Take 1 tablet by mouth daily, Disp: , Rfl:     Current Allergies     Allergies as of 03/10/2023   • (No Known Allergies)            The following portions of the patient's history were reviewed and updated as appropriate: allergies, current medications, past family history, past medical history, past social history, past surgical history and problem list      Past Medical History:   Diagnosis Date   • Allergic rhinitis    • Carpal tunnel syndrome on both sides    • Cervical disc herniation    • DVT (deep venous thrombosis) ([address] )     DVT Left Leg   • Fracture of foot     left casted   • History of mammogram 2017    Being followed by Dr Trino Venegas   • History of Papanicolaou smear of cervix 2016    Dr Trino Venegas   • Hx of colonoscopy 2016    Dr Deep Chen   • Hyperlipemia    • Hypertension    • Hypothyroidism    • Lichen sclerosus    • Renal calculi    • Rotator cuff tear, left        Past Surgical History:   Procedure Laterality Date   • CERVICAL LAMINECTOMY N/A    • CHOLECYSTECTOMY N/A    • KNEE CARTILAGE SURGERY Bilateral    • LITHOTRIPSY N/A    • NEUROPLASTY / TRANSPOSITION MEDIAN NERVE AT CARPAL TUNNEL BILATERAL Bilateral    • ROTATOR CUFF REPAIR Left    • TONSILLECTOMY AND ADENOIDECTOMY Bilateral    • TONSILLECTOMY AND ADENOIDECTOMY     • TUBAL LIGATION N/A    • US GUIDED THYROID BIOPSY  11/2018   • WRIST FRACTURE SURGERY Right     plates and pins        Family History   Problem Relation Age of Onset   • Hypertension Mother    • Hyperlipidemia Mother    • Hypothyroidism Mother    • Brain cancer Mother    • Hypertension Father    • Celiac disease Father    • Ovarian cancer Sister    • Celiac disease Brother    • Hypothyroidism Brother    • Diabetes Paternal Grandmother    • Hypothyroidism Sister    • Coronary artery disease Brother    • Hypertension Brother    • Hyperlipidemia Brother    • Diabetes Sister          Medications have been verified          Objective   /57   Pulse (!) 46   Temp 98 9 °F (37 2 °C)   Resp 20   Ht 5' 8" (1 727 m)   Wt 129 kg (284 lb)   LMP 01/01/2004   SpO2 98%   BMI 43 18 kg/m²        Physical Exam     Physical Exam  Constitutional:       Appearance: She is not ill-appearing, toxic-appearing or diaphoretic  HENT:      Right Ear: Tympanic membrane normal       Nose: No congestion or rhinorrhea  Mouth/Throat:      Pharynx: No posterior oropharyngeal erythema  Eyes:      General:         Left eye: No discharge  Pupils: Pupils are equal, round, and reactive to light  Cardiovascular:      Rate and Rhythm: Bradycardia present  Comments: Distant heart sounds are noted  Pulmonary:      Effort: Pulmonary effort is normal       Breath sounds: Rhonchi present  No wheezing or rales  Chest:      Chest wall: No tenderness  Abdominal:      Palpations: There is no mass  Musculoskeletal:         General: No swelling or tenderness  Left lower leg: No edema  Skin:     Coloration: Skin is not pale  Neurological:      General: No focal deficit present  Mental Status: She is alert  Sensory: No sensory deficit  Motor: No weakness        Coordination: Coordination normal       Deep Tendon Reflexes: Reflexes normal    Psychiatric:         Mood and Affect: Mood normal              -Chest x-ray did not show any signs of infiltrate   -I suggest the patient based further further evaluated in the ER via EMS due to symptomatic bradycardia

## 2023-03-10 NOTE — PLAN OF CARE
Problem: Potential for Falls  Goal: Patient will remain free of falls  Description: INTERVENTIONS:  - Educate patient/family on patient safety including physical limitations  - Instruct patient to call for assistance with activity   - Consult OT/PT to assist with strengthening/mobility   - Keep Call bell within reach  - Keep bed low and locked with side rails adjusted as appropriate  - Keep care items and personal belongings within reach  - Initiate and maintain comfort rounds  - Make Fall Risk Sign visible to staff  - Obtain necessary fall risk management equipment:   - Apply yellow socks and bracelet for high fall risk patients  - Consider moving patient to room near nurses station  3/10/2023 1715 by Bryanna Christianson RN  Outcome: Progressing  3/10/2023 1714 by Bryanna Christianson RN  Outcome: Progressing     Problem: PAIN - ADULT  Goal: Verbalizes/displays adequate comfort level or baseline comfort level  Description: Interventions:  - Encourage patient to monitor pain and request assistance  - Assess pain using appropriate pain scale  - Administer analgesics based on type and severity of pain and evaluate response  - Implement non-pharmacological measures as appropriate and evaluate response  - Consider cultural and social influences on pain and pain management  - Notify physician/advanced practitioner if interventions unsuccessful or patient reports new pain  Outcome: Progressing     Problem: INFECTION - ADULT  Goal: Absence or prevention of progression during hospitalization  Description: INTERVENTIONS:  - Assess and monitor for signs and symptoms of infection  - Monitor lab/diagnostic results  - Monitor all insertion sites, i e  indwelling lines, tubes, and drains  - Monitor endotracheal if appropriate and nasal secretions for changes in amount and color  - [place] appropriate cooling/warming therapies per order  - Administer medications as ordered  - Instruct and encourage patient and family to use good hand hygiene technique  - Identify and instruct in appropriate isolation precautions for identified infection/condition  Outcome: Progressing     Problem: SAFETY ADULT  Goal: Patient will remain free of falls  Description: INTERVENTIONS:  - Educate patient/family on patient safety including physical limitations  - Instruct patient to call for assistance with activity   - Consult OT/PT to assist with strengthening/mobility   - Keep Call bell within reach  - Keep bed low and locked with side rails adjusted as appropriate  - Keep care items and personal belongings within reach  - Initiate and maintain comfort rounds  - Make Fall Risk Sign visible to staff  - Apply yellow socks and bracelet for high fall risk patients  - Consider moving patient to room near nurses station  3/10/2023 1715 by Briana Jane RN  Outcome: Progressing  3/10/2023 1714 by Briana Jane RN  Outcome: Progressing  Goal: Maintain or return to baseline ADL function  Description: INTERVENTIONS:  -  Assess patient's ability to carry out ADLs; assess patient's baseline for ADL function and identify physical deficits which impact ability to perform ADLs (bathing, care of mouth/teeth, toileting, grooming, dressing, etc )  - Assess/evaluate cause of self-care deficits   - Assess range of motion  - Assess patient's mobility; develop plan if impaired  - Assess patient's need for assistive devices and provide as appropriate  - Encourage maximum independence but intervene and supervise when necessary  - Involve family in performance of ADLs  - Assess for home care needs following discharge   - Consider OT consult to assist with ADL evaluation and planning for discharge  - Provide patient education as appropriate  Outcome: Progressing  Goal: Maintains/Returns to pre admission functional level  Description: INTERVENTIONS:  - Perform BMAT or MOVE assessment daily    - Set and communicate daily mobility goal to care team and patient/family/caregiver  - Collaborate with rehabilitation services on mobility goals if consulted  - Out of bed for toileting  - Record patient progress and toleration of activity level   Outcome: Progressing     Problem: DISCHARGE PLANNING  Goal: Discharge to home or other facility with appropriate resources  Description: INTERVENTIONS:  - Identify barriers to discharge w/patient and caregiver  - Arrange for needed discharge resources and transportation as appropriate  - Identify discharge learning needs (meds, wound care, etc )  - Arrange for interpretive services to assist at discharge as needed  - Refer to Case Management Department for coordinating discharge planning if the patient needs post-hospital services based on physician/advanced practitioner order or complex needs related to functional status, cognitive ability, or social support system  Outcome: Progressing     Problem: Knowledge Deficit  Goal: Patient/family/caregiver demonstrates understanding of disease process, treatment plan, medications, and discharge instructions  Description: Complete learning assessment and assess knowledge base    Interventions:  - Provide teaching at level of understanding  - Provide teaching via preferred learning methods  Outcome: Progressing

## 2023-03-10 NOTE — ED PROVIDER NOTES
History  Chief Complaint   Patient presents with   • Slow Heart Rate     Pt  Seen at urgent care today for a productive cough for the past few days, found to have a heart rate in the 40's, asymptomatic, denies chest pain or sob, denies n/v/d, denies fatigue or dizziness, pt  Has no history of bradycardia      76-year-old female sent to us from urgent care today because of "low heart rate "  Patient had presented herself to urgent care center because of sinus congestion, patient felt as if she had a "head cold  "Patient was asymptomatic with regards to her heart rate  History provided by:  Patient  Medical Problem - Major  Location:  Low heart rate  Severity:  Unable to specify  Timing:  Unable to specify  Associated symptoms: congestion and shortness of breath    Associated symptoms: no ear pain and no wheezing        Prior to Admission Medications   Prescriptions Last Dose Informant Patient Reported? Taking?    RA CALCIUM 600/VITAMIN D-3 600-400 MG-UNIT TABS   Yes Yes   Sig: Take 1 tablet by mouth 2 (two) times a day   Zinc 25 MG TABS   Yes Yes   Sig: Take 1 tablet by mouth daily   ascorbic acid (VITAMIN C) 250 mg tablet   Yes Yes   Sig: Take 500 mg by mouth daily   aspirin (ECOTRIN LOW STRENGTH) 81 mg EC tablet   No Yes   Sig: Take 1 tablet (81 mg total) by mouth daily   atorvastatin (LIPITOR) 40 mg tablet   No Yes   Sig: Take 1 tablet (40 mg total) by mouth daily   cholecalciferol (VITAMIN D3) 1,000 units tablet   No Yes   Sig: Take 2 tablets (2,000 Units total) by mouth daily   diclofenac sodium (VOLTAREN) 50 mg EC tablet   No Yes   Sig: Take 1 tablet (50 mg total) by mouth 2 (two) times a day   fluticasone (FLONASE) 50 mcg/act nasal spray   No Yes   Si spray each nostril 2 x daily prn congestion   ketoconazole (NIZORAL) 2 % cream   No Yes   Sig: Apply topically 2 (two) times a day as needed for itching, irritation or rash   levothyroxine 150 mcg tablet   No Yes   Sig: Take 1 tablet (150 mcg total) by mouth daily in the early morning   loratadine (CLARITIN) 10 mg tablet   No Yes   Si tab po daily x 14 days then prn allergies   magnesium oxide (MAG-OX) 400 mg   No Yes   Sig: Take 1 tablet (400 mg total) by mouth daily at bedtime   pantoprazole (PROTONIX) 40 mg tablet   No Yes   Sig: Take 1 tablet (40 mg total) by mouth daily   triamterene-hydrochlorothiazide (DYAZIDE) 37 5-25 mg per capsule   No Yes   Sig: Take 1 capsule by mouth every morning   vitamin B-12 (VITAMIN B-12) 1,000 mcg tablet   No Yes   Sig: TAKE 1 TABLET BY MOUTH EVERY OTHER DAY      Facility-Administered Medications: None       Past Medical History:   Diagnosis Date   • Allergic rhinitis    • Carpal tunnel syndrome on both sides    • Cervical disc herniation    • DVT (deep venous thrombosis) (HCC)     DVT Left Leg   • Fracture of foot     left casted   • History of mammogram 2017    Being followed by Dr Narcisa Bush   • History of Papanicolaou smear of cervix     Dr Narcisa Bush   • Hx of colonoscopy     Dr Etelvina Villegas   • Hyperlipemia    • Hypertension    • Hypothyroidism    • Lichen sclerosus    • Renal calculi    • Rotator cuff tear, left        Past Surgical History:   Procedure Laterality Date   • CERVICAL LAMINECTOMY N/A    • CHOLECYSTECTOMY N/A    • KNEE CARTILAGE SURGERY Bilateral    • LITHOTRIPSY N/A    • NEUROPLASTY / TRANSPOSITION MEDIAN NERVE AT CARPAL TUNNEL BILATERAL Bilateral    • ROTATOR CUFF REPAIR Left    • TONSILLECTOMY AND ADENOIDECTOMY Bilateral    • TONSILLECTOMY AND ADENOIDECTOMY     • TUBAL LIGATION N/A    • US GUIDED THYROID BIOPSY  2018   • WRIST FRACTURE SURGERY Right     plates and pins        Family History   Problem Relation Age of Onset   • Hypertension Mother    • Hyperlipidemia Mother    • Hypothyroidism Mother    • Brain cancer Mother    • Hypertension Father    • Celiac disease Father    • Ovarian cancer Sister    • Celiac disease Brother    • Hypothyroidism Brother    • Diabetes Paternal Grandmother    • Hypothyroidism Sister    • Coronary artery disease Brother    • Hypertension Brother    • Hyperlipidemia Brother    • Diabetes Sister      I have reviewed and agree with the history as documented  E-Cigarette/Vaping   • E-Cigarette Use Never User      E-Cigarette/Vaping Substances   • Nicotine No    • THC No    • CBD No    • Flavoring No    • Other No    • Unknown No      Social History     Tobacco Use   • Smoking status: Some Days     Packs/day: 0 30     Years: 10 00     Pack years: 3 00     Types: Cigarettes     Last attempt to quit: 2019     Years since quittin 0   • Smokeless tobacco: Never   • Tobacco comments:     quite date 1 year   Vaping Use   • Vaping Use: Never used   Substance Use Topics   • Alcohol use: No   • Drug use: No       Review of Systems   Constitutional: Negative  HENT: Positive for congestion and sinus pressure  Negative for ear discharge, ear pain, facial swelling, sinus pain and trouble swallowing  Respiratory: Positive for shortness of breath  Negative for wheezing  Cardiovascular: Negative  Gastrointestinal: Negative  Genitourinary: Negative  Musculoskeletal: Negative  All other systems reviewed and are negative  Physical Exam  Physical Exam  Vitals and nursing note reviewed  Constitutional:       General: She is awake  Appearance: Normal appearance  She is well-developed  She is obese  She is not ill-appearing or toxic-appearing  HENT:      Head: Normocephalic and atraumatic  Right Ear: External ear normal       Left Ear: External ear normal       Nose: Nose normal       Mouth/Throat:      Mouth: Mucous membranes are moist    Eyes:      General: Lids are normal  No scleral icterus  Extraocular Movements: Extraocular movements intact  Pupils: Pupils are equal, round, and reactive to light  Cardiovascular:      Rate and Rhythm: Regular rhythm  Bradycardia present  Heart sounds: Normal heart sounds   No murmur heard   Pulmonary:      Effort: Pulmonary effort is normal  No respiratory distress  Breath sounds: Normal breath sounds  No wheezing, rhonchi or rales  Abdominal:      General: Abdomen is flat  Palpations: Abdomen is soft  Musculoskeletal:         General: No swelling, tenderness or deformity  Normal range of motion  Cervical back: Normal range of motion and neck supple  Skin:     General: Skin is warm and dry  Coloration: Skin is not jaundiced or pale  Findings: No rash  Neurological:      Mental Status: She is alert and oriented to person, place, and time  Mental status is at baseline  Cranial Nerves: No cranial nerve deficit  Motor: No weakness  Psychiatric:         Attention and Perception: Attention normal          Mood and Affect: Mood normal          Speech: Speech normal          Behavior: Behavior normal          Thought Content: Thought content normal          Judgment: Judgment normal          Vital Signs  ED Triage Vitals [03/10/23 1118]   Temperature Pulse Respirations Blood Pressure SpO2   (!) 96 8 °F (36 °C) (!) 43 20 146/65 98 %      Temp Source Heart Rate Source Patient Position - Orthostatic VS BP Location FiO2 (%)   Tympanic Monitor Sitting Right arm --      Pain Score       No Pain           Vitals:    03/10/23 1230 03/10/23 1237 03/10/23 1238 03/10/23 1300   BP: 106/59   130/60   Pulse: 55 (!) 37 (!) 45 (!) 38   Patient Position - Orthostatic VS:             Visual Acuity      ED Medications  Medications - No data to display    Diagnostic Studies  Results Reviewed     Procedure Component Value Units Date/Time    TSH, 3rd generation with Free T4 reflex [[id number]] Collected: 03/10/23 1315    Lab Status: In process Specimen: Blood from Line, Venous Updated: 03/10/23 1317    Comprehensive metabolic panel [[id number]] Collected: 03/10/23 1315    Lab Status:  In process Specimen: Blood from Line, Venous Updated: 03/10/23 1317    Magnesium [[id number]] Collected: 03/10/23 1315    Lab Status: In process Specimen: Blood from Line, Venous Updated: 03/10/23 1317    HS Troponin I 2hr [[id number]] Collected: 03/10/23 1315    Lab Status:  In process Specimen: Blood from Arm, Left Updated: 03/10/23 1317    HS Troponin 0hr (reflex protocol) [[id number]]  (Normal) Collected: 03/10/23 1220    Lab Status: Final result Specimen: Blood from Line, Venous Updated: 03/10/23 1254     hs TnI 0hr 7 ng/L     CBC and differential [[id number]]  (Abnormal) Collected: 03/10/23 1220    Lab Status: Final result Specimen: Blood from Line, Venous Updated: 03/10/23 1230     WBC 8 70 Thousand/uL      RBC 4 81 Million/uL      Hemoglobin 14 9 g/dL      Hematocrit 46 7 %      MCV 97 fL      MCH 31 0 pg      MCHC 31 9 g/dL      RDW 14 4 %      MPV 11 8 fL      Platelets 084 Thousands/uL      nRBC 0 /100 WBCs      Neutrophils Relative 54 %      Immat GRANS % 0 %      Lymphocytes Relative 33 %      Monocytes Relative 8 %      Eosinophils Relative 4 %      Basophils Relative 1 %      Neutrophils Absolute 4 66 Thousands/µL      Immature Grans Absolute 0 01 Thousand/uL      Lymphocytes Absolute 2 85 Thousands/µL      Monocytes Absolute 0 73 Thousand/µL      Eosinophils Absolute 0 37 Thousand/µL      Basophils Absolute 0 08 Thousands/µL                  No orders to display              Procedures  ECG 12 Lead Documentation Only    Date/Time: 3/10/2023 12:39 PM  Performed by: Collette Ferrer DO  Authorized by: Collette Ferrer DO     Indications / Diagnosis:  Chest pain  ECG reviewed by me, the ED Provider: yes    Patient location:  ED  Previous ECG:     Previous ECG:  Unavailable  Interpretation:     Interpretation: abnormal    Rate:     ECG rate assessment: bradycardic    Rhythm:     Rhythm: junctional    Ectopy:     Ectopy: none    QRS:     QRS axis:  Normal  Conduction:     Conduction: normal    ST segments:     ST segments:  Non-specific  T waves:     T waves: non-specific               ED Course  ED Course as of 03/10/23 1331   Fri Mar 10, 2023   1211 CXR negative   1239 Patient being seen and evaluated by cardiology in the emergency department  patient did have her heart rate increase to 65  Audiology is recommending admission with planned pacemaker placement [facility] on Monday  1240 Patient currently has converted back to a sinus bradycardia  Discuss further with cardiology  They are recommending admission and pacemaker placement   1306 hs TnI 0hr: 7                               SBIRT 22yo+    Flowsheet Row Most Recent Value   SBIRT (23 yo +)    In order to provide better care to our patients, we are screening all of our patients for alcohol and drug use  Would it be okay to ask you these screening questions? No Filed at: 03/10/2023 1122                    Medical Decision Making  Patient presented to the emergency department and a MSE was performed  The patient was evaluated for complaint related to acute bradycardia  Patient is potentially at risk for, but not limited to, complete heart block, junctional rhythm, sick sinus syndrome, CAD, CHF, or electrolyte abnormality  Several of these diagnoses have been evaluated and ruled out by history and physical   As needed, patient will be further evaluated with laboratory and imaging studies  Higher level diagnostics, such as CT imaging or ultrasound, may also be required  Please see work-up portion of the note for further evaluation of patient's risk  Socioeconomic factors were also considered as part of the decision-making process  Unless otherwise stated in the chart or patient is admitted as elsewhere documented, any previously prescribed medications will be maintained  Bradycardia: complicated acute illness or injury with systemic symptoms  Junctional bradycardia: complicated acute illness or injury with systemic symptoms  Amount and/or Complexity of Data Reviewed  Labs: ordered   Decision-making details documented in ED Course  Risk  Prescription drug management  Decision regarding hospitalization  Risk Details: Patient presented to the emergency department and a MSE was performed  The patient was evaluated and diagnosed with acute bradycardia with junctional rhythm and sick sinus syndrome  This is a new issue that will require additional planned work-up and treatment in a hospitalized setting  As may have been required as part of this evaluation, clinical laboratory test, radiology imaging and medical testing (I e  EKG) were ordered as necessitated by the patient's presentation  I independently reviewed these studies, imaging and testing  This patient's case is considered to be a considerable risk secondary to the above listed disease process and poses a threat to the patient's well-being and baseline function  Further in-patient diagnostic testing and management, which may include the administration of parenteral medications, is required  Disposition  Final diagnoses:   Bradycardia   Junctional bradycardia   Sick sinus syndrome ([facility] [address] )     Time reflects when diagnosis was documented in both MDM as applicable and the Disposition within this note     Time User Action Codes Description Comment    3/10/2023 12:38 PM Chrystie Schilder Add [R00 1] Bradycardia     3/10/2023 12:42 PM Josesito Rivera Add [R00 1] Junctional bradycardia     3/10/2023 12:44 PM Chrystie Schilder Add [I49 5] Sick sinus syndrome [facility])       ED Disposition     ED Disposition   Admit    Condition   Stable    Date/Time   Fri Mar 10, 2023 12:42 PM    Comment              Follow-up Information    None         Patient's Medications   Discharge Prescriptions    No medications on file       No discharge procedures on file      PDMP Review       Value Time User    PDMP Reviewed  Yes 6/10/2022  4:57 PM Adam Israel          ED Provider  Electronically Signed by           Karen Stroud DO  03/10/23 1147

## 2023-03-10 NOTE — ASSESSMENT & PLAN NOTE
Patient was sent from urgent care today due to concern for bradycardia  Patient states that she went to urgent care due to sinus infection symptoms  For the last few months has been intermittently having chest pains palpitations fluttering in her chest but nothing recently  denies any syncopal episodes falls or loss of consciousness  Does feel a bit tired and fatigued at times with minimal exertion  initial EKG showed junctional rhythm with heart rate in the 30s to low 40s  Now heart rate has improved into the 40s and 50s  Is not on any AV kirk blocking agents  Will maintain on telemetry monitoring  Plan for pacemaker on Monday    Appreciate cardiology input  atropine at bedside in case patient's heart rates drop below 30

## 2023-03-10 NOTE — CONSULTS
Consultation - Cardiology   Kit Frank 70 y o  female MRN: [id number]  Unit/Bed#: ED 06 Encounter: [id number]    Assessment/Plan     Assessment:  SSS  Hx of DVT  HTN not currently on AV kirk blockers  HLD  Hypothyroidism    Plan:  Labs pending  Check thyroid  Hold all AV kirk blockers  Monitor on telemetry  Plan for transfer to [facility] on Sunday for EP consult and pacemaker placement on Monday with Dr Meri Wahl  Call with any further questions  History of Present Illness   Physician Requesting Consult: Lindsay De La Cruz DO  Reason for Consult / Principal Problem: SSS  HPI: Kit Frank is a 70y o  year old female with history of DVT, HTN, HLD, hypothyroidism presented from urgent care today for concerns with bradycardia  Patient went to urgent care today for sinusitis and was found to have a junctional rhythm with a HR in 40  Patient does report over the last week she has been feeling weak and sob  She has no history of this  She reports no other cardiac history  Currently at the bedside patient has no complaints  She states most of her fatigue and sob is worse with ambulation  Currently she is with her   She is agreeable to transfer to [facility] on Sunday for plan for EP to place a pacemaker on Monday  Discussion with Dr Meri Wahl with EP occurred  Consults    Review of Systems   Constitutional: Positive for fatigue  Negative for chills and unexpected weight change  Respiratory: Positive for shortness of breath  Negative for chest tightness  Cardiovascular: Negative for chest pain, palpitations and leg swelling  Gastrointestinal: Negative for nausea  Skin: Negative for color change, pallor and rash  Neurological: Positive for weakness  Negative for dizziness, syncope and light-headedness  Psychiatric/Behavioral: Negative for agitation, behavioral problems and confusion         Historical Information   Past Medical History:   Diagnosis Date   • Allergic rhinitis    • Carpal tunnel syndrome on both sides    • Cervical disc herniation    • DVT (deep venous thrombosis) (HCC)     DVT Left Leg   • Fracture of foot     left casted   • History of mammogram 2017    Being followed by Dr Ann Desouza   • History of Papanicolaou smear of cervix 2016    Dr Ann Desouza   • Hx of colonoscopy 2016    Dr Edwin Epley   • Hyperlipemia    • Hypertension    • Hypothyroidism    • Lichen sclerosus    • Renal calculi    • Rotator cuff tear, left      Past Surgical History:   Procedure Laterality Date   • CERVICAL LAMINECTOMY N/A    • CHOLECYSTECTOMY N/A    • KNEE CARTILAGE SURGERY Bilateral    • LITHOTRIPSY N/A    • NEUROPLASTY / TRANSPOSITION MEDIAN NERVE AT CARPAL TUNNEL BILATERAL Bilateral    • ROTATOR CUFF REPAIR Left    • TONSILLECTOMY AND ADENOIDECTOMY Bilateral    • TONSILLECTOMY AND ADENOIDECTOMY     • TUBAL LIGATION N/A    • US GUIDED THYROID BIOPSY  2018   • WRIST FRACTURE SURGERY Right     plates and pins      Social History     Substance and Sexual Activity   Alcohol Use No     Social History     Substance and Sexual Activity   Drug Use No     E-Cigarette/Vaping   • E-Cigarette Use Never User      E-Cigarette/Vaping Substances   • Nicotine No    • THC No    • CBD No    • Flavoring No    • Other No    • Unknown No      Social History     Tobacco Use   Smoking Status Some Days   • Packs/day: 0 30   • Years: 10 00   • Pack years: 3 00   • Types: Cigarettes   • Last attempt to quit: 2019   • Years since quittin 0   Smokeless Tobacco Never   Tobacco Comments    quite date 1 year     Family History: non-contributory    Meds/Allergies   all current active meds have been reviewed  No Known Allergies    Objective   Vitals: Blood pressure 113/56, pulse (!) 45, temperature (!) 96 8 °F (36 °C), temperature source Tympanic, resp  rate (!) 24, height 5' 8" (1 727 m), weight 129 kg (285 lb 4 4 oz), last menstrual period 2004, SpO2 98 %    Orthostatic Blood Pressures    Flowsheet Row Most Recent Value Blood Pressure 113/56 filed at 03/10/2023 1215   Patient Position - Orthostatic VS Sitting filed at 03/10/2023 1118            Intake/Output Summary (Last 24 hours) at 3/10/2023 1248  Last data filed at 3/10/2023 1114  Gross per 24 hour   Intake 100 ml   Output --   Net 100 ml       Invasive Devices     Peripheral Intravenous Line  Duration           Peripheral IV 03/10/23 Distal;Left;Ventral (anterior) Forearm <1 day                Physical Exam  Vitals and nursing note reviewed  Constitutional:       Appearance: Normal appearance  HENT:      Head: Normocephalic and atraumatic  Cardiovascular:      Rate and Rhythm: Bradycardia present  Pulmonary:      Effort: Pulmonary effort is normal    Abdominal:      Palpations: Abdomen is soft  Musculoskeletal:         General: No swelling  Skin:     General: Skin is warm  Neurological:      General: No focal deficit present  Mental Status: She is alert and oriented to person, place, and time  Psychiatric:         Mood and Affect: Mood normal          Thought Content: Thought content normal          Lab Results:   I have personally reviewed pertinent lab results  CBC with diff:   Results from last 7 days   Lab Units 03/10/23  1220   WBC Thousand/uL 8 70   RBC Million/uL 4 81   HEMOGLOBIN g/dL 14 9   HEMATOCRIT % 46 7*   MCV fL 97   MCH pg 31 0   MCHC g/dL 31 9   RDW % 14 4   MPV fL 11 8   PLATELETS Thousands/uL 184     CMP:       Invalid input(s): ALBUMIN  HS Troponin: No results found for: HSTNI, HSTNI0, HSTNI2, HSTNI4  BNP:     Coags:     TSH:     Magnesium:     Lipid Profile:     Imaging: I have personally reviewed pertinent reports      EKG: junctional  Bradycardia, SSS

## 2023-03-11 VITALS
HEART RATE: 45 BPM | DIASTOLIC BLOOD PRESSURE: 73 MMHG | BODY MASS INDEX: 42.13 KG/M2 | OXYGEN SATURATION: 96 % | TEMPERATURE: 97.9 F | SYSTOLIC BLOOD PRESSURE: 165 MMHG | RESPIRATION RATE: 18 BRPM | HEIGHT: 68 IN | WEIGHT: 278 LBS

## 2023-03-11 LAB
FLUAV RNA RESP QL NAA+PROBE: NEGATIVE
FLUBV RNA RESP QL NAA+PROBE: NEGATIVE
RSV RNA RESP QL NAA+PROBE: NEGATIVE
SARS-COV-2 RNA RESP QL NAA+PROBE: NEGATIVE
T3FREE SERPL-MCNC: 1.94 PG/ML (ref 2.3–4.2)
T4 FREE SERPL-MCNC: 1 NG/DL (ref 0.76–1.46)

## 2023-03-11 RX ORDER — ENOXAPARIN SODIUM 100 MG/ML
40 INJECTION SUBCUTANEOUS
Status: DISCONTINUED | OUTPATIENT
Start: 2023-03-11 | End: 2023-03-11 | Stop reason: HOSPADM

## 2023-03-11 RX ORDER — ACETAMINOPHEN 325 MG/1
650 TABLET ORAL EVERY 6 HOURS PRN
Refills: 0
Start: 2023-03-11

## 2023-03-11 RX ADMIN — FLUTICASONE PROPIONATE 1 SPRAY: 50 SPRAY, METERED NASAL at 08:16

## 2023-03-11 RX ADMIN — ENOXAPARIN SODIUM 40 MG: 40 INJECTION SUBCUTANEOUS at 09:26

## 2023-03-11 RX ADMIN — ASPIRIN 81 MG: 81 TABLET, COATED ORAL at 08:11

## 2023-03-11 RX ADMIN — OXYCODONE HYDROCHLORIDE AND ACETAMINOPHEN 500 MG: 500 TABLET ORAL at 08:11

## 2023-03-11 RX ADMIN — Medication 2000 UNITS: at 08:11

## 2023-03-11 RX ADMIN — ZINC SULFATE 220 MG (50 MG) CAPSULE 220 MG: CAPSULE at 08:11

## 2023-03-11 RX ADMIN — LORATADINE 10 MG: 10 TABLET ORAL at 08:11

## 2023-03-11 RX ADMIN — LEVOTHYROXINE SODIUM 150 MCG: 150 TABLET ORAL at 05:15

## 2023-03-11 RX ADMIN — CYANOCOBALAMIN TAB 500 MCG 1000 MCG: 500 TAB at 08:11

## 2023-03-11 NOTE — DISCHARGE SUMMARY
[address]  Discharge- Vinicio Pierson 1955, 70 y o  female MRN: [id number]  Unit/Bed#: -01 Encounter: [id number]  Primary Care Provider: KELLIE Vergara   Date and time admitted to hospital: 3/10/2023 11:13 AM    * Sick sinus syndrome [facility])  Assessment & Plan  Patient was sent from urgent care today due to concern for bradycardia  Patient states that she went to urgent care due to sinus infection symptoms  For the last few months has been intermittently having chest pains palpitations fluttering in her chest but nothing recently  denies any syncopal episodes falls or loss of consciousness  Does feel a bit tired and fatigued at times with minimal exertion  initial EKG showed junctional rhythm with heart rate in the 30s to low 40s  Now heart rate has improved into the 40s and 50s  Is not on any AV kirk blocking agents  Will maintain on telemetry monitoring  Plan for pacemaker on Monday  Appreciate cardiology input  atropine at bedside in case patient's heart rates drop below 30  covid 19 influenza RSV negative      Morbid obesity with BMI of 40 0-44 9, adult ([facility] [address] )  Assessment & Plan  bmi 42  Counseled on diet exercise and lifestyle modification    Pure hypercholesterolemia  Assessment & Plan  Cont statins    Essential hypertension  Assessment & Plan  bp is controlled  hold dyazide    Acquired hypothyroidism  Assessment & Plan  TSH is mildly elevated   Free t4 normal  Cont levothyroxine      Discharging Physician / Practitioner: Margaret Norton MD  PCP: Letty Vergara  Admission Date:   Admission Orders (From admission, onward)     Ordered        03/10/23 1304  INPATIENT ADMISSION  Once                      Discharge Date: 03/11/23    Medical Problems     Resolved Problems  Date Reviewed: 3/11/2023   None         Consultations During Hospital Stay:  · cardiology    Procedures Performed:     · none    Significant Findings / Test Results:     XR chest pa & lateral    Result Date: 3/10/2023  Impression: No acute cardiopulmonary disease  Workstation performed: [id number]     Incidental Findings:   · none    Test Results Pending at Discharge (will require follow up):   · none     Outpatient Tests Requested:  · none    Complications:  none    Reason for Admission: Symptomatic bradycardia    Hospital Course: India Chavez is a 70 y o  female patient who originally presented to the hospital on 3/10/2023 due to to symptomatic bradycardia  Patient states over the last few months she intermittently was having chest heaviness tightness and feeling fluttering in her chest   She had some sinus symptoms and went to urgent care there she had an EKG done which showed junctional rhythm with heart rate in the low 30s and sent to the ED to be evaluated  Seen by cardiology plan to transfer her to [facility] for pacemaker placement  Is currently asymptomatic at rest and only having some sinus symptoms but continues to have junctional bradycardia episode with heart rate between 35-38  Did not receive any atropine so far  Please see above list of diagnoses and related plan for additional information  Condition at Discharge: stable     Discharge Day Visit / Exam:     * Please refer to separate progress note for these details *    Discussion with Family: dw spouse    Discharge instructions/Information to patient and family:   See after visit summary for information provided to patient and family  Provisions for Follow-Up Care:  See after visit summary for information related to follow-up care and any pertinent home health orders  Disposition:     [address] Transfer to [facility]    For Discharges to   [address] [facility]:   · Not Applicable to this Patient - Not Applicable to this Patient    Planned Readmission: none     Discharge Statement:  I spent 35 minutes discharging the patient  This time was spent on the day of discharge   I had direct contact with the patient on the day of discharge  Greater than 50% of the total time was spent examining patient, answering all patient questions, arranging and discussing plan of care with patient as well as directly providing post-discharge instructions  Additional time then spent on discharge activities  Discharge Medications:  See after visit summary for reconciled discharge medications provided to patient and family        ** Please Note: This note has been constructed using a voice recognition system **

## 2023-03-11 NOTE — PLAN OF CARE
Problem: Potential for Falls  Goal: Patient will remain free of falls  Description: INTERVENTIONS:  - Educate patient/family on patient safety including physical limitations  - Instruct patient to call for assistance with activity   - Consult OT/PT to assist with strengthening/mobility   - Keep Call bell within reach  - Keep bed low and locked with side rails adjusted as appropriate  - Keep care items and personal belongings within reach  - Initiate and maintain comfort rounds  - Make Fall Risk Sign visible to staff  - Offer Toileting every 2 Hours, in advance of need  - Obtain necessary fall risk management equipment:   - Apply yellow socks and bracelet for high fall risk patients  - Consider moving patient to room near nurses station  Outcome: Progressing     Problem: PAIN - ADULT  Goal: Verbalizes/displays adequate comfort level or baseline comfort level  Description: Interventions:  - Encourage patient to monitor pain and request assistance  - Assess pain using appropriate pain scale  - Administer analgesics based on type and severity of pain and evaluate response  - Implement non-pharmacological measures as appropriate and evaluate response  - Consider cultural and social influences on pain and pain management  - Notify physician/advanced practitioner if interventions unsuccessful or patient reports new pain  Outcome: Progressing     Problem: INFECTION - ADULT  Goal: Absence or prevention of progression during hospitalization  Description: INTERVENTIONS:  - Assess and monitor for signs and symptoms of infection  - Monitor lab/diagnostic results  - Monitor all insertion sites, i e  indwelling lines, tubes, and drains  - Monitor endotracheal if appropriate and nasal secretions for changes in amount and color  - [place] appropriate cooling/warming therapies per order  - Administer medications as ordered  - Instruct and encourage patient and family to use good hand hygiene technique  - Identify and instruct in appropriate isolation precautions for identified infection/condition  Outcome: Progressing     Problem: SAFETY ADULT  Goal: Patient will remain free of falls  Description: INTERVENTIONS:  - Educate patient/family on patient safety including physical limitations  - Instruct patient to call for assistance with activity   - Consult OT/PT to assist with strengthening/mobility   - Keep Call bell within reach  - Keep bed low and locked with side rails adjusted as appropriate  - Keep care items and personal belongings within reach  - Initiate and maintain comfort rounds  - Make Fall Risk Sign visible to staff  - Offer Toileting every 2 Hours, in advance of need  - Obtain necessary fall risk management equipment:   - Apply yellow socks and bracelet for high fall risk patients  - Consider moving patient to room near nurses station  Outcome: Progressing  Goal: Maintain or return to baseline ADL function  Description: INTERVENTIONS:  -  Assess patient's ability to carry out ADLs; assess patient's baseline for ADL function and identify physical deficits which impact ability to perform ADLs (bathing, care of mouth/teeth, toileting, grooming, dressing, etc )  - Assess/evaluate cause of self-care deficits   - Assess range of motion  - Assess patient's mobility; develop plan if impaired  - Assess patient's need for assistive devices and provide as appropriate  - Encourage maximum independence but intervene and supervise when necessary  - Involve family in performance of ADLs  - Assess for home care needs following discharge   - Consider OT consult to assist with ADL evaluation and planning for discharge  - Provide patient education as appropriate  Outcome: Progressing  Goal: Maintains/Returns to pre admission functional level  Description: INTERVENTIONS:  - Perform BMAT or MOVE assessment daily    - Set and communicate daily mobility goal to care team and patient/family/caregiver     - Collaborate with rehabilitation services on mobility goals if consulted  - Perform Range of Motion 3 times a day  - Reposition patient every 2 hours  - Dangle patient 2 times a day  - Stand patient 3 times a day  - Ambulate patient 3 times a day  - Out of bed to chair 3 times a day   - Out of bed for meals 3 times a day  - Out of bed for toileting  - Record patient progress and toleration of activity level   Outcome: Progressing     Problem: DISCHARGE PLANNING  Goal: Discharge to home or other facility with appropriate resources  Description: INTERVENTIONS:  - Identify barriers to discharge w/patient and caregiver  - Arrange for needed discharge resources and transportation as appropriate  - Identify discharge learning needs (meds, wound care, etc )  - Arrange for interpretive services to assist at discharge as needed  - Refer to Case Management Department for coordinating discharge planning if the patient needs post-hospital services based on physician/advanced practitioner order or complex needs related to functional status, cognitive ability, or social support system  Outcome: Progressing     Problem: Knowledge Deficit  Goal: Patient/family/caregiver demonstrates understanding of disease process, treatment plan, medications, and discharge instructions  Description: Complete learning assessment and assess knowledge base    Interventions:  - Provide teaching at level of understanding  - Provide teaching via preferred learning methods  Outcome: Progressing

## 2023-03-11 NOTE — PROGRESS NOTES
[address]  Progress Note - Joe Titus 1955, 70 y o  female MRN: [id number]  Unit/Bed#: -01 Encounter: [id number]  Primary Care Provider: KELLIE Bey   Date and time admitted to hospital: 3/10/2023 11:13 AM    * Sick sinus syndrome [facility])  Assessment & Plan  Patient was sent from urgent care today due to concern for bradycardia  Patient states that she went to urgent care due to sinus infection symptoms  For the last few months has been intermittently having chest pains palpitations fluttering in her chest but nothing recently  denies any syncopal episodes falls or loss of consciousness  Does feel a bit tired and fatigued at times with minimal exertion  initial EKG showed junctional rhythm with heart rate in the 30s to low 40s  Now heart rate has improved into the 40s and 50s  Is not on any AV kirk blocking agents  Will maintain on telemetry monitoring  Plan for pacemaker on Monday  Appreciate cardiology input  atropine at bedside in case patient's heart rates drop below 30      Morbid obesity with BMI of 40 0-44 9, adult ([facility] [address] )  Assessment & Plan  bmi 42  Counseled on diet exercise and lifestyle modification    Pure hypercholesterolemia  Assessment & Plan  Cont statins    Essential hypertension  Assessment & Plan  bp is controlled  hold dyazide    Acquired hypothyroidism  Assessment & Plan  TSH is mildly elevated  Free t4 normal  Cont levothyroxine      VTE Pharmacologic Prophylaxis:   Pharmacologic: Enoxaparin (Lovenox)  Mechanical VTE Prophylaxis in Place: Yes    Patient Centered Rounds: I have performed bedside rounds with nursing staff today  Discussions with Specialists or Other Care Team Provider: none    Education and Discussions with Family / Patient: dw patient    Time Spent for Care: 30 minutes  More than 50% of total time spent on counseling and coordination of care as described above      Current Length of Stay: 1 day(s)    Current Patient Status: Inpatient   Certification Statement: The patient will continue to require additional inpatient hospital stay due to sick sinus syndrome    Discharge Plan: discharge to [facility] tomorrow for pacemaker placement with Dr Aram Montague on Monday    Code Status: Level 1 - Full Code      Subjective:   Patient denies any complaints except for runny nose  Does not feel any thing wrong with her heart while resting in bed  noted To have episodes of junctional rhythm with heart rates between 35-38  Objective:     Vitals:   Temp (24hrs), Av °F (36 7 °C), Min:96 8 °F (36 °C), Max:98 9 °F (37 2 °C)    Temp:  [96 8 °F (36 °C)-98 9 °F (37 2 °C)] 98 3 °F (36 8 °C)  HR:  [37-60] 47  Resp:  [18-24] 20  BP: (105-161)/(55-71) 114/58  SpO2:  [95 %-98 %] 96 %  Body mass index is 42 27 kg/m²  Input and Output Summary (last 24 hours): Intake/Output Summary (Last 24 hours) at 3/11/2023 0911  Last data filed at 3/11/2023 7014  Gross per 24 hour   Intake 660 ml   Output --   Net 660 ml       Physical Exam:     Physical Exam  Vitals and nursing note reviewed  Constitutional:       Appearance: Normal appearance  HENT:      Head: Normocephalic and atraumatic  Right Ear: External ear normal       Left Ear: External ear normal       Nose: Nose normal       Mouth/Throat:      Pharynx: Oropharynx is clear  Eyes:      Pupils: Pupils are equal, round, and reactive to light  Cardiovascular:      Rate and Rhythm: Regular rhythm  Bradycardia present  Heart sounds: Normal heart sounds  Pulmonary:      Effort: Pulmonary effort is normal       Breath sounds: Normal breath sounds  Abdominal:      General: Bowel sounds are normal       Palpations: Abdomen is soft  Tenderness: There is no abdominal tenderness  Musculoskeletal:         General: Normal range of motion  Cervical back: Normal range of motion and neck supple  Skin:     General: Skin is warm and dry        Capillary Refill: Capillary refill takes less than 2 seconds  Neurological:      General: No focal deficit present  Mental Status: She is alert and oriented to person, place, and time  Psychiatric:         Mood and Affect: Mood normal            Additional Data:     Labs:    Results from last 7 days   Lab Units 03/10/23  1220   WBC Thousand/uL 8 70   HEMOGLOBIN g/dL 14 9   HEMATOCRIT % 46 7*   PLATELETS Thousands/uL 184   NEUTROS PCT % 54   LYMPHS PCT % 33   MONOS PCT % 8   EOS PCT % 4     Results from last 7 days   Lab Units 03/10/23  1315   SODIUM mmol/L 140   POTASSIUM mmol/L 4 0   CHLORIDE mmol/L 105   CO2 mmol/L 28   BUN mg/dL 15   CREATININE mg/dL 0 97   ANION GAP mmol/L 7   CALCIUM mg/dL 9 7   ALBUMIN g/dL 4 4   TOTAL BILIRUBIN mg/dL 0 64   ALK PHOS U/L 68   ALT U/L 17   AST U/L 18   GLUCOSE RANDOM mg/dL 85                           * I Have Reviewed All Lab Data Listed Above  * Additional Pertinent Lab Tests Reviewed:  Ghada Dhillon Admission Reviewed    Imaging:    Imaging Reports Reviewed Today Include: none  Imaging Personally Reviewed by Myself Includes:  none    Recent Cultures (last 7 days):           Last 24 Hours Medication List:   Current Facility-Administered Medications   Medication Dose Route Frequency Provider Last Rate   • acetaminophen  650 mg Oral Q6H PRN Janet Sales MD     • ascorbic acid  500 mg Oral Daily Janet Sales MD     • aspirin  81 mg Oral Daily Janet Sales MD     • atorvastatin  40 mg Oral Daily Janet Sales MD     • Atropine Sulfate  0 4 mg Intravenous Daily PRN Janet Sales MD     • cholecalciferol  2,000 Units Oral Daily Janet Sales MD     • vitamin B-12  1,000 mcg Oral Every Other Day Janet Sales MD     • fluticasone  1 spray Each Nare BID Janet Sales MD     • levothyroxine  150 mcg Oral Early Morning Janet Sales MD     • loratadine  10 mg Oral Daily Janet Sales MD     • magnesium Oxide  400 mg Oral HS Janet Sales MD     • nicotine  1 patch Transdermal Daily Janet Sales MD     • zinc sulfate  220 mg Oral Daily Adry Manriquez MD          Today, Patient Was Seen By: Adry Manriuqez MD    ** Please Note: Dictation voice to text software may have been used in the creation of this document   **

## 2023-03-11 NOTE — ASSESSMENT & PLAN NOTE
Patient was sent from urgent care today due to concern for bradycardia  Patient states that she went to urgent care due to sinus infection symptoms  For the last few months has been intermittently having chest pains palpitations fluttering in her chest but nothing recently  denies any syncopal episodes falls or loss of consciousness  Does feel a bit tired and fatigued at times with minimal exertion  initial EKG showed junctional rhythm with heart rate in the 30s to low 40s  Now heart rate has improved into the 40s and 50s  Is not on any AV kirk blocking agents  Will maintain on telemetry monitoring  Plan for pacemaker on Monday    Appreciate cardiology input  atropine at bedside in case patient's heart rates drop below 30  covid 19 influenza RSV negative

## 2023-03-11 NOTE — UTILIZATION REVIEW
Initial Clinical Review    Admission: Date/Time/Statement:   Admission Orders (From admission, onward)     Ordered        03/10/23 1304  INPATIENT ADMISSION  Once                      Orders Placed This Encounter   Procedures   • INPATIENT ADMISSION     Standing Status:   Standing     Number of Occurrences:   1     Order Specific Question:   Level of Care     Answer:   Level 2 Stepdown / HOT [14]     Order Specific Question:   Estimated length of stay     Answer:   More than 2 Midnights     Order Specific Question:   Certification     Answer:   I certify that inpatient services are medically necessary for this patient for a duration of greater than two midnights  See H&P and MD Progress Notes for additional information about the patient's course of treatment  ED Arrival Information     Expected   3/10/2023 11:03    Arrival   3/10/2023 11:13    Acuity   Urgent            Means of arrival   Ambulance    Escorted by   [employer]     Service   Hospitalist    Admission type   Emergency            Arrival complaint   -           Chief Complaint   Patient presents with   • Slow Heart Rate     Pt  Seen at urgent care today for a productive cough for the past few days, found to have a heart rate in the 40's, asymptomatic, denies chest pain or sob, denies n/v/d, denies fatigue or dizziness, pt  Has no history of bradycardia        Initial Presentation: 70 y o  female with PMH of HTN, hypothyroidism and hypercholesteremia who presented to an urgent care for evaluation of sinus symptoms  She was found to have bradycardia, with periods of heart rate in the 40s  Asymptomatic with no prior history of known bradycardia  No AV kirk blocking agents, no recent illicit substance use  Patient was sent to the ED  In the ED, patient was found to be in junctional bradycardia with heart rates int he 30's  Patient reported intermittent episodes of chest heaviness and fluttering in her chest a few months ago   Her thyroid medicine was increased 6 months ago  3/10 Inpatient admission for evaluation and treatment of sick sinus syndrome:  Telemetry, consult Cardiology  3/10 Cardiology consult: SSS  Patient with history of HTN not on AV kirk blockers presented to the hospital from urgent care for concerns with bradycardia  She went to urgent care for concerns with sinusitis but was found to be incidentally bradycardic  EKG showed junctional bradycardia concerning for SSS  After further investigation patient reports that she has been feeling weak and SOB for the last week but had thought it was related to an illness  Currently at the bedside she has no acute complaints  Labs show no remarkable abnormalities  On exam patient is bradycardic but in no acute distress  Discussion with EP at [facility] occurred and they are agreeable to have patient transferred on Sunday for pacemaker to be placed on Monday  Family is also in agreement  Hold all AV kirk blockers  Monitor on telemetry through the weekend  3/11 Internal Medicine Discharge Summary:     70 y o  female patient who originally presented to the hospital on 3/10/2023 due to to symptomatic bradycardia  Patient states over the last few months she intermittently was having chest heaviness tightness and feeling fluttering in her chest   She had some sinus symptoms and went to urgent care there she had an EKG done which showed junctional rhythm with heart rate in the low 30s and sent to the ED to be evaluated  Seen by cardiology, plan to transfer her to [facility] for pacemaker placement  Is currently asymptomatic at rest and only having some sinus symptoms but continues to have junctional bradycardia episode with heart rate between 35-38  Did not receive any atropine so far       3/11 1135 Transferred to [facility]           ED Triage Vitals [03/10/23 1118]   Temperature Pulse Respirations Blood Pressure SpO2   (!) 96 8 °F (36 °C) (!) 43 20 146/65 98 %      Temp Source Heart Rate Source Patient Position - Orthostatic VS BP Location FiO2 (%)   Tympanic Monitor Sitting Right arm --      Pain Score       No Pain          Wt Readings from Last 1 Encounters:   03/10/23 126 kg (278 lb)     Additional Vital Signs:     Date/Time Temp Pulse Resp BP MAP (mmHg) SpO2 O2 Device   03/11/23 1120 97 9 °F (36 6 °C) 45 Abnormal  18 165/73 105 96 % None (Room air)   03/11/23 0718 98 3 °F (36 8 °C) 47 Abnormal  20 114/58 84 96 % None (Room air)   03/11/23 0521 -- 39 Abnormal  18 120/55 79 95 % --   03/11/23 0400 98 °F (36 7 °C) -- -- -- -- -- --   03/10/23 2311 98 3 °F (36 8 °C) 43 Abnormal  20 132/58 83 96 % None (Room air)   03/10/23 1831 97 8 °F (36 6 °C) 60 22 161/71 102 96 % None (Room air)   03/10/23 1532 97 6 °F (36 4 °C) 40 Abnormal  20 136/65 94 98 % None (Room air)   03/10/23 1347 98 5 °F (36 9 °C) 39 Abnormal  20 134/65 92 98 % None (Room air)   03/10/23 1300 -- 38 Abnormal  21 130/60 86 97 % --   03/10/23 1238 -- 45 Abnormal   -- -- -- -- --   03/10/23 1237 -- 37 Abnormal   -- -- -- -- --   03/10/23 1230 -- 55 18 106/59 76 97 % --   03/10/23 1215 -- 39 Abnormal  24  113/56 79 98 % --   03/10/23 1200 -- 41 Abnormal  18 105/55 79 98 % None (Room air)         Pertinent Labs/Diagnostic Test Results:       3/10 EKG:    Junctional bradycardia with retrograde conduction  Nonspecific ST abnormality  Abnormal ECG  When compared with ECG of 10-MAR-2023 09:49, (unconfirmed)  T wave inversion no longer evident in Inferior leads    Component Ref Range & Units 3/10/23 1117   Ventricular Rate BPM 42    Atrial Rate BPM    DC Interval ms    QRSD Interval ms 88    QT Interval ms 462    QTC Interval ms 385    P Axis degrees    QRS Axis degrees 36    T Wave Axis degrees 42             3/10 Urgent Care  CXR: No acute cardiopulmonary disease            Results from last 7 days   Lab Units 03/11/23  0927   SARS-COV-2  Negative     Results from last 7 days   Lab Units 03/10/23  1220   WBC Thousand/uL 8 70   HEMOGLOBIN g/dL 14 9   HEMATOCRIT % 46 7*   PLATELETS Thousands/uL 184   NEUTROS ABS Thousands/µL 4 66         Results from last 7 days   Lab Units 03/10/23  1315   SODIUM mmol/L 140   POTASSIUM mmol/L 4 0   CHLORIDE mmol/L 105   CO2 mmol/L 28   ANION GAP mmol/L 7   BUN mg/dL 15   CREATININE mg/dL 0 97   EGFR ml/min/1 73sq m 59   CALCIUM mg/dL 9 7   MAGNESIUM mg/dL 2 3     Results from last 7 days   Lab Units 03/10/23  1315   AST U/L 18   ALT U/L 17   ALK PHOS U/L 68   TOTAL PROTEIN g/dL 7 8   ALBUMIN g/dL 4 4   TOTAL BILIRUBIN mg/dL 0 64         Results from last 7 days   Lab Units 03/10/23  1315   GLUCOSE RANDOM mg/dL 85                 Results from last 7 days   Lab Units 03/10/23  1649 03/10/23  1315 03/10/23  1220   HS TNI 0HR ng/L  --   --  7   HS TNI 2HR ng/L  --  9  --    HSTNI D2 ng/L  --  2  --    HS TNI 4HR ng/L 8  --   --    HSTNI D4 ng/L 1  --   --              Results from last 7 days   Lab Units 03/10/23  1315   TSH 3RD GENERATON uIU/mL 5 179*                 Results from last 7 days   Lab Units 03/10/23  0956   CLARITY UA  clear   COLOR UA  yellow   GLUCOSE UA  neg   KETONES UA  neg   BLOOD UA  small   PROTEIN UA  neg   NITRITE UA  neg   BILIRUBIN UA POC  neg   UROBILINOGEN UA  0 2   LEUKOCYTES UA  neg     Results from last 7 days   Lab Units 03/11/23  0927   INFLUENZA A PCR  Negative   INFLUENZA B PCR  Negative   RSV PCR  Negative           ED Treatment:   Medication Administration from 03/10/2023 1113 to 03/10/2023 1345     None        Past Medical History:   Diagnosis Date   • Allergic rhinitis    • Carpal tunnel syndrome on both sides    • Cervical disc herniation    • DVT (deep venous thrombosis) ([address] )     DVT Left Leg   • Fracture of foot     left casted   • History of mammogram 2017    Being followed by Dr Prerna Oakes   • History of Papanicolaou smear of cervix 2016    Dr Prerna Oakes   • Hx of colonoscopy 2016    Dr Geovanna Wood   • Hyperlipemia    • Hypertension    • Hypothyroidism    • Lichen sclerosus    • Renal calculi • Rotator cuff tear, left      Present on Admission:  • Acquired hypothyroidism  • Essential hypertension  • Pure hypercholesterolemia      Admitting Diagnosis: Sick sinus syndrome (HCC) [I49 5]  Bradycardia [R00 1]  Slow heart rate [R00 1]  Junctional bradycardia [R00 1]  Age/Sex: 70 y o  female       Admission Orders: Cardio-Pulmonary monitoring, telemetry  Scheduled Medications:    ascorbic acid, 500 mg, Oral, Daily  aspirin, 81 mg, Oral, Daily  atorvastatin, 40 mg, Oral, Daily  cholecalciferol, 2,000 Units, Oral, Daily  vitamin B-12, 1,000 mcg, Oral, Every Other Day  enoxaparin, 40 mg, Subcutaneous, Q24H HARISH  fluticasone, 1 spray, Each Nare, BID  levothyroxine, 150 mcg, Oral, Early Morning  loratadine, 10 mg, Oral, Daily  magnesium Oxide, 400 mg, Oral, HS  nicotine, 1 patch, Transdermal, Daily  zinc sulfate, 220 mg, Oral, Daily      Continuous IV Infusions: None  PRN Meds:  acetaminophen, 650 mg, Oral, Q6H PRN  Atropine Sulfate, 0 4 mg, Intravenous, Daily PRN        IP CONSULT TO CARDIOLOGY    Network Utilization Review Department  ATTENTION: Please call with any questions or concerns to [phone] and carefully listen to the prompts so that you are directed to the right person  All voicemails are confidential   Laith Spear all requests for admission clinical reviews, approved or denied determinations and any other requests to dedicated fax number below belonging to the campus where the patient is receiving treatment   List of dedicated fax numbers for the Facilities:  [address] DENIALS (Administrative/Medical Necessity) [phone]   [address] (Maternity/NICU/Pediatrics) [address] [place] [address] [phone]   [facility] [place] [address] Medical [place] [phone]   [facility] [address] [address] [address] [facility] [place] 134 815 [facility] [phone]

## 2023-03-11 NOTE — EMTALA/ACUTE CARE TRANSFER
[address] [facility] UNIT  [address] [place] [address]  Dept: [phone]      ACUTE CARE TRANSFER CONSENT    NAME Devika Grant                                         1955                              MRN [id number]    I have been informed of my rights regarding examination, treatment, and transfer   by Dr Dustin Esteban att  providers found    Benefits:  sick sinus   needs pacemaker    Risks:  worsening bradycardia      Transfer Request:  I acknowledge that my medical condition has been evaluated and explained to me by the treating physician or other qualified medical person and/or my attending physician who has recommended and offered to me further medical examination and treatment  I understand the Hospital's obligation with respect to the treatment and stabilization of my medical condition  I nevertheless request to be transferred  I release the Hospital, the doctor, and any other persons caring for me from all responsibility or liability for any injury or ill effects that may result from my transfer and agree to accept all responsibility for the consequences of my choice to transfer, rather than receive stabilizing treatment at the Hospital  I understand that because the transfer is my request, my insurance may not provide reimbursement for the services  The Hospital will assist and direct me and my family in how to make arrangements for transfer, but the hospital is not liable for any fees charged by the transport service  In spite of this understanding, I refuse to consent to further medical examination and treatment which has been offered to me, and request transfer to    I authorize the performance of emergency medical procedures and treatments upon me in both transit and upon arrival at the receiving facility  Additionally, I authorize the release of any and all medical records to the receiving facility and request they be transported with me, if possible      I authorize the performance of emergency medical procedures and treatments upon me in both transit and upon arrival at the receiving facility  Additionally, I authorize the release of any and all medical records to the receiving facility and request they be transported with me, if possible  I understand that the safest mode of transportation during a medical emergency is an ambulance and that the Hospital advocates the use of this mode of transport  Risks of traveling to the receiving facility by car, including absence of medical control, life sustaining equipment, such as oxygen, and medical personnel has been explained to me and I fully understand them  (SJ CORRECT BOX BELOW)  [  ]  I consent to the stated transfer and to be transported by ambulance/helicopter  [  ]  I consent to the stated transfer, but refuse transportation by ambulance and accept full responsibility for my transportation by car  I understand the risks of non-ambulance transfers and I exonerate the Hospital and its staff from any deterioration in my condition that results from this refusal     X___________________________________________    DATE  23  TIME________  Signature of patient or legally responsible individual signing on patient behalf           RELATIONSHIP TO PATIENT_________________________          Provider Certification    NAME Berenice Knapp                                        [facility] 1955                              MRN [id number]    A medical screening exam was performed on the above named patient    Based on the examination:    Condition Necessitating Transfer bradycardia  Patient Condition:  stable    Reason for Transfer:  needs pacemaker    Transfer Requirements: Facility     · Space available and qualified personnel available for treatment as acknowledged by    · Agreed to accept transfer and to provide appropriate medical treatment as acknowledged by          · Appropriate medical records of the examination and treatment of the patient are provided at the time of transfer   [address] [facility], [address] _______  · Transfer will be performed by qualified personnel from    and appropriate transfer equipment as required, including the use of necessary and appropriate life support measures  Provider Certification: I have examined the patient and explained the following risks and benefits of being transferred/refusing transfer to the patient/family:         Based on these reasonable risks and benefits to the patient and/or the unborn child(paul), and based upon the information available at the time of the patient’s examination, I certify that the medical benefits reasonably to be expected from the provision of appropriate medical treatments at another medical facility outweigh the increasing risks, if any, to the individual’s medical condition, and in the case of labor to the unborn child, from effecting the transfer      X____________________________________________ DATE 03/11/23        TIME_______      ORIGINAL - SEND TO MEDICAL RECORDS   COPY - SEND WITH PATIENT DURING TRANSFER

## 2023-03-13 NOTE — UTILIZATION REVIEW
NOTIFICATION OF ADMISSION DISCHARGE   This is a Notification of Discharge from [address] [place] [address]  Please be advised that this patient has been discharge from our facility  Below you will find the admission and discharge date and time including the patient’s disposition  UTILIZATION REVIEW CONTACT:  [address] [place]  Utilization   Network Utilization Review Department  Phone: [phone] x carefully listen to the prompts  All voicemails are confidential   Email: [email or web address]     ADMISSION INFORMATION  PRESENTATION DATE: 3/10/2023 11:13 AM  OBERVATION ADMISSION DATE:   INPATIENT ADMISSION DATE: 3/10/23  1:04 PM   DISCHARGE DATE: 3/11/2023 11:35 AM   DISPOSITION:Non [facility] Acute Care/[facility] Term [facility]    IMPORTANT INFORMATION:  Send all requests for admission clinical reviews, approved or denied determinations and any other requests to dedicated fax number below belonging to the campus where the patient is receiving treatment   List of dedicated fax numbers:  [address] DENIALS (Administrative/Medical Necessity) [phone]   [address] (Maternity/NICU/Pediatrics) [phone]   [facility] [phone]   [place] [address] [phone]   [address] [phone]   220 [facility] [phone]   [address] [facility] [address] [phone]   [address] [place] [address] 119 [phone]   [facility]  [phone]   [address] [facility] [phone]   412 [facility] [address] [facility] [phone]

## 2023-04-21 ENCOUNTER — TELEPHONE (OUTPATIENT)
Dept: FAMILY MEDICINE CLINIC | Facility: CLINIC | Age: 68
End: 2023-04-21

## 2023-04-21 DIAGNOSIS — E53.8 VITAMIN B12 DEFICIENCY: ICD-10-CM

## 2023-04-21 DIAGNOSIS — E55.9 VITAMIN D DEFICIENCY: ICD-10-CM

## 2023-04-21 DIAGNOSIS — I10 PRIMARY HYPERTENSION: Primary | ICD-10-CM

## 2023-04-21 DIAGNOSIS — D64.9 ANEMIA, UNSPECIFIED TYPE: ICD-10-CM

## 2023-04-21 DIAGNOSIS — E03.9 ACQUIRED HYPOTHYROIDISM: ICD-10-CM

## 2023-04-21 DIAGNOSIS — E78.2 MIXED HYPERLIPIDEMIA: ICD-10-CM

## 2023-04-21 DIAGNOSIS — R73.9 HYPERGLYCEMIA: ICD-10-CM

## 2023-04-21 NOTE — TELEPHONE ENCOUNTER
I have an appointment with [facility] on Monday  I wanted to know if I need to get more blood work done as I was just in the hospital in the [facility] and I don't know if you had gotten any results back from that  My birth date is [birth year]  Please get back to me today so I know what to do for Monday  Thank you  Have a good day

## 2023-04-24 ENCOUNTER — OFFICE VISIT (OUTPATIENT)
Dept: FAMILY MEDICINE CLINIC | Facility: CLINIC | Age: 68
End: 2023-04-24

## 2023-04-24 VITALS
SYSTOLIC BLOOD PRESSURE: 144 MMHG | WEIGHT: 281 LBS | TEMPERATURE: 98 F | DIASTOLIC BLOOD PRESSURE: 76 MMHG | RESPIRATION RATE: 18 BRPM | OXYGEN SATURATION: 97 % | HEART RATE: 95 BPM | BODY MASS INDEX: 42.73 KG/M2

## 2023-04-24 DIAGNOSIS — Z86.718 HISTORY OF RECURRENT DEEP VEIN THROMBOSIS (DVT): ICD-10-CM

## 2023-04-24 DIAGNOSIS — Z12.4 SCREENING FOR CERVICAL CANCER: ICD-10-CM

## 2023-04-24 DIAGNOSIS — I49.5 SICK SINUS SYNDROME (HCC): ICD-10-CM

## 2023-04-24 DIAGNOSIS — J30.89 SEASONAL ALLERGIC RHINITIS DUE TO OTHER ALLERGIC TRIGGER: ICD-10-CM

## 2023-04-24 DIAGNOSIS — Z00.00 ENCOUNTER FOR ANNUAL WELLNESS EXAM IN MEDICARE PATIENT: Primary | ICD-10-CM

## 2023-04-24 DIAGNOSIS — E03.9 ACQUIRED HYPOTHYROIDISM: ICD-10-CM

## 2023-04-24 DIAGNOSIS — E78.00 PURE HYPERCHOLESTEROLEMIA: ICD-10-CM

## 2023-04-24 DIAGNOSIS — E53.8 VITAMIN B12 DEFICIENCY: ICD-10-CM

## 2023-04-24 DIAGNOSIS — R93.89 ABNORMAL CT OF THE CHEST: ICD-10-CM

## 2023-04-24 DIAGNOSIS — I82.532 CHRONIC DEEP VEIN THROMBOSIS (DVT) OF POPLITEAL VEIN OF LEFT LOWER EXTREMITY (HCC): ICD-10-CM

## 2023-04-24 DIAGNOSIS — E78.49 OTHER HYPERLIPIDEMIA: ICD-10-CM

## 2023-04-24 DIAGNOSIS — M79.89 LEFT LEG SWELLING: ICD-10-CM

## 2023-04-24 DIAGNOSIS — Z72.0 TOBACCO USE: ICD-10-CM

## 2023-04-24 DIAGNOSIS — M79.662 PAIN OF LEFT CALF: ICD-10-CM

## 2023-04-24 DIAGNOSIS — Z12.31 ENCOUNTER FOR SCREENING MAMMOGRAM FOR BREAST CANCER: ICD-10-CM

## 2023-04-24 DIAGNOSIS — E55.9 VITAMIN D DEFICIENCY: ICD-10-CM

## 2023-04-24 DIAGNOSIS — I10 ESSENTIAL HYPERTENSION: ICD-10-CM

## 2023-04-24 RX ORDER — LORATADINE 10 MG/1
TABLET ORAL
Qty: 30 TABLET | Refills: 5 | Status: SHIPPED | OUTPATIENT
Start: 2023-04-24

## 2023-04-24 RX ORDER — LANOLIN ALCOHOL/MO/W.PET/CERES
1000 CREAM (GRAM) TOPICAL 3 TIMES WEEKLY
Qty: 90 TABLET | Refills: 1 | Status: SHIPPED | OUTPATIENT
Start: 2023-04-24

## 2023-04-24 RX ORDER — LEVOTHYROXINE SODIUM 0.15 MG/1
150 TABLET ORAL
Qty: 90 TABLET | Refills: 1 | Status: SHIPPED | OUTPATIENT
Start: 2023-04-24

## 2023-04-24 NOTE — PROGRESS NOTES
Assessment and Plan:     Problem List Items Addressed This Visit        Endocrine    Acquired hypothyroidism    Relevant Medications    levothyroxine 150 mcg tablet       Cardiovascular and Mediastinum    Essential hypertension    Chronic deep vein thrombosis (DVT) (HCC)    Sick sinus syndrome (HCC)    Relevant Orders    CT chest w contrast       Other    Pure hypercholesterolemia    Vitamin D deficiency    Vitamin B12 deficiency    Relevant Medications    vitamin B-12 (VITAMIN B-12) 1,000 mcg tablet   Other Visit Diagnoses     Encounter for annual wellness exam in Medicare patient    -  Primary    Screening for cervical cancer        Relevant Orders    Liquid-based pap, screening ([facility])    Encounter for screening mammogram for breast cancer        Relevant Orders    Mammo screening bilateral w 3d & cad    Abnormal CT of the chest        Relevant Orders    CT chest w contrast    Tobacco use        Relevant Orders    CT chest w contrast    History of recurrent deep vein thrombosis (DVT)        Relevant Orders    VAS lower limb venous duplex study, unilateral/limited    Pain of left calf        Relevant Orders    VAS lower limb venous duplex study, unilateral/limited    Left leg swelling        Relevant Orders    VAS lower limb venous duplex study, unilateral/limited    Other hyperlipidemia        Seasonal allergic rhinitis due to other allergic trigger        Relevant Medications    loratadine (CLARITIN) 10 mg tablet        BMI Counseling: Body mass index is 42 73 kg/m²  The BMI is above normal  Nutrition recommendations include decreasing portion sizes, encouraging healthy choices of fruits and vegetables, decreasing fast food intake, consuming healthier snacks, limiting drinks that contain sugar, moderation in carbohydrate intake, increasing intake of lean protein, reducing intake of saturated and trans fat and reducing intake of cholesterol  Exercise recommendations include exercising 3-5 times per week   No pharmacotherapy was ordered  Rationale for BMI follow-up plan is due to patient being overweight or obese  Depression Screening and Follow-up Plan: Patient was screened for depression during today's encounter  They screened negative with a PHQ-2 score of 1  Falls Plan of Care: balance, strength, and gait training instructions were provided  Recommended assistive device to help with gait and balance  Medications that increase falls were reviewed  Assessed feet and footwear  Vitamin D supplementation was recommended  Home safety education provided  Urinary Incontinence Plan of Care: counseling topics discussed: practice Kegel (pelvic floor strengthening) exercises, use restroom every 2 hours, limit alcohol, caffeine, spicy foods, and acidic foods, limiting fluid intake 3-4 hours before bed, preventing constipation, taking fluid pills at a time when you can get to bathroom easily, limiting fluid intake to 60 oz  per day and wearing ALEX stockings for edema control  Tobacco Cessation Counseling: Tobacco cessation counseling was provided  The patient is sincerely urged to quit consumption of tobacco  She is not ready to quit tobacco  Medication options and side effects of medication discussed  Patient refused medication  Preventive health issues were discussed with patient, and age appropriate screening tests were ordered as noted in patient's After Visit Summary  Personalized health advice and appropriate referrals for health education or preventive services given if needed, as noted in patient's After Visit Summary  History of Present Illness:     Patient presents for a Medicare Wellness Visit    Patient presents to office for annual medicare wellness exam and follow up  Complete medical history and medications reviewed with patient and tolerating all medications well without any problems  Vital signs reviewed and stable    Patient was hospitalized with Sick Sinus Syndrome on 3/12/23 at [address] [place] [address] [facility]  Patient is scheduled for a follow up appointment with [facility] 5/12/23  Patient had a Cardiac Pacer placed during her hospitalization  C/O pain in left lower extremity in posterior calf ongoing for the past several weeks with swelling  Denies any other problems or concerns at the present time  Patient Care Team:  Destiny Leo as PCP - General (Family Medicine)     Review of Systems:     Review of Systems   GENERAL:  Feels well, denies any significant changes in weight without trying  SKIN:  Denies rashes, lesions, opened areas, wounds, change in moles or any other skin changes  HEENT:  Denies any head injury or headaches  Negative blurred vision, floaters, spots before eyes, infections, or other vision problems  Negative significant changes in vision or hearing  Negative tinnitus, vertigo, or infections  Negative hay fever, sinus trouble, nasal discharge, bloody noses, or problems with smell  Negative sore throat, bleeding gums, ulcers, or sores  NECK:  Denies lumps, goiter, pain, swollen glands, or lymphadenopathy  BREASTS:  Denies lumps, pain, nipple discharge, swelling, redness, or any other changes  RESPIRATORY:  Denies cough, wheezing, shortness of breath, dyspnea, or orthopnea  CARDIOVASCULAR:  Denies chest pain or palpitations  GASTROINTESTINAL:  Appetite good, denies nausea, vomiting, or indigestion  Bowel movements normal occurring about once daily or every other day  URINARY:  Denies frequency, urgency, incontinence, dysuria, hematuria, nocturia, or recent flank pain  GENITAL:  Denies vaginal discharge, pelvic infection, lesions, ulcers, or pain  Negative dyspareunia or abnormal vaginal bleeding  PERIPHERAL VASCULAR:  C/O pain behind left calf ongoing for the past several weeks and noticed swelling  MUSCULOSKELETAL:  Denies back, joint, or muscle pain  Negative problems with mobility or movement     PSYCHIATRIC:  Denies problems with depression, anxiety, anger, or other psychiatric symptoms  NEUROLOGIC:  Denies fainting, dizziness, memory problems, seizures, tingling, motor or sensory loss  HEMATOLOGIC:  Denies easy bruising, bleeding, or anemia  ENDOCRINE:  Denies thyroid problems, temperature intolerance, excessive sweating, or other endocrine symptoms          Problem List:     Patient Active Problem List   Diagnosis   • Acquired hypothyroidism   • Essential hypertension   • Pure hypercholesterolemia   • Vitamin D deficiency   • Vitamin B12 deficiency   • Pneumonia of left lower lobe due to infectious organism   • Gastroesophageal reflux disease with esophagitis   • Diverticulitis   • Anxiety state   • Morbid obesity with BMI of 40 0-44 9, adult ([facility] [address] )   • Chronic deep vein thrombosis (DVT) ([facility])   • Acute pain of right knee   • Closed fracture of right tibial plateau   • Closed fracture of upper end of right fibula   • Primary osteoarthritis of left knee   • Sick sinus syndrome [facility])      Past Medical and Surgical History:     Past Medical History:   Diagnosis Date   • Allergic rhinitis    • Carpal tunnel syndrome on both sides    • Cervical disc herniation    • [facility] thrombosis[facility]     DVT Left Leg   • Fracture of foot     left casted   • History of mammogram 2017    Being followed by Dr Gilberto Layton   • History of Papanicolaou smear of cervix 2016    Dr Gilberto Layton   • Hx of colonoscopy 2016    Dr Jason Pires   • Hyperlipemia    • Hypertension    • Hypothyroidism    • Lichen sclerosus    • Renal calculi    • Rotator cuff tear, left      Past Surgical History:   Procedure Laterality Date   • CERVICAL LAMINECTOMY N/A    • CHOLECYSTECTOMY N/A    • KNEE CARTILAGE SURGERY Bilateral    • LITHOTRIPSY N/A    • NEUROPLASTY / TRANSPOSITION MEDIAN NERVE AT CARPAL TUNNEL BILATERAL Bilateral    • ROTATOR CUFF REPAIR Left    • TONSILLECTOMY AND ADENOIDECTOMY Bilateral    • TONSILLECTOMY AND ADENOIDECTOMY     • TUBAL LIGATION N/A    • US GUIDED THYROID BIOPSY  11/2018   • WRIST FRACTURE SURGERY Right     plates and pins       Family History:     Family History   Problem Relation Age of Onset   • Hypertension Mother    • Hyperlipidemia Mother    • Hypothyroidism Mother    • Brain cancer Mother    • Hypertension Father    • Celiac disease Father    • Ovarian cancer Sister    • Celiac disease Brother    • Hypothyroidism Brother    • Diabetes Paternal Grandmother    • Hypothyroidism Sister    • Coronary artery disease Brother    • Hypertension Brother    • Hyperlipidemia Brother    • Diabetes Sister       Social History:     Social History     Socioeconomic History   • Marital status: /Civil Union     Spouse name: None   • Number of children: None   • Years of education: None   • Highest education level: None   Occupational History   • None   Tobacco Use   • Smoking status: Every Day     Packs/day: 0 25     Years: 10 00     Pack years: 2 50     Types: Cigarettes   • Smokeless tobacco: Never   • Tobacco comments:     quite date 1 year   Vaping Use   • Vaping Use: Never used   Substance and Sexual Activity   • Alcohol use: No   • Drug use: No   • Sexual activity: Not Currently   Other Topics Concern   • None   Social History Narrative   • None     Social Determinants of Health     Financial Resource Strain: Not on file   Food Insecurity: Not on file   Transportation Needs: Not on file   Physical Activity: Not on file   Stress: Not on file   Social Connections: Not on file   Intimate Partner Violence: Not on file   Housing Stability: Not on file      Medications and Allergies:     Current Outpatient Medications   Medication Sig Dispense Refill   • acetaminophen (TYLENOL) 325 mg tablet Take 2 tablets (650 mg total) by mouth every 6 (six) hours as needed for mild pain, headaches or fever  0   • ascorbic acid (VITAMIN C) 250 mg tablet Take 500 mg by mouth daily     • aspirin (ECOTRIN LOW STRENGTH) 81 mg EC tablet Take 1 tablet (81 mg total) by mouth daily 90 tablet 1 • atorvastatin (LIPITOR) 40 mg tablet Take 1 tablet (40 mg total) by mouth daily 90 tablet 3   • cholecalciferol (VITAMIN D3) 1,000 units tablet Take 2 tablets (2,000 Units total) by mouth daily 90 tablet 1   • fluticasone (FLONASE) 50 mcg/act nasal spray 1 spray each nostril 2 x daily prn congestion 18 2 mL 5   • levothyroxine 150 mcg tablet Take 1 tablet (150 mcg total) by mouth daily in the early morning 90 tablet 1   • magnesium oxide (MAG-OX) 400 mg Take 1 tablet (400 mg total) by mouth daily at bedtime 90 tablet 1   • vitamin B-12 (VITAMIN B-12) 1,000 mcg tablet Take 1 tablet (1,000 mcg total) by mouth 3 (three) times a week 90 tablet 1   • Zinc 25 MG TABS Take 1 tablet by mouth daily     • loratadine (CLARITIN) 10 mg tablet 1 tab po daily x 14 days then prn allergies 30 tablet 5     No current facility-administered medications for this visit  No Known Allergies   Immunizations:     Immunization History   Administered Date(s) Administered   • INFLUENZA 08/25/2017, 09/19/2018, 10/27/2020   • Influenza, injectable, quadrivalent, preservative free 0 5 mL 09/19/2018   • Pneumococcal Conjugate 13-Valent 10/27/2020   • Pneumococcal Polysaccharide PPV23 01/02/2020   • Tdap 11/20/2017   • Zoster 01/08/2017   • Zoster Vaccine Recombinant 10/03/2018, 10/03/2019      Health Maintenance:         Topic Date Due   • Cervical Cancer Screening  01/21/2023   • Breast Cancer Screening: Mammogram  04/21/2023   • Colorectal Cancer Screening  02/01/2026   • DXA SCAN  04/20/2026   • Hepatitis C Screening  Completed         Topic Date Due   • COVID-19 Vaccine (1) Never done   • Influenza Vaccine (1) 09/01/2022      Medicare Screening Tests and Risk Assessments: Elissa Artis is here for her Subsequent Wellness visit  Last Medicare Wellness visit information reviewed, patient interviewed, no change since last AWV  Health Risk Assessment:   Patient rates overall health as good   Patient feels that their physical health rating is slightly worse  Patient is satisfied with their life  Eyesight was rated as much worse  Hearing was rated as slightly worse  Patient feels that their emotional and mental health rating is slightly better  Patients states they are never, rarely angry  Patient states they are often unusually tired/fatigued  Pain experienced in the last 7 days has been some  Patient's pain rating has been 5/10  Patient states that she has experienced no weight loss or gain in last 6 months  Depression Screening:   PHQ-2 Score: 1      Fall Risk Screening: In the past year, patient has experienced: history of falling in past year    Number of falls: 1  Injured during fall?: Yes    Feels unsteady when standing or walking?: Yes    Worried about falling?: No      Urinary Incontinence Screening:   Patient has leaked urine accidently in the last six months  Home Safety:  Patient does not have trouble with stairs inside or outside of their home  Patient has working smoke alarms and has working carbon monoxide detector  Home safety hazards include: none  Nutrition:   Current diet is Regular  Medications:   Patient is currently taking over-the-counter supplements  OTC medications include: see medication list  Patient is able to manage medications  Activities of Daily Living (ADLs)/Instrumental Activities of Daily Living (IADLs):   Walk and transfer into and out of bed and chair?: Yes  Dress and groom yourself?: Yes    Bathe or shower yourself?: Yes    Feed yourself? Yes  Do your laundry/housekeeping?: Yes  Manage your money, pay your bills and track your expenses?: Yes  Make your own meals?: Yes    Do your own shopping?: Yes    Previous Hospitalizations:   Any hospitalizations or ED visits within the last 12 months?: Yes    How many hospitalizations have you had in the last year?: 1-2    Advance Care Planning:   Living will: Yes    Durable POA for healthcare:  Yes    Advanced directive: Yes    Advanced directive counseling given: Yes    Five wishes given: Yes    Patient declined ACP directive: No    End of Life Decisions reviewed with patient: Yes    Provider agrees with end of life decisions: Yes      Cognitive Screening:   Provider or family/friend/caregiver concerned regarding cognition?: No    PREVENTIVE SCREENINGS      Cardiovascular Screening:    General: History Lipid Disorder, Screening Current and Risks and Benefits Discussed      Diabetes Screening:     General: Screening Current and Risks and Benefits Discussed      Colorectal Cancer Screening:     General: Screening Current    Due for: Cologuard      Breast Cancer Screening:     General: Risks and Benefits Discussed    Due for: Mammogram        Cervical Cancer Screening:    General: Screening Not Indicated and Risks and Benefits Discussed      Osteoporosis Screening:    General: Screening Current and Risks and Benefits Discussed      Abdominal Aortic Aneurysm (AAA) Screening:        General: Risks and Benefits Discussed and Screening Current      Lung Cancer Screening:     General: Screening Not Indicated and Risks and Benefits Discussed      Hepatitis C Screening:    General: Screening Current and Risks and Benefits Discussed    Screening, Brief Intervention, and Referral to Treatment (SBIRT)    Screening  Typical number of drinks in a day: 0  Typical number of drinks in a week: 0  Interpretation: Low risk drinking behavior  Single Item Drug Screening:  How often have you used an illegal drug (including marijuana) or a prescription medication for non-medical reasons in the past year? never    Single Item Drug Screen Score: 0  Interpretation: Negative screen for possible drug use disorder    Annual Depression Screening  Time spent screening and evaluating the patient for depression during today's encounter was 20 minutes      Other Counseling Topics:   Car/seat belt/driving safety, skin self-exam, sunscreen and calcium and vitamin D intake and regular weightbearing exercise  No results found  Physical Exam:     /76 (BP Location: Left arm, Patient Position: Sitting, Cuff Size: Large)   Pulse 95   Temp 98 °F (36 7 °C) (Tympanic)   Resp 18   Wt 127 kg (281 lb)   LMP 01/01/2004   SpO2 97%   BMI 42 73 kg/m²     Physical Exam  Vitals and nursing note reviewed  GENERAL:  Appears well nourished, well groomed, in no acute distress  SKIN:  Palms warm, dry, color good  Nails without clubbing or cyanosis  No lesions, ulcerations, or wounds  HEAD:  Hair is average texture  Scalp without lesions, normocephalic, and atraumatic  EARS:  B/L ear canals clear  B/L TMs clear with + light reflex  NOSE: Mucosa pink, moist, septum midline  Negative sinus tenderness  B/L turbinates pink, moist, non-edematous without exudate  MOUTH:  Oral mucosa pink  Pharynx pink, moist, without swelling, redness, or exudate  Dentition ok  Tongue midline  NECK:  Supple, trachea midline, Negative thyromegaly, lymphadenopathy, or swollen glands  LYMPH NODES:  Negative enlargement of neck, axillary, epitrochlear, or inguinal nodes  THORAX/LUNGS  Thorax symmetric with good excursion  Lungs resonant  Breath sounds vesicular with no added sounds  Diaphragm descends within normal limits  CARDIOVASCULAR:  Carotid upstrokes brisk and without bruits  Apical impulse discrete and tapping, barely palpable in the 5th ICS/MCL  Normal S1 and Normal S2, Negative S3 or S4  + Cardiac Pacer Intact to Left Chest   Negative murmurs, thrills, lifts, or heaves  ABDOMEN:  Protuberant, bowel sounds normal active x 4 quadrants  Negative tenderness  Negative masses  Negative hepatomegaly  Negative splenomegaly  Negative costovertebral tenderness  EXTREMITIES:  + tenderness of left posterior calf upon palpation,   + Gilberto's signs, +1 pitting edema of left lower extremity  +2 pulses throughout  MUSCULOSKELETAL:  Negative joint deformities      Good range of motion in hands, wrists, elbows, shoulders, spine, hips, knees, and ankles  NEUROLOGICAL:  Mental status:  Awake, alert, and oriented to person, place, time, and event  Normal thought processes           KELLIE Wolff

## 2023-04-24 NOTE — PATIENT INSTRUCTIONS
Medicare Preventive Visit Patient Instructions  Thank you for completing your Welcome to Medicare Visit or Medicare Annual Wellness Visit today  Your next wellness visit will be due in one year (4/24/2024)  The screening/preventive services that you may require over the next 5-10 years are detailed below  Some tests may not apply to you based off risk factors and/or age  Screening tests ordered at today's visit but not completed yet may show as past due  Also, please note that scanned in results may not display below  Preventive Screenings:  Service Recommendations Previous Testing/Comments   Colorectal Cancer Screening  * Colonoscopy    * Fecal Occult Blood Test (FOBT)/Fecal Immunochemical Test (FIT)  * Fecal DNA/Cologuard Test  * Flexible Sigmoidoscopy Age: 39-70 years old   Colonoscopy: every 10 years (may be performed more frequently if at higher risk)  OR  FOBT/FIT: every 1 year  OR  Cologuard: every 3 years  OR  Sigmoidoscopy: every 5 years  Screening may be recommended earlier than age 39 if at higher risk for colorectal cancer  Also, an individualized decision between you and your healthcare provider will decide whether screening between the ages of 74-80 would be appropriate  Colonoscopy: 02/01/2016  FOBT/FIT: Not on file  Cologuard: Not on file  Sigmoidoscopy: Not on file    Screening Current     Breast Cancer Screening Age: 36 years old  Frequency: every 1-2 years  Not required if history of left and right mastectomy Mammogram: 04/21/2022    Screening Current   Cervical Cancer Screening Between the ages of 21-29, pap smear recommended once every 3 years  Between the ages of 33-67, can perform pap smear with HPV co-testing every 5 years     Recommendations may differ for women with a history of total hysterectomy, cervical cancer, or abnormal pap smears in past  Pap Smear: 01/21/2020    Screening Not Indicated   Hepatitis C Screening Once for adults born between 1945 and 1965  More frequently in patients at high risk for Hepatitis C Hep C Antibody: 10/01/2021    Screening Current   Diabetes Screening 1-2 times per year if you're at risk for diabetes or have pre-diabetes Fasting glucose: 117 mg/dL (12/21/2022)  A1C: 5 4 % (12/21/2022)  Screening Current   Cholesterol Screening Once every 5 years if you don't have a lipid disorder  May order more often based on risk factors  Lipid panel: 12/21/2022    Screening Not Indicated  History Lipid Disorder     Other Preventive Screenings Covered by Medicare:  Abdominal Aortic Aneurysm (AAA) Screening: covered once if your at risk  You're considered to be at risk if you have a family history of AAA  Lung Cancer Screening: covers low dose CT scan once per year if you meet all of the following conditions: (1) Age 50-69; (2) No signs or symptoms of lung cancer; (3) Current smoker or have quit smoking within the last 15 years; (4) You have a tobacco smoking history of at least 20 pack years (packs per day multiplied by number of years you smoked); (5) You get a written order from a healthcare provider  Glaucoma Screening: covered annually if you're considered high risk: (1) You have diabetes OR (2) Family history of glaucoma OR (3)  aged 48 and older OR (3)  American aged 72 and older  Osteoporosis Screening: covered every 2 years if you meet one of the following conditions: (1) You're estrogen deficient and at risk for osteoporosis based off medical history and other findings; (2) Have a vertebral abnormality; (3) On glucocorticoid therapy for more than 3 months; (4) Have primary hyperparathyroidism; (5) On osteoporosis medications and need to assess response to drug therapy  Last bone density test (DXA Scan): 04/20/2021  HIV Screening: covered annually if you're between the age of 12-76  Also covered annually if you are younger than 13 and older than 72 with risk factors for HIV infection   For pregnant patients, it is covered up to 3 times per pregnancy  Immunizations:  Immunization Recommendations   Influenza Vaccine Annual influenza vaccination during flu season is recommended for all persons aged >= 6 months who do not have contraindications   Pneumococcal Vaccine   * Pneumococcal conjugate vaccine = PCV13 (Prevnar 13), PCV15 (Vaxneuvance), PCV20 (Prevnar 20)  * Pneumococcal polysaccharide vaccine = PPSV23 (Pneumovax) Adults 25-60 years old: 1-3 doses may be recommended based on certain risk factors  Adults 72 years old: 1-2 doses may be recommended based off what pneumonia vaccine you previously received   Hepatitis B Vaccine 3 dose series if at intermediate or high risk (ex: diabetes, end stage renal disease, liver disease)   Tetanus (Td) Vaccine - COST NOT COVERED BY MEDICARE PART B Following completion of primary series, a booster dose should be given every 10 years to maintain immunity against tetanus  Td may also be given as tetanus wound prophylaxis  Tdap Vaccine - COST NOT COVERED BY MEDICARE PART B Recommended at least once for all adults  For pregnant patients, recommended with each pregnancy  Shingles Vaccine (Shingrix) - COST NOT COVERED BY MEDICARE PART B  2 shot series recommended in those aged 48 and above     Health Maintenance Due:      Topic Date Due    Cervical Cancer Screening  01/21/2023    Breast Cancer Screening: Mammogram  04/21/2023    Colorectal Cancer Screening  02/01/2026    DXA SCAN  04/20/2026    Hepatitis C Screening  Completed     Immunizations Due:      Topic Date Due    COVID-19 Vaccine (1) Never done    Influenza Vaccine (1) 09/01/2022     Advance Directives   What are advance directives? Advance directives are legal documents that state your wishes and plans for medical care  These plans are made ahead of time in case you lose your ability to make decisions for yourself  Advance directives can apply to any medical decision, such as the treatments you want, and if you want to donate organs     What are the types of advance directives? There are many types of advance directives, and each state has rules about how to use them  You may choose a combination of any of the following:  Living will: This is a written record of the treatment you want  You can also choose which treatments you do not want, which to limit, and which to stop at a certain time  This includes surgery, medicine, IV fluid, and tube feedings  Durable power of  for healthcare [facility]): This is a written record that states who you want to make healthcare choices for you when you are unable to make them for yourself  This person, called a proxy, is usually a family member or a friend  You may choose more than 1 proxy  Do not resuscitate (DNR) order:  A DNR order is used in case your heart stops beating or you stop breathing  It is a request not to have certain forms of treatment, such as CPR  A DNR order may be included in other types of advance directives  Medical directive: This covers the care that you want if you are in a coma, near death, or unable to make decisions for yourself  You can list the treatments you want for each condition  Treatment may include pain medicine, surgery, blood transfusions, dialysis, IV or tube feedings, and a ventilator (breathing machine)  Values history: This document has questions about your views, beliefs, and how you feel and think about life  This information can help others choose the care that you would choose  Why are advance directives important? An advance directive helps you control your care  Although spoken wishes may be used, it is better to have your wishes written down  Spoken wishes can be misunderstood, or not followed  Treatments may be given even if you do not want them  An advance directive may make it easier for your family to make difficult choices about your care  Fall Prevention    Fall prevention  includes ways to make your home and other areas safer   It also includes ways you can move more carefully to prevent a fall  Health conditions that cause changes in your blood pressure, vision, or muscle strength and coordination may increase your risk for falls  Medicines may also increase your risk for falls if they make you dizzy, weak, or sleepy  Fall prevention tips:   Stand or sit up slowly  Use assistive devices as directed  Wear shoes that fit well and have soles that   Wear a personal alarm  Stay active  Manage your medical conditions  Home Safety Tips:  Add items to prevent falls in the bathroom  Keep paths clear  Install bright lights in your home  Keep items you use often on shelves within reach  [place] or place reflective tape on the edges of your stairs  Urinary Incontinence   Urinary incontinence (UI)  is when you lose control of your bladder  UI develops because your bladder cannot store or empty urine properly  The 3 most common types of UI are stress incontinence, urge incontinence, or both  Medicines:   May be given to help strengthen your bladder control  Report any side effects of medication to your healthcare provider  Do pelvic muscle exercises often:  Your pelvic muscles help you stop urinating  Squeeze these muscles tight for 5 seconds, then relax for 5 seconds  Gradually work up to squeezing for 10 seconds  Do 3 sets of 15 repetitions a day, or as directed  This will help strengthen your pelvic muscles and improve bladder control  Train your bladder:  Go to the bathroom at set times, such as every 2 hours, even if you do not feel the urge to go  You can also try to hold your urine when you feel the urge to go  For example, hold your urine for 5 minutes when you feel the urge to go  As that becomes easier, hold your urine for 10 minutes  Self-care:   Keep a UI record  Write down how often you leak urine and how much you leak  Make a note of what you were doing when you leaked urine  Drink liquids as directed   You may need to limit the amount of liquid you drink to help control your urine leakage  Do not drink any liquid right before you go to bed  Limit or do not have drinks that contain caffeine or alcohol  Prevent constipation  Eat a variety of high-fiber foods  Good examples are high-fiber cereals, beans, vegetables, and whole-grain breads  Walking is the best way to trigger your intestines to have a bowel movement  Exercise regularly and maintain a healthy weight  Weight loss and exercise will decrease pressure on your bladder and help you control your leakage  Use a catheter as directed  to help empty your bladder  A catheter is a tiny, plastic tube that is put into your bladder to drain your urine  Go to behavior therapy as directed  Behavior therapy may be used to help you learn to control your urge to urinate  Cigarette Smoking and Your Health   Risks to your health if you smoke:  Nicotine and other chemicals found in tobacco damage every cell in your body  Even if you are a light smoker, you have an increased risk for cancer, heart disease, and lung disease  If you are pregnant or have diabetes, smoking increases your risk for complications  Benefits to your health if you stop smoking: You decrease respiratory symptoms such as coughing, wheezing, and shortness of breath  You reduce your risk for cancers of the lung, mouth, throat, kidney, bladder, pancreas, stomach, and cervix  If you already have cancer, you increase the benefits of chemotherapy  You also reduce your risk for cancer returning or a second cancer from developing  You reduce your risk for heart disease, blood clots, heart attack, and stroke  You reduce your risk for lung infections, and diseases such as pneumonia, asthma, chronic bronchitis, and emphysema  Your circulation improves  More oxygen can be delivered to your body  If you have diabetes, you lower your risk for complications, such as kidney, artery, and eye diseases   You also lower your risk for nerve damage  Nerve damage can lead to amputations, poor vision, and blindness  You improve your body's ability to heal and to fight infections  For more information and support to stop smoking:   [place]  Phone: 8- 833 - [phone]  Web Address: [employer]  Weight Management   Why it is important to manage your weight:  Being overweight increases your risk of health conditions such as heart disease, high blood pressure, type 2 diabetes, and certain types of cancer  It can also increase your risk for osteoarthritis, sleep apnea, and other respiratory problems  Aim for a slow, steady weight loss  Even a small amount of weight loss can lower your risk of health problems  How to lose weight safely:  A safe and healthy way to lose weight is to eat fewer calories and get regular exercise  You can lose up about 1 pound a week by decreasing the number of calories you eat by 500 calories each day  Healthy meal plan for weight management:  A healthy meal plan includes a variety of foods, contains fewer calories, and helps you stay healthy  A healthy meal plan includes the following:  Eat whole-grain foods more often  A healthy meal plan should contain fiber  Fiber is the part of grains, fruits, and vegetables that is not broken down by your body  Whole-grain foods are healthy and provide extra fiber in your diet  Some examples of whole-grain foods are whole-wheat breads and pastas, oatmeal, brown rice, and bulgur  Eat a variety of vegetables every day  Include dark, leafy greens such as spinach, kale, nataliia greens, and mustard greens  Eat yellow and orange vegetables such as carrots, sweet potatoes, and winter squash  Eat a variety of fruits every day  Choose fresh or canned fruit (canned in its own juice or light syrup) instead of juice  Fruit juice has very little or no fiber  Eat low-fat dairy foods  Drink fat-free (skim) milk or 1% milk   Eat fat-free yogurt and low-fat cottage cheese  Try low-fat cheeses such as mozzarella and other reduced-fat cheeses  Choose meat and other protein foods that are low in fat  Choose beans or other legumes such as split peas or lentils  Choose fish, skinless poultry (chicken or turkey), or lean cuts of red meat (beef or pork)  Before you cook meat or poultry, cut off any visible fat  Use less fat and oil  Try baking foods instead of frying them  Add less fat, such as margarine, sour cream, regular salad dressing and mayonnaise to foods  Eat fewer high-fat foods  Some examples of high-fat foods include french fries, doughnuts, ice cream, and cakes  Eat fewer sweets  Limit foods and drinks that are high in sugar  This includes candy, cookies, regular soda, and sweetened drinks  Exercise:  Exercise at least 30 minutes per day on most days of the week  Some examples of exercise include walking, biking, dancing, and swimming  You can also fit in more physical activity by taking the stairs instead of the elevator or parking farther away from stores  Ask your healthcare provider about the best exercise plan for you  © Copyright [employer] 2018 Information is for End User's use only and may not be sold, redistributed or otherwise used for commercial purposes  All illustrations and images included in CareNotes® are the copyrighted property of A [facility]  or [employer] Visit for Adults   WHAT [place]:   What is a wellness visit? A wellness visit is when you see your healthcare provider to get screened for health problems  Your healthcare provider will also give you advice on how to stay healthy  Write down your questions so you remember to ask them  Ask your healthcare provider how often you should have a wellness visit  What happens at a wellness visit? Your healthcare provider will ask about your health, and your family history of health problems   This includes high blood pressure, heart disease, and cancer  He or she will ask if you have symptoms that concern you, if you smoke, and about your mood  You may also be asked about your intake of medicines, supplements, food, and alcohol  Any of the following may be done: Your weight  will be checked  Your height may also be checked so your body mass index (BMI) can be calculated  Your BMI shows if you are at a healthy weight  Your blood pressure  and heart rate will be checked  Your temperature may also be checked  Blood and urine tests  may be done  Blood tests may be done to check your cholesterol levels  Abnormal cholesterol levels increase your risk for heart disease and stroke  You may also need a blood or urine test to check for diabetes if you are at increased risk  Urine tests may be done to look for signs of an infection or kidney disease  A physical exam  includes checking your heartbeat and lungs with a stethoscope  Your healthcare provider may also check your skin to look for sun damage  Screening tests  may be recommended  A screening test is done to check for diseases that may not cause symptoms  The screening tests you may need depend on your age, gender, family history, and lifestyle habits  For example, colorectal screening may be recommended if you are 48years old or older  What screening tests do I need if I am a woman? A Pap smear  is used to screen for cervical cancer  Pap smears are usually done every 3 to 5 years depending on your age  You may need them more often if you have had abnormal Pap smear test results in the past  Ask your healthcare provider how often you should have a Pap smear  A mammogram  is an x-ray of your breasts to screen for breast cancer  Experts recommend mammograms every 2 years starting at age 48 years  You may need a mammogram at age 52 years or younger if you have an increased risk for breast cancer   Talk to your healthcare provider about when you should start having mammograms and how often you need them  What vaccines might I need? Get an influenza vaccine  every year  The influenza vaccine protects you from the flu  Several types of viruses cause the flu  The viruses change over time, so new vaccines are made each year  Get a tetanus-diphtheria (Td) booster vaccine  every 10 years  This vaccine protects you against tetanus and diphtheria  Tetanus is a severe infection that may cause painful muscle spasms and lockjaw  Diphtheria is a severe bacterial infection that causes a thick covering in the back of your mouth and throat  Get a human papillomavirus (HPV) vaccine  if you are female and aged 23 to 32 or male 23 to 24 and never received it  This vaccine protects you from HPV infection  HPV is the most common infection spread by sexual contact  HPV may also cause vaginal, penile, and anal cancers  Get a pneumococcal vaccine  if you are aged 72 years or older  The pneumococcal vaccine is an injection given to protect you from pneumococcal disease  Pneumococcal disease is an infection caused by pneumococcal bacteria  The infection may cause pneumonia, meningitis, or an ear infection  Get a shingles vaccine  if you are 60 or older, even if you have had shingles before  The shingles vaccine is an injection to protect you from the varicella-zoster virus  This is the same virus that causes chickenpox  Shingles is a painful rash that develops in people who had chickenpox or have been exposed to the virus  How can I eat healthy? My Plate is a model for planning healthy meals  It shows the types and amounts of foods that should go on your plate  Fruits and vegetables make up about half of your plate, and grains and protein make up the other half  A serving of dairy is included on the side of your plate  The amount of calories and serving sizes you need depends on your age, gender, weight, and height   Examples of healthy foods are listed below:  Eat a variety of vegetables  such as dark green, red, and orange vegetables  You can also include canned vegetables low in sodium (salt) and frozen vegetables without added butter or sauces  Eat a variety of fresh fruits , canned fruit in 100% juice, frozen fruit, and dried fruit  Include whole grains  At least half of the grains you eat should be whole grains  Examples include whole-wheat bread, wheat pasta, brown rice, and whole-grain cereals such as oatmeal     Eat a variety of protein foods such as seafood (fish and shellfish), lean meat, and poultry without skin (turkey and chicken)  Examples of lean meats include pork leg, shoulder, or tenderloin, and beef round, sirloin, tenderloin, and extra lean ground beef  Other protein foods include eggs and egg substitutes, beans, peas, soy products, nuts, and seeds  Choose low-fat dairy products such as skim or 1% milk or low-fat yogurt, cheese, and cottage cheese  Limit unhealthy fats  such as butter, hard margarine, and shortening  How much exercise do I need? Exercise at least 30 minutes per day on most days of the week  Some examples of exercise include walking, biking, dancing, and swimming  You can also fit in more physical activity by taking the stairs instead of the elevator or parking farther away from stores  Include muscle strengthening activities 2 days each week  Regular exercise provides many health benefits  It helps you manage your weight, and decreases your risk for type 2 diabetes, heart disease, stroke, and high blood pressure  Exercise can also help improve your mood  Ask your healthcare provider about the best exercise plan for you  What are some general health and safety guidelines I should follow? Do not smoke  Nicotine and other chemicals in cigarettes and cigars can cause lung damage  Ask your healthcare provider for information if you currently smoke and need help to quit  E-cigarettes or smokeless tobacco still contain nicotine   Talk to your healthcare provider before you use these products  Limit alcohol  A drink of alcohol is 12 ounces of beer, 5 ounces of wine, or 1½ ounces of liquor  Lose weight, if needed  Being overweight increases your risk of certain health conditions  These include heart disease, high blood pressure, type 2 diabetes, and certain types of cancer  Protect your skin  Do not sunbathe or use tanning beds  Use sunscreen with a SPF 15 or higher  Apply sunscreen at least 15 minutes before you go outside  Reapply sunscreen every 2 hours  Wear protective clothing, hats, and sunglasses when you are outside  Drive safely  Always wear your seatbelt  Make sure everyone in your car wears a seatbelt  A seatbelt can save your life if you are in an accident  Do not use your cell phone when you are driving  This could distract you and cause an accident  Pull over if you need to make a call or send a text message  Practice safe sex  Use latex condoms if are sexually active and have more than one partner  Your healthcare provider may recommend screening tests for sexually transmitted infections (STIs)  Wear helmets, lifejackets, and protective gear  Always wear a helmet when you ride a bike or motorcycle, go skiing, or play sports that could cause a head injury  Wear protective equipment when you play sports  Wear a lifejacket when you are on a boat or doing water sports  CARE AGREEMENT:   You have the right to help plan your care  Learn about your health condition and how it may be treated  Discuss treatment options with your healthcare providers to decide what care you want to receive  You always have the right to refuse treatment  The above information is an  only  It is not intended as medical advice for individual conditions or treatments  Talk to your doctor, nurse or pharmacist before following any medical regimen to see if it is safe and effective for you    © Copyright [employer] 2022 Information is for End User's use only and may not be sold, redistributed or otherwise used for commercial purposes

## 2023-05-10 ENCOUNTER — HOSPITAL ENCOUNTER (OUTPATIENT)
Dept: NON INVASIVE DIAGNOSTICS | Facility: HOSPITAL | Age: 68
Discharge: HOME | End: 2023-05-10

## 2023-05-10 DIAGNOSIS — Z86.718 HISTORY OF RECURRENT DEEP VEIN THROMBOSIS (DVT): ICD-10-CM

## 2023-05-10 DIAGNOSIS — M79.89 LEFT LEG SWELLING: ICD-10-CM

## 2023-05-10 DIAGNOSIS — M79.662 PAIN OF LEFT CALF: ICD-10-CM

## 2023-05-15 DIAGNOSIS — I82.533 CHRONIC BILATERAL DEEP VEIN THROMBOSIS (DVT) OF POPLITEAL VEINS (HCC): Primary | ICD-10-CM

## 2023-06-09 ENCOUNTER — APPOINTMENT (OUTPATIENT)
Dept: LAB | Facility: CLINIC | Age: 68
End: 2023-06-09
Payer: COMMERCIAL

## 2023-06-09 DIAGNOSIS — E78.2 MIXED HYPERLIPIDEMIA: ICD-10-CM

## 2023-06-09 DIAGNOSIS — E53.8 VITAMIN B12 DEFICIENCY: ICD-10-CM

## 2023-06-09 DIAGNOSIS — I10 PRIMARY HYPERTENSION: ICD-10-CM

## 2023-06-09 DIAGNOSIS — E55.9 VITAMIN D DEFICIENCY: ICD-10-CM

## 2023-06-09 DIAGNOSIS — E03.9 ACQUIRED HYPOTHYROIDISM: ICD-10-CM

## 2023-06-09 DIAGNOSIS — R73.9 HYPERGLYCEMIA: ICD-10-CM

## 2023-06-09 DIAGNOSIS — D64.9 ANEMIA, UNSPECIFIED TYPE: ICD-10-CM

## 2023-06-09 LAB
25(OH)D3 SERPL-MCNC: 40.9 NG/ML (ref 30–100)
ALBUMIN SERPL BCP-MCNC: 3.6 G/DL (ref 3.5–5)
ALP SERPL-CCNC: 84 U/L (ref 46–116)
ALT SERPL W P-5'-P-CCNC: 26 U/L (ref 12–78)
ANION GAP SERPL CALCULATED.3IONS-SCNC: 3 MMOL/L (ref 4–13)
AST SERPL W P-5'-P-CCNC: 22 U/L (ref 5–45)
BASOPHILS # BLD AUTO: 0.08 THOUSANDS/ÂΜL (ref 0–0.1)
BASOPHILS NFR BLD AUTO: 1 % (ref 0–1)
BILIRUB SERPL-MCNC: 0.54 MG/DL (ref 0.2–1)
BUN SERPL-MCNC: 14 MG/DL (ref 5–25)
CALCIUM SERPL-MCNC: 9.5 MG/DL (ref 8.3–10.1)
CHLORIDE SERPL-SCNC: 108 MMOL/L (ref 96–108)
CHOLEST SERPL-MCNC: 227 MG/DL
CO2 SERPL-SCNC: 28 MMOL/L (ref 21–32)
CREAT SERPL-MCNC: 1.01 MG/DL (ref 0.6–1.3)
EOSINOPHIL # BLD AUTO: 0.22 THOUSAND/ÂΜL (ref 0–0.61)
EOSINOPHIL NFR BLD AUTO: 2 % (ref 0–6)
ERYTHROCYTE [DISTWIDTH] IN BLOOD BY AUTOMATED COUNT: 14.5 % (ref 11.6–15.1)
EST. AVERAGE GLUCOSE BLD GHB EST-MCNC: 114 MG/DL
GFR SERPL CREATININE-BSD FRML MDRD: 56 ML/MIN/1.73SQ M
GLUCOSE P FAST SERPL-MCNC: 94 MG/DL (ref 65–99)
HBA1C MFR BLD: 5.6 %
HCT VFR BLD AUTO: 47.5 % (ref 34.8–46.1)
HDLC SERPL-MCNC: 41 MG/DL
HGB BLD-MCNC: 15 G/DL (ref 11.5–15.4)
IMM GRANULOCYTES # BLD AUTO: 0.04 THOUSAND/UL (ref 0–0.2)
IMM GRANULOCYTES NFR BLD AUTO: 0 % (ref 0–2)
INSULIN SERPL-ACNC: 15.16 UIU/ML (ref 1.9–23)
LDLC SERPL CALC-MCNC: 148 MG/DL (ref 0–100)
LYMPHOCYTES # BLD AUTO: 2.64 THOUSANDS/ÂΜL (ref 0.6–4.47)
LYMPHOCYTES NFR BLD AUTO: 25 % (ref 14–44)
MCH RBC QN AUTO: 30.4 PG (ref 26.8–34.3)
MCHC RBC AUTO-ENTMCNC: 31.6 G/DL (ref 31.4–37.4)
MCV RBC AUTO: 96 FL (ref 82–98)
MONOCYTES # BLD AUTO: 0.66 THOUSAND/ÂΜL (ref 0.17–1.22)
MONOCYTES NFR BLD AUTO: 6 % (ref 4–12)
NEUTROPHILS # BLD AUTO: 7.07 THOUSANDS/ÂΜL (ref 1.85–7.62)
NEUTS SEG NFR BLD AUTO: 66 % (ref 43–75)
NONHDLC SERPL-MCNC: 186 MG/DL
NRBC BLD AUTO-RTO: 0 /100 WBCS
PLATELET # BLD AUTO: 211 THOUSANDS/UL (ref 149–390)
PMV BLD AUTO: 11.5 FL (ref 8.9–12.7)
POTASSIUM SERPL-SCNC: 4.3 MMOL/L (ref 3.5–5.3)
PROT SERPL-MCNC: 7.8 G/DL (ref 6.4–8.4)
RBC # BLD AUTO: 4.94 MILLION/UL (ref 3.81–5.12)
SODIUM SERPL-SCNC: 139 MMOL/L (ref 135–147)
TRIGL SERPL-MCNC: 188 MG/DL
TSH SERPL DL<=0.05 MIU/L-ACNC: 3.72 UIU/ML (ref 0.45–4.5)
VIT B12 SERPL-MCNC: 451 PG/ML (ref 180–914)
WBC # BLD AUTO: 10.71 THOUSAND/UL (ref 4.31–10.16)

## 2023-06-09 PROCEDURE — 82306 VITAMIN D 25 HYDROXY: CPT

## 2023-06-09 PROCEDURE — 80053 COMPREHEN METABOLIC PANEL: CPT

## 2023-06-09 PROCEDURE — 83525 ASSAY OF INSULIN: CPT

## 2023-06-09 PROCEDURE — 80061 LIPID PANEL: CPT

## 2023-06-09 PROCEDURE — 84443 ASSAY THYROID STIM HORMONE: CPT

## 2023-06-09 PROCEDURE — 36415 COLL VENOUS BLD VENIPUNCTURE: CPT

## 2023-06-09 PROCEDURE — 83036 HEMOGLOBIN GLYCOSYLATED A1C: CPT

## 2023-06-09 PROCEDURE — 82607 VITAMIN B-12: CPT

## 2023-06-09 PROCEDURE — 85025 COMPLETE CBC W/AUTO DIFF WBC: CPT

## 2023-06-15 ENCOUNTER — HOSPITAL ENCOUNTER (OUTPATIENT)
Dept: CT IMAGING | Facility: HOSPITAL | Age: 68
End: 2023-06-15
Payer: MEDICARE

## 2023-06-15 DIAGNOSIS — Z72.0 TOBACCO USE: ICD-10-CM

## 2023-06-15 DIAGNOSIS — I49.5 SICK SINUS SYNDROME (HCC): ICD-10-CM

## 2023-06-15 DIAGNOSIS — R93.89 ABNORMAL CT OF THE CHEST: ICD-10-CM

## 2023-06-15 PROCEDURE — 71260 CT THORAX DX C+: CPT

## 2023-06-15 PROCEDURE — G1004 CDSM NDSC: HCPCS

## 2023-06-15 RX ADMIN — IOHEXOL 85 ML: 350 INJECTION, SOLUTION INTRAVENOUS at 09:33

## 2023-07-05 ENCOUNTER — OFFICE VISIT (OUTPATIENT)
Dept: VASCULAR SURGERY | Facility: HOSPITAL | Age: 68
End: 2023-07-05
Payer: COMMERCIAL

## 2023-07-05 VITALS
HEIGHT: 68 IN | WEIGHT: 278.2 LBS | OXYGEN SATURATION: 96 % | HEART RATE: 76 BPM | SYSTOLIC BLOOD PRESSURE: 132 MMHG | DIASTOLIC BLOOD PRESSURE: 86 MMHG | BODY MASS INDEX: 42.16 KG/M2

## 2023-07-05 DIAGNOSIS — I82.533 CHRONIC BILATERAL DEEP VEIN THROMBOSIS (DVT) OF POPLITEAL VEINS (HCC): ICD-10-CM

## 2023-07-05 DIAGNOSIS — I82.5Z2 CHRONIC DEEP VEIN THROMBOSIS (DVT) OF DISTAL VEIN OF LEFT LOWER EXTREMITY (HCC): Primary | ICD-10-CM

## 2023-07-05 PROCEDURE — 99213 OFFICE O/P EST LOW 20 MIN: CPT

## 2023-07-05 PROCEDURE — 1160F RVW MEDS BY RX/DR IN RCRD: CPT

## 2023-07-05 PROCEDURE — 1159F MED LIST DOCD IN RCRD: CPT

## 2023-07-05 NOTE — PROGRESS NOTES
Assessment/Plan:    Chronic deep vein thrombosis (DVT) [facility])  68yo female, smoker, with PMH of obesity, LLE popliteal vein DVT '16, GERD, SSS, OA, HLD. She presents today to review LEV done by PCP for intermittent left leg pain and swelling with prolonged activity or standing. -LEV 5/10/23 remarkable for chronic popliteal vein DVT as well as a hypoechoic non-vascularized cystic structure, 3.8  cm, in the popliteal fossa, consistent with a bakers cyst.   -We discussed the pathophysiology of venous insuffiencey and post thrombotic syndrome, risk factors, conservative mgmt and indications for surgical intervention.   -No surgical intervention recommended. Patients symptoms likely multifactorial given hx of LLE DVT with likely post thrombotic syndrome, OA of knee and evidence of bakers cyst in L popliteal fossa. -Recommend conservative measures with use of daily compression hose (20-30 mmHg), lower extremity elevation, regular exercise, weight management, and diligent skin care. -Rx for graded compression stockings 20-30 mmHg provided today.   -Follow up prn.    - Instructed to call the office in the interim with any questions, concerns, or new symptoms. Diagnoses and all orders for this visit:    Chronic deep vein thrombosis (DVT) of distal vein of left lower extremity (HCC)  -     Compression Stocking    Chronic bilateral deep vein thrombosis (DVT) of popliteal veins ([address])  -     Ambulatory Referral to Vascular Surgery          Subjective:      Patient ID: Odette Grady is a 70 y.o. female. HPI  Betty Parents presents to our office to review LEV done 05/10/2023 for left leg pain. She has a hx of a LLE DVT in 2016 and completed appropriate anticoagulation (xarelto). She denies any other episodes of DVT or SVT since then. She had a blood clotting work up which was unremarkable. She c/o intermittent swelling and pain of LLE. She reports she notices the pain more with prolonged standing.  She wears compression only when BLE start to swell and her symptoms improve. She is taking ASA 81 mg. She is not taking Atorvastatin due to side effects. She is a current smoker, 1/4 ppd. The following portions of the patient's history were reviewed and updated as appropriate: allergies, current medications, past family history, past medical history, past social history, past surgical history and problem list.    Review of Systems   Constitutional: Negative. HENT: Negative. Eyes: Negative. Respiratory: Negative. Cardiovascular: Negative. Negative for leg swelling. Gastrointestinal: Negative. Endocrine: Negative. Genitourinary: Negative. Musculoskeletal: Positive for arthralgias. Skin: Negative. Allergic/Immunologic: Negative. Neurological: Negative. Hematological: Negative. Psychiatric/Behavioral: Negative. I have personally reviewed and made appropriate changes to the ROS that was input by the medical assistant.        Objective:      Vitals:    07/05/23 1325   BP: 132/86   BP Location: Left arm   Patient Position: Sitting   Cuff Size: Standard   Pulse: 76   SpO2: 96%   Weight: 126 kg (278 lb 3.2 oz)   Height: 5' 8" (1.727 m)       Patient Active Problem List   Diagnosis   • Acquired hypothyroidism   • Essential hypertension   • Pure hypercholesterolemia   • Vitamin D deficiency   • Vitamin B12 deficiency   • Pneumonia of left lower lobe due to infectious organism   • Gastroesophageal reflux disease with esophagitis   • Diverticulitis   • Anxiety state   • Morbid obesity with BMI of 40.0-44.9, adult ([facility])   • Chronic deep vein thrombosis (DVT) ([facility])   • Acute pain of right knee   • Closed fracture of right tibial plateau   • Closed fracture of upper end of right fibula   • Primary osteoarthritis of left knee   • Sick sinus syndrome ([facility])       Past Surgical History:   Procedure Laterality Date   • CERVICAL LAMINECTOMY N/A    • CHOLECYSTECTOMY N/A    • KNEE CARTILAGE SURGERY Bilateral    • LITHOTRIPSY N/A    • NEUROPLASTY / TRANSPOSITION MEDIAN NERVE AT CARPAL TUNNEL BILATERAL Bilateral    • ROTATOR CUFF REPAIR Left    • TONSILLECTOMY AND ADENOIDECTOMY Bilateral    • TONSILLECTOMY AND ADENOIDECTOMY     • TUBAL LIGATION N/A    • US GUIDED THYROID BIOPSY  11/2018   • WRIST FRACTURE SURGERY Right     plates and pins        Family History   Problem Relation Age of Onset   • Hypertension Mother    • Hyperlipidemia Mother    • Hypothyroidism Mother    • Brain cancer Mother    • Hypertension Father    • Celiac disease Father    • Ovarian cancer Sister    • Celiac disease Brother    • Hypothyroidism Brother    • Diabetes Paternal Grandmother    • Hypothyroidism Sister    • Coronary artery disease Brother    • Hypertension Brother    • Hyperlipidemia Brother    • Diabetes Sister        Social History     Socioeconomic History   • Marital status: /Civil Union     Spouse name: Not on file   • Number of children: Not on file   • Years of education: Not on file   • Highest education level: Not on file   Occupational History   • Not on file   Tobacco Use   • Smoking status: Every Day     Packs/day: 0.25     Years: 10.00     Total pack years: 2.50     Types: Cigarettes   • Smokeless tobacco: Never   • Tobacco comments:     quite date 1 year   Vaping Use   • Vaping Use: Never used   Substance and Sexual Activity   • Alcohol use: No   • Drug use: No   • Sexual activity: Not Currently   Other Topics Concern   • Not on file   Social History Narrative   • Not on file     Social Determinants of Health     Financial Resource Strain: Not on file   Food Insecurity: Not on file   Transportation Needs: Not on file   Physical Activity: Not on file   Stress: Not on file   Social Connections: Not on file   Intimate Partner Violence: Not on file   Housing Stability: Not on file       Allergies   Allergen Reactions   • Atorvastatin Myalgia         Current Outpatient Medications:   •  acetaminophen (TYLENOL) 325 mg tablet, Take 2 tablets (650 mg total) by mouth every 6 (six) hours as needed for mild pain, headaches or fever, Disp: , Rfl: 0  •  ascorbic acid (VITAMIN C) 250 mg tablet, Take 500 mg by mouth daily, Disp: , Rfl:   •  aspirin (ECOTRIN LOW STRENGTH) 81 mg EC tablet, Take 1 tablet (81 mg total) by mouth daily, Disp: 90 tablet, Rfl: 1  •  cholecalciferol (VITAMIN D3) 1,000 units tablet, Take 2 tablets (2,000 Units total) by mouth daily, Disp: 90 tablet, Rfl: 1  •  fluticasone (FLONASE) 50 mcg/act nasal spray, 1 spray each nostril 2 x daily prn congestion, Disp: 18.2 mL, Rfl: 5  •  levothyroxine 150 mcg tablet, Take 1 tablet (150 mcg total) by mouth daily in the early morning, Disp: 90 tablet, Rfl: 1  •  loratadine (CLARITIN) 10 mg tablet, 1 tab po daily x 14 days then prn allergies, Disp: 30 tablet, Rfl: 5  •  magnesium oxide (MAG-OX) 400 mg, Take 1 tablet (400 mg total) by mouth daily at bedtime, Disp: 90 tablet, Rfl: 1  •  vitamin B-12 (VITAMIN B-12) 1,000 mcg tablet, Take 1 tablet (1,000 mcg total) by mouth 3 (three) times a week, Disp: 90 tablet, Rfl: 1  •  Zinc 25 MG TABS, Take 1 tablet by mouth daily, Disp: , Rfl:   •  atorvastatin (LIPITOR) 40 mg tablet, Take 1 tablet (40 mg total) by mouth daily (Patient not taking: Reported on 7/5/2023), Disp: 90 tablet, Rfl: 3      /86 (BP Location: Left arm, Patient Position: Sitting, Cuff Size: Standard)   Pulse 76   Ht 5' 8" (1.727 m)   Wt 126 kg (278 lb 3.2 oz)   LMP 01/01/2004   SpO2 96%   BMI 42.30 kg/m²          Physical Exam  Vitals and nursing note reviewed. Constitutional:       Appearance: Normal appearance. She is obese. HENT:      Head: Normocephalic and atraumatic. Cardiovascular:      Rate and Rhythm: Normal rate and regular rhythm. Pulses:           Carotid pulses are 2+ on the right side and 2+ on the left side. Radial pulses are 2+ on the right side and 2+ on the left side.         Dorsalis pedis pulses are 2+ on the right side and 2+ on the left side. Heart sounds: Normal heart sounds. Pulmonary:      Effort: Pulmonary effort is normal. No respiratory distress. Breath sounds: Normal breath sounds. Musculoskeletal:         General: No swelling or tenderness. Normal range of motion. Cervical back: Normal range of motion and neck supple. Right lower leg: No edema. Left lower leg: No edema. Skin:     General: Skin is warm. Capillary Refill: Capillary refill takes less than 2 seconds. Findings: No erythema. Neurological:      General: No focal deficit present. Mental Status: She is alert and oriented to person, place, and time. Psychiatric:         Mood and Affect: Mood normal.         Behavior: Behavior normal.       Cora Marcial PA-C  [facility]  [phone]    I have spent a total time of 25 minutes on 07/05/23 in caring for this patient including Diagnostic results, Prognosis, Risks and benefits of tx options, Instructions for management, Patient and family education, Importance of tx compliance, Risk factor reductions, Impressions, Counseling / Coordination of care, Documenting in the medical record, Reviewing / ordering tests, medicine, procedures   and Obtaining or reviewing history  .

## 2023-07-05 NOTE — ASSESSMENT & PLAN NOTE
68yo female, smoker, with PMH of obesity, LLE popliteal vein DVT '16, GERD, SSS, OA, HLD. She presents today to review LEV done by PCP for intermittent left leg pain and swelling with prolonged activity or standing. -LEV 5/10/23 remarkable for chronic popliteal vein DVT as well as a hypoechoic non-vascularized cystic structure, 3.8  cm, in the popliteal fossa, consistent with a bakers cyst.   -We discussed the pathophysiology of venous insuffiencey and post thrombotic syndrome, risk factors, conservative mgmt and indications for surgical intervention.   -No surgical intervention recommended. Patients symptoms likely multifactorial given hx of LLE DVT with likely post thrombotic syndrome, OA of knee and evidence of bakers cyst in L popliteal fossa. -Recommend conservative measures with use of daily compression hose (20-30 mmHg), lower extremity elevation, regular exercise, weight management, and diligent skin care. -Rx for graded compression stockings 20-30 mmHg provided today.   -Follow up prn.    - Instructed to call the office in the interim with any questions, concerns, or new symptoms. MARVIN Camara

## 2023-07-11 ENCOUNTER — CONSULT (OUTPATIENT)
Dept: FAMILY MEDICINE CLINIC | Facility: CLINIC | Age: 68
End: 2023-07-11
Payer: COMMERCIAL

## 2023-07-11 VITALS
OXYGEN SATURATION: 96 % | SYSTOLIC BLOOD PRESSURE: 124 MMHG | BODY MASS INDEX: 42.27 KG/M2 | WEIGHT: 278 LBS | DIASTOLIC BLOOD PRESSURE: 80 MMHG | RESPIRATION RATE: 18 BRPM | TEMPERATURE: 98.1 F | HEART RATE: 92 BPM

## 2023-07-11 DIAGNOSIS — Z01.818 PRE-OP EVALUATION: Primary | ICD-10-CM

## 2023-07-11 DIAGNOSIS — I10 ESSENTIAL HYPERTENSION: ICD-10-CM

## 2023-07-11 DIAGNOSIS — Z87.891 PERSONAL HISTORY OF NICOTINE DEPENDENCE: ICD-10-CM

## 2023-07-11 DIAGNOSIS — I49.5 SICK SINUS SYNDROME (HCC): ICD-10-CM

## 2023-07-11 PROCEDURE — 99214 OFFICE O/P EST MOD 30 MIN: CPT | Performed by: FAMILY MEDICINE

## 2023-07-11 NOTE — PROGRESS NOTES
Subjective: Victor Manuel Padilla is a 70 y.o. female who presents to the office today for a preoperative consultation at the request of surgeon - ophthalmologist who plans on performing cataract surgery on her R followed by L eye in August.  R eye is tomorrow. This consultation is requested for the specific conditions prompting preoperative evaluation (i.e. because of potential affect on operative risk): none. Planned anesthesia is MAC. The patient has the following known anesthesia issues: n/a. Patient has a bleeding risk of: no recent abnormal bleeding and no remote history of abnormal bleeding. Patient does not have objections to receiving blood products if needed. She is s/p DDDR pacemaker placement for SSS and has done well, since. She is a smoker, ~ 5/day. She did have LLL pulmonary nodule and has appt with Pulmonary for this but is asking to see her images. She denies sob/wheeze, menendez, etc.  She will also est with Dr. Chato Roque - Cardiology for her SSS/pacemaker surveillance, etc.      The following portions of the patient's history were reviewed and updated as appropriate: allergies, current medications, past family history, past medical history, past social history, past surgical history and problem list.    Review of Systems  Pertinent items are noted in HPI.        Objective:      Physical Exam  LMP 01/01/2004     General Appearance:    Alert, cooperative, no distress, appears stated age   Head:    Normocephalic, without obvious abnormality, atraumatic   Eyes:    PERRL, conjunctiva/corneas clear   Ears:    Normal TM's and external ear canals, both ears   Nose:   Nares normal, septum midline, mucosa normal, no drainage    or sinus tenderness   Throat:   Lips, mucosa, and tongue normal; teeth and gums normal   Neck:   Supple, symmetrical, trachea midline, no adenopathy;     thyroid:  no enlargement/tenderness/nodules; no carotid    bruit or JVD   Back:     Symmetric   Lungs:     Clear to auscultation bilaterally, respirations unlabored   Chest Wall:    No tenderness or deformity    Heart:    Regular rate and rhythm, S1 and S2 normal, no murmur, rub   or gallop   Breast Exam:    N/A    Abdomen:     Soft, non-tender, bowel sounds active all four quadrants   Genitalia:    N/A    Rectal:    N/A    Extremities:   Extremities normal, atraumatic, no cyanosis or edema   Pulses:   2+ and symmetric all extremities   Skin:   Skin color, texture, turgor normal, no rashes or lesions   Lymph nodes:   Cervical, supraclavicular nodes normal   Neurologic:   Non-Focal        Predictors of intubation difficulty:  N/a     Cardiographics  ECG: n/a  Echocardiogram: not done    Imaging  Chest x-ray: n/a     Lab Review   not applicable       Assessment:      70 y.o. female with planned surgery as above. Known risk factors for perioperative complications: None    Difficulty with intubation - n/a     Current medications which may produce withdrawal symptoms if withheld perioperatively: per surgeon's recommendations. Plan:      1. Preoperative workup as follows:  Pre-op exam - completed and form sent to surgeon's office   2. Change in medication regimen before surgery: per surgery team.  3. Prophylaxis for cardiac events with perioperative beta-blockers: n/a        Sick sinus syndrome ([address])  - doing well. Will see Dr. Jimmy Choudhury. Essential hypertension  Stable, at goal.  Discussed the importance of maintaining a healthy lifestyle through heart healthy diet and exercise. Personal history of nicotine dependence  - cont to smoke. Had abnormal Lung Scan and will see Pulmonary med.

## 2023-08-25 ENCOUNTER — CONSULT (OUTPATIENT)
Dept: PULMONOLOGY | Facility: CLINIC | Age: 68
End: 2023-08-25
Payer: COMMERCIAL

## 2023-08-25 VITALS
WEIGHT: 271 LBS | DIASTOLIC BLOOD PRESSURE: 84 MMHG | SYSTOLIC BLOOD PRESSURE: 122 MMHG | HEART RATE: 103 BPM | BODY MASS INDEX: 41.07 KG/M2 | HEIGHT: 68 IN | OXYGEN SATURATION: 100 %

## 2023-08-25 DIAGNOSIS — I49.5 SICK SINUS SYNDROME (HCC): ICD-10-CM

## 2023-08-25 DIAGNOSIS — Z87.891 ENCOUNTER FOR SCREENING FOR MALIGNANT NEOPLASM OF LUNG IN PATIENT WITH LESS THAN 30 PACK YEAR SMOKING HISTORY: ICD-10-CM

## 2023-08-25 DIAGNOSIS — R91.1 LUNG NODULE: Primary | ICD-10-CM

## 2023-08-25 DIAGNOSIS — R91.1 PULMONARY NODULE: ICD-10-CM

## 2023-08-25 DIAGNOSIS — R06.83 SNORING: ICD-10-CM

## 2023-08-25 DIAGNOSIS — Z87.891 HISTORY OF TOBACCO USE: ICD-10-CM

## 2023-08-25 DIAGNOSIS — Z12.2 ENCOUNTER FOR SCREENING FOR MALIGNANT NEOPLASM OF LUNG IN PATIENT WITH LESS THAN 30 PACK YEAR SMOKING HISTORY: ICD-10-CM

## 2023-08-25 DIAGNOSIS — E66.01 MORBID OBESITY WITH BMI OF 40.0-44.9, ADULT (HCC): ICD-10-CM

## 2023-08-25 DIAGNOSIS — I82.5Z2 CHRONIC DEEP VEIN THROMBOSIS (DVT) OF DISTAL VEIN OF LEFT LOWER EXTREMITY (HCC): ICD-10-CM

## 2023-08-25 DIAGNOSIS — F17.200 NICOTINE DEPENDENCE WITH CURRENT USE: ICD-10-CM

## 2023-08-25 PROBLEM — J18.9 PNEUMONIA OF LEFT LOWER LOBE DUE TO INFECTIOUS ORGANISM: Status: RESOLVED | Noted: 2018-05-24 | Resolved: 2023-08-25

## 2023-08-25 PROBLEM — Z86.718 HISTORY OF DEEP VEIN THROMBOSIS (DVT) OF LOWER EXTREMITY: Status: ACTIVE | Noted: 2021-10-11

## 2023-08-25 PROBLEM — I82.502 CHRONIC DEEP VEIN THROMBOSIS (DVT) OF LEFT LOWER EXTREMITY (HCC): Status: ACTIVE | Noted: 2021-10-11

## 2023-08-25 PROCEDURE — 99244 OFF/OP CNSLTJ NEW/EST MOD 40: CPT | Performed by: INTERNAL MEDICINE

## 2023-08-25 NOTE — PROGRESS NOTES
Consultation - Pulmonary Medicine   Edda Soto 70 y.o. female MRN: [id number]    Physician Requesting Consult: Dewey Thornton, [address] [facility]  Reason for Consult: Lung nodule    Lung nodule  Tiny lung nodule in the left lower lobe, most likely benign but given her smoking history will follow based on guidelines in 1 year with repeat CT scan. If stable we can continue lung cancer screening CT scans in the future. Snoring  Counseled patient about obstructive sleep apnea, not interested in CPAP and currently she feels fine, in the future if she has more symptoms she will let me know to order sleep study. Nicotine dependence with current use  Counseled patient quickly about smoking cessation but not interested currently. She has no respiratory symptoms so no indication for PFTs. Morbid obesity with BMI of 40.0-44.9, adult ([address])  Encourage patient to decrease calorie intake and lose weight    Chronic deep vein thrombosis (DVT) of left lower extremity (HCC)  History of DVT, unprovoked, currently doing fine. She had recent duplex study that showed a chronic left popliteal DVT and was addressed by PCP.     Encounter for screening for malignant neoplasm of lung in patient with less than 30 pack year smoking history  We will continue lung cancer screening in the future    Sick sinus syndrome ([address])  Status post pacemaker, doing fine      Diagnoses and all orders for this visit:    Lung nodule  -     CT chest without contrast; Future    Pulmonary nodule  -     Ambulatory Referral to Pulmonology    History of tobacco use  -     Ambulatory Referral to Pulmonology    Chronic deep vein thrombosis (DVT) of distal vein of left lower extremity (HCC)    Sick sinus syndrome ([address])    Morbid obesity with BMI of 40.0-44.9, adult ([address])    Snoring    Encounter for screening for malignant neoplasm of lung in patient with less than 30 pack year smoking history    Nicotine dependence with current use  -     CT chest without contrast; Future      ______________________________________________________________________    HPI:    Tracey Bergeron is a 70 y.o. female who presents for evaluation of lung nodule. Patient had CT scan recently that showed lung nodule so she was referred for evaluation. She denies history of pulmonary disease. Patient denies any shortness of breath or wheezing or cough or sputum production, denies chest pain, denies hemoptysis, denies fever chills or night sweats, denies weight loss. She used to have dyspnea on exertion but then she was diagnosed with sick sinus syndrome and bradycardia and had pacemaker and her symptoms resolved. Currently denies shortness of breath. She denies legs edema. She has snoring but no witnessed apneic episodes and no sleep choking and she denies restless leg syndrome. She has mild excessive daytime sleepiness. She has no history of cancers. She is up-to-date with mammograms and colonoscopy. She had history of left lower extremity DVT in 2015/16 without clear etiology at that time, treated with anticoagulation for several months and stopped. She also has Baker's cyst in her left lower extremity. She denies dysphagia or or GERD. She lives at home with her , no pets, no occupational exposure, she worked in nursing field in the past.  She smoked on average 0.5 pack/day for about 44 years and currently cut down to 5 cigarettes/day. Review of Systems:  Review of Systems   Constitutional: Negative. HENT: Negative. Eyes: Negative. Respiratory: Negative. Cardiovascular: Negative. Gastrointestinal: Negative. Endocrine: Negative. Genitourinary: Negative. Musculoskeletal: Negative. Skin: Negative. Allergic/Immunologic: Negative. Neurological: Negative. Hematological: Negative. Psychiatric/Behavioral: Negative. Aside from what is mentioned in the HPI, the review of systems otherwise negative.     Current Medications:    Current Outpatient Medications:   •  acetaminophen (TYLENOL) 325 mg tablet, Take 2 tablets (650 mg total) by mouth every 6 (six) hours as needed for mild pain, headaches or fever, Disp: , Rfl: 0  •  ascorbic acid (VITAMIN C) 250 mg tablet, Take 500 mg by mouth daily, Disp: , Rfl:   •  cholecalciferol (VITAMIN D3) 1,000 units tablet, Take 2 tablets (2,000 Units total) by mouth daily, Disp: 90 tablet, Rfl: 1  •  fluticasone (FLONASE) 50 mcg/act nasal spray, 1 spray each nostril 2 x daily prn congestion, Disp: 18.2 mL, Rfl: 5  •  levothyroxine 150 mcg tablet, Take 1 tablet (150 mcg total) by mouth daily in the early morning, Disp: 90 tablet, Rfl: 1  •  loratadine (CLARITIN) 10 mg tablet, 1 tab po daily x 14 days then prn allergies, Disp: 30 tablet, Rfl: 5  •  vitamin B-12 (VITAMIN B-12) 1,000 mcg tablet, Take 1 tablet (1,000 mcg total) by mouth 3 (three) times a week, Disp: 90 tablet, Rfl: 1  •  Zinc 25 MG TABS, Take 1 tablet by mouth daily, Disp: , Rfl:   •  aspirin (ECOTRIN LOW STRENGTH) 81 mg EC tablet, Take 1 tablet (81 mg total) by mouth daily (Patient not taking: Reported on 8/25/2023), Disp: 90 tablet, Rfl: 1  •  magnesium oxide (MAG-OX) 400 mg, Take 1 tablet (400 mg total) by mouth daily at bedtime (Patient not taking: Reported on 8/25/2023), Disp: 90 tablet, Rfl: 1    Historical Information   Past Medical History:   Diagnosis Date   • Allergic rhinitis    • Carpal tunnel syndrome on both sides    • Cervical disc herniation    • DVT (deep venous thrombosis) (HCC)     DVT Left Leg   • Fracture of foot     left casted   • History of mammogram 2017    Being followed by Dr. Red Schlatter   • History of Papanicolaou smear of cervix 2016    Dr. Red Schlatter   • Hx of colonoscopy 2016    Dr. Troy Joseph   • Hyperlipemia    • Hypertension    • Hypothyroidism    • Lichen sclerosus    • Renal calculi    • Rotator cuff tear, left      Past Surgical History:   Procedure Laterality Date   • CERVICAL LAMINECTOMY N/A    • CHOLECYSTECTOMY N/A    • KNEE CARTILAGE SURGERY Bilateral    • LITHOTRIPSY N/A    • NEUROPLASTY / TRANSPOSITION MEDIAN NERVE AT CARPAL TUNNEL BILATERAL Bilateral    • ROTATOR CUFF REPAIR Left    • TONSILLECTOMY AND ADENOIDECTOMY Bilateral    • TONSILLECTOMY AND ADENOIDECTOMY     • TUBAL LIGATION N/A    • US GUIDED THYROID BIOPSY  11/2018   • WRIST FRACTURE SURGERY Right     plates and pins      Social History   Social History     Tobacco Use   Smoking Status Every Day   • Packs/day: 0.25   • Years: 10.00   • Total pack years: 2.50   • Types: Cigarettes   Smokeless Tobacco Never   Tobacco Comments    8/25/23-smoke 5 cigarettes daily. Occupational history:  No occupational exposure    Family History:   Family History   Problem Relation Age of Onset   • Hypertension Mother    • Hyperlipidemia Mother    • Hypothyroidism Mother    • Brain cancer Mother    • Hypertension Father    • Celiac disease Father    • Ovarian cancer Sister    • Celiac disease Brother    • Hypothyroidism Brother    • Diabetes Paternal Grandmother    • Hypothyroidism Sister    • Coronary artery disease Brother    • Hypertension Brother    • Hyperlipidemia Brother    • Diabetes Sister          PhysicalExamination:  Vitals:   /84 (BP Location: Left arm, Patient Position: Sitting, Cuff Size: Large)   Pulse 103   Ht 5' 8" (1.727 m)   Wt 123 kg (271 lb)   LMP 01/01/2004   SpO2 100%   BMI 41.21 kg/m²     General: alert, not in acute distress  HEENT: PERRL, no icteric sclera or cyanosis, no thrush  Neck: Supple, no lymphadenopathy or thyromegaly, no JVD  Lungs: Equal breath sounds and clear auscultations bilaterally, no wheezing or crackles  Heart: S1S2 regular, no murmurs or gallops  Abdomen: soft, nontender, bowel sounds present  Extremities: no edema, no clubbing or cyanosis  Neuro: Alert and oriented x 3, no focal neurodeficits   Skin: intact, no rashes      Diagnostic Data:  Labs:   I personally reviewed the most recent laboratory data pertinent to today's visit    Lab Results   Component Value Date    WBC 10.71 (H) 06/09/2023    HGB 15.0 06/09/2023    HCT 47.5 (H) 06/09/2023    MCV 96 06/09/2023     06/09/2023     Lab Results   Component Value Date    CALCIUM 9.5 06/09/2023    K 4.3 06/09/2023    CO2 28 06/09/2023     06/09/2023    BUN 14 06/09/2023    CREATININE 1.01 06/09/2023     No results found for: "IGE"  Lab Results   Component Value Date    ALT 26 06/09/2023    AST 22 06/09/2023    ALKPHOS 84 06/09/2023       Imaging:  I personally reviewed the images on the [facility] system pertinent to today's visit  Chest CT scan reviewed on PACS with the patient in the room: Clear lungs parenchyma, no emphysema, small probably calcified basilar left lower lobe lung nodule measuring about 4 mm        Irvin Gamino MD

## 2023-08-25 NOTE — ASSESSMENT & PLAN NOTE
Tiny lung nodule in the left lower lobe, most likely benign but given her smoking history will follow based on guidelines in 1 year with repeat CT scan. If stable we can continue lung cancer screening CT scans in the future.

## 2023-08-25 NOTE — ASSESSMENT & PLAN NOTE
History of DVT, unprovoked, currently doing fine. She had recent duplex study that showed a chronic left popliteal DVT and was addressed by PCP.

## 2023-08-25 NOTE — ASSESSMENT & PLAN NOTE
Counseled patient about obstructive sleep apnea, not interested in CPAP and currently she feels fine, in the future if she has more symptoms she will let me know to order sleep study.

## 2023-08-25 NOTE — ASSESSMENT & PLAN NOTE
Counseled patient quickly about smoking cessation but not interested currently. She has no respiratory symptoms so no indication for PFTs.

## 2023-10-01 ENCOUNTER — APPOINTMENT (EMERGENCY)
Dept: RADIOLOGY | Facility: HOSPITAL | Age: 68
End: 2023-10-01
Payer: COMMERCIAL

## 2023-10-01 ENCOUNTER — HOSPITAL ENCOUNTER (EMERGENCY)
Facility: HOSPITAL | Age: 68
Discharge: HOME | End: 2023-10-02
Attending: EMERGENCY MEDICINE | Admitting: EMERGENCY MEDICINE
Payer: COMMERCIAL

## 2023-10-01 DIAGNOSIS — R07.9 CHEST PAIN, UNSPECIFIED TYPE: Primary | ICD-10-CM

## 2023-10-01 LAB
ALBUMIN SERPL BCP-MCNC: 3.7 G/DL (ref 3.5–5)
ALP SERPL-CCNC: 66 U/L (ref 34–104)
ALT SERPL W P-5'-P-CCNC: 11 U/L (ref 7–52)
ANION GAP SERPL CALCULATED.3IONS-SCNC: 6 MMOL/L
AST SERPL W P-5'-P-CCNC: 14 U/L (ref 13–39)
BASOPHILS # BLD AUTO: 0.11 THOUSANDS/ÂΜL (ref 0–0.1)
BASOPHILS NFR BLD AUTO: 1 % (ref 0–1)
BILIRUB SERPL-MCNC: 0.38 MG/DL (ref 0.2–1)
BUN SERPL-MCNC: 17 MG/DL (ref 5–25)
CALCIUM SERPL-MCNC: 8.8 MG/DL (ref 8.4–10.2)
CHLORIDE SERPL-SCNC: 107 MMOL/L (ref 96–108)
CO2 SERPL-SCNC: 25 MMOL/L (ref 21–32)
CREAT SERPL-MCNC: 0.78 MG/DL (ref 0.6–1.3)
EOSINOPHIL # BLD AUTO: 0.25 THOUSAND/ÂΜL (ref 0–0.61)
EOSINOPHIL NFR BLD AUTO: 1 % (ref 0–6)
ERYTHROCYTE [DISTWIDTH] IN BLOOD BY AUTOMATED COUNT: 14.6 % (ref 11.6–15.1)
GFR SERPL CREATININE-BSD FRML MDRD: 77 ML/MIN/1.73SQ M
GLUCOSE SERPL-MCNC: 101 MG/DL (ref 65–140)
HCT VFR BLD AUTO: 44.1 % (ref 34.8–46.1)
HGB BLD-MCNC: 13.9 G/DL (ref 11.5–15.4)
IMM GRANULOCYTES # BLD AUTO: 0.09 THOUSAND/UL (ref 0–0.2)
IMM GRANULOCYTES NFR BLD AUTO: 1 % (ref 0–2)
LYMPHOCYTES # BLD AUTO: 3.05 THOUSANDS/ÂΜL (ref 0.6–4.47)
LYMPHOCYTES NFR BLD AUTO: 16 % (ref 14–44)
MCH RBC QN AUTO: 30.9 PG (ref 26.8–34.3)
MCHC RBC AUTO-ENTMCNC: 31.5 G/DL (ref 31.4–37.4)
MCV RBC AUTO: 98 FL (ref 82–98)
MONOCYTES # BLD AUTO: 1.42 THOUSAND/ÂΜL (ref 0.17–1.22)
MONOCYTES NFR BLD AUTO: 7 % (ref 4–12)
NEUTROPHILS # BLD AUTO: 14.42 THOUSANDS/ÂΜL (ref 1.85–7.62)
NEUTS SEG NFR BLD AUTO: 74 % (ref 43–75)
NRBC BLD AUTO-RTO: 0 /100 WBCS
PLATELET # BLD AUTO: 187 THOUSANDS/UL (ref 149–390)
PMV BLD AUTO: 10.6 FL (ref 8.9–12.7)
POTASSIUM SERPL-SCNC: 3.9 MMOL/L (ref 3.5–5.3)
PROT SERPL-MCNC: 7 G/DL (ref 6.4–8.4)
RBC # BLD AUTO: 4.5 MILLION/UL (ref 3.81–5.12)
SODIUM SERPL-SCNC: 138 MMOL/L (ref 135–147)
WBC # BLD AUTO: 19.34 THOUSAND/UL (ref 4.31–10.16)

## 2023-10-01 PROCEDURE — 93005 ELECTROCARDIOGRAM TRACING: CPT

## 2023-10-01 PROCEDURE — 36415 COLL VENOUS BLD VENIPUNCTURE: CPT | Performed by: EMERGENCY MEDICINE

## 2023-10-01 PROCEDURE — 84484 ASSAY OF TROPONIN QUANT: CPT | Performed by: EMERGENCY MEDICINE

## 2023-10-01 PROCEDURE — 80053 COMPREHEN METABOLIC PANEL: CPT | Performed by: EMERGENCY MEDICINE

## 2023-10-01 PROCEDURE — 85025 COMPLETE CBC W/AUTO DIFF WBC: CPT | Performed by: EMERGENCY MEDICINE

## 2023-10-01 PROCEDURE — 71045 X-RAY EXAM CHEST 1 VIEW: CPT

## 2023-10-01 PROCEDURE — 99285 EMERGENCY DEPT VISIT HI MDM: CPT

## 2023-10-01 PROCEDURE — 99285 EMERGENCY DEPT VISIT HI MDM: CPT | Performed by: EMERGENCY MEDICINE

## 2023-10-02 VITALS
OXYGEN SATURATION: 96 % | DIASTOLIC BLOOD PRESSURE: 61 MMHG | TEMPERATURE: 98.5 F | BODY MASS INDEX: 44.64 KG/M2 | RESPIRATION RATE: 24 BRPM | SYSTOLIC BLOOD PRESSURE: 140 MMHG | WEIGHT: 284.39 LBS | HEART RATE: 70 BPM | HEIGHT: 67 IN

## 2023-10-02 LAB
2HR DELTA HS TROPONIN: 1 NG/L
ATRIAL RATE: 82 BPM
CARDIAC TROPONIN I PNL SERPL HS: 3 NG/L
CARDIAC TROPONIN I PNL SERPL HS: 4 NG/L
P AXIS: 9 DEGREES
PR INTERVAL: 212 MS
QRS AXIS: -7 DEGREES
QRSD INTERVAL: 88 MS
QT INTERVAL: 378 MS
QTC INTERVAL: 441 MS
T WAVE AXIS: 24 DEGREES
VENTRICULAR RATE: 82 BPM

## 2023-10-02 PROCEDURE — 36415 COLL VENOUS BLD VENIPUNCTURE: CPT | Performed by: EMERGENCY MEDICINE

## 2023-10-02 PROCEDURE — 84484 ASSAY OF TROPONIN QUANT: CPT | Performed by: EMERGENCY MEDICINE

## 2023-10-30 ENCOUNTER — OFFICE VISIT (OUTPATIENT)
Dept: FAMILY MEDICINE CLINIC | Facility: CLINIC | Age: 68
End: 2023-10-30
Payer: COMMERCIAL

## 2023-10-30 VITALS
SYSTOLIC BLOOD PRESSURE: 118 MMHG | TEMPERATURE: 98.3 F | DIASTOLIC BLOOD PRESSURE: 82 MMHG | HEART RATE: 92 BPM | OXYGEN SATURATION: 99 % | BODY MASS INDEX: 42.82 KG/M2 | RESPIRATION RATE: 18 BRPM | WEIGHT: 273.4 LBS

## 2023-10-30 DIAGNOSIS — R73.9 HYPERGLYCEMIA: ICD-10-CM

## 2023-10-30 DIAGNOSIS — J39.8 DEVIATED TRACHEA: ICD-10-CM

## 2023-10-30 DIAGNOSIS — I10 ESSENTIAL HYPERTENSION: ICD-10-CM

## 2023-10-30 DIAGNOSIS — E01.0 THYROMEGALY: Primary | ICD-10-CM

## 2023-10-30 DIAGNOSIS — K11.6 CYST OF LEFT PAROTID GLAND: ICD-10-CM

## 2023-10-30 DIAGNOSIS — K21.00 GASTROESOPHAGEAL REFLUX DISEASE WITH ESOPHAGITIS, UNSPECIFIED WHETHER HEMORRHAGE: ICD-10-CM

## 2023-10-30 DIAGNOSIS — E03.9 ACQUIRED HYPOTHYROIDISM: ICD-10-CM

## 2023-10-30 DIAGNOSIS — E78.49 OTHER HYPERLIPIDEMIA: ICD-10-CM

## 2023-10-30 DIAGNOSIS — M17.12 PRIMARY OSTEOARTHRITIS OF LEFT KNEE: ICD-10-CM

## 2023-10-30 DIAGNOSIS — E53.8 VITAMIN B12 DEFICIENCY: ICD-10-CM

## 2023-10-30 DIAGNOSIS — E55.9 VITAMIN D DEFICIENCY: ICD-10-CM

## 2023-10-30 DIAGNOSIS — Z12.83 SKIN CANCER SCREENING: ICD-10-CM

## 2023-10-30 DIAGNOSIS — H65.00 ACUTE SEROUS OTITIS MEDIA, RECURRENCE NOT SPECIFIED, UNSPECIFIED LATERALITY: ICD-10-CM

## 2023-10-30 DIAGNOSIS — D50.8 OTHER IRON DEFICIENCY ANEMIA: ICD-10-CM

## 2023-10-30 DIAGNOSIS — J01.00 ACUTE MAXILLARY SINUSITIS, RECURRENCE NOT SPECIFIED: ICD-10-CM

## 2023-10-30 PROCEDURE — 99214 OFFICE O/P EST MOD 30 MIN: CPT | Performed by: NURSE PRACTITIONER

## 2023-10-30 PROCEDURE — 1159F MED LIST DOCD IN RCRD: CPT | Performed by: NURSE PRACTITIONER

## 2023-10-30 PROCEDURE — 1160F RVW MEDS BY RX/DR IN RCRD: CPT | Performed by: NURSE PRACTITIONER

## 2023-10-30 RX ORDER — METOPROLOL SUCCINATE 25 MG/1
25 TABLET, EXTENDED RELEASE ORAL DAILY
COMMUNITY

## 2023-10-30 RX ORDER — AZITHROMYCIN 250 MG/1
TABLET, FILM COATED ORAL
Qty: 6 TABLET | Refills: 2 | Status: SHIPPED | OUTPATIENT
Start: 2023-10-30 | End: 2023-11-04

## 2023-10-30 NOTE — PROGRESS NOTES
Name: Marlene Churchill      : 1955      MRN: [id number]  Encounter Provider: KELLIE Ny  Encounter Date: 10/30/2023   Encounter department: [address] [place] [address]     1. Thyromegaly  -     US head neck soft tissue; Future; Expected date: 10/30/2023  -     T3, free; Future; Expected date: 2023  -     T4, free; Future; Expected date: 2023  -     Ambulatory Referral to Otolaryngology; Future    2. Essential hypertension    3. Acquired hypothyroidism  -     TSH, 3rd generation; Future; Expected date: 2023  -     T3, free; Future; Expected date: 2023  -     T4, free; Future; Expected date: 2023    4. Gastroesophageal reflux disease with esophagitis, unspecified whether hemorrhage    5. Primary osteoarthritis of left knee    6. Vitamin D deficiency  -     Vitamin D 25 hydroxy; Future; Expected date: 2023    7. Vitamin B12 deficiency  -     Vitamin B12; Future; Expected date: 2023    8. Cyst of left parotid gland  -     US head neck soft tissue; Future; Expected date: 10/30/2023  -     Ambulatory Referral to Otolaryngology; Future    9. Other iron deficiency anemia  -     CBC and differential; Future; Expected date: 2023    10. Hyperglycemia  -     Comprehensive metabolic panel; Future; Expected date: 2023  -     Hemoglobin A1C; Future; Expected date: 2023  -     Insulin, fasting; Future; Expected date: 2023    11. Other hyperlipidemia  -     Lipid panel; Future; Expected date: 2023    12. Acute maxillary sinusitis, recurrence not specified  -     azithromycin (Zithromax) 250 mg tablet; Take 2 tablets (500 mg total) by mouth daily for 1 day, THEN 1 tablet (250 mg total) daily for 4 days. 13. Acute serous otitis media, recurrence not specified, unspecified laterality  -     azithromycin (Zithromax) 250 mg tablet;  Take 2 tablets (500 mg total) by mouth daily for 1 day, THEN 1 tablet (250 mg total) daily for 4 days. 14. Deviated trachea  -     Ambulatory Referral to Otolaryngology; Future           Subjective     Patient presents to office for follow up and recheck. Complete medical history and medications reviewed with patient and tolerating all medications well without any problems. C/O productive cough for white/clear mucous ongoing for the past month. Patient thought it was due to her recent cardiac medication change and states she called her Cardiologist to report the cough being a possible side effect but Cardiology told her it was not a side effect of the medication. Denies chest tightness or wheezing. Does hear chest congestion when breathing out. Denies fever, chills, body aches, PND. Sore throat, sinus congestion, ear ache or any other symptoms. Currently being followed by [facility] Dr. Sasha Grayson. Patient feels she no longer needs to be seen by ENT Dr. Juan Miguel Palomino who wanted to remove a left parotid gland cyst but patient put this on hold and has not seen ENT. Patient feels the left parotid gland enlargement has remained stable. Continues to be followed by [facility] Dr. Brennen Hess yearly for chronic DVT of left leg who told patient she is not a candidate for surgery and is ok to D/C the ASA but to wear compression stockings. Vital signs reviewed and stable. Denies any other problems or concerns at the present time. Patient requesting Dermatology referral to Dr. Oswaldo Buchanan for routine skin screening. Review of Systems  GENERAL:  Feels well, denies any significant changes in weight without trying. SKIN:  Denies rashes, lesions, opened areas, wounds, change in moles or any other skin changes. HEENT:  Denies any head injury or headaches. Negative blurred vision, floaters, spots before eyes, infections, or other vision problems. Negative significant changes in vision or hearing. Negative tinnitus, vertigo, or infections.   Negative hay fever, sinus trouble, nasal discharge, bloody noses, or problems with smell. Negative sore throat, bleeding gums, ulcers, or sores. NECK:  Denies goiter, pain, swollen glands, or lymphadenopathy. Continues with left parotid gland cyst.    BREASTS:  Denies lumps, pain, nipple discharge, swelling, redness, or any other changes. RESPIRATORY:  C/O productive cough for white mucous ongoing for the past month and hears chest congestion. Denies wheezing, shortness of breath, dyspnea, or orthopnea. CARDIOVASCULAR:  Denies chest pain or palpitations. GASTROINTESTINAL:  Appetite good, denies nausea, vomiting, or indigestion. Bowel movements normal occurring about once daily or every other day. URINARY:  Denies frequency, urgency, incontinence, dysuria, hematuria, nocturia, or recent flank pain. GENITAL:  Denies vaginal discharge, pelvic infection, lesions, ulcers, or pain. Negative dyspareunia or abnormal vaginal bleeding. PERIPHERAL VASCULAR:  Denies varicosities, swelling, skin changes, or pain. MUSCULOSKELETAL:  Denies back, joint, or muscle pain. Negative problems with mobility or movement. PSYCHIATRIC:  Denies problems with depression, anxiety, anger, or other psychiatric symptoms. NEUROLOGIC:  Denies fainting, dizziness, memory problems, seizures, tingling, motor or sensory loss. HEMATOLOGIC:  Denies easy bruising, bleeding, or anemia. ENDOCRINE:  Denies thyroid problems, temperature intolerance, excessive sweating, or other endocrine symptoms.     Past Medical History:   Diagnosis Date    Allergic rhinitis     Carpal tunnel syndrome on both sides     Cervical disc herniation     DVT (deep venous thrombosis) (HCC)     DVT Left Leg    Fracture of foot     left casted    History of mammogram 2017    Being followed by Dr. Red Schlatter    History of Papanicolaou smear of cervix 2016    Dr. Red Schlatter    Hx of colonoscopy 2016    Dr. Simmons Cost    Hyperlipemia     Hypertension     Hypothyroidism     Lichen sclerosus     Renal calculi     Rotator cuff tear, left      Past Surgical History:   Procedure Laterality Date    CERVICAL LAMINECTOMY N/A     CHOLECYSTECTOMY N/A     KNEE CARTILAGE SURGERY Bilateral     LITHOTRIPSY N/A     NEUROPLASTY / TRANSPOSITION MEDIAN NERVE AT CARPAL TUNNEL BILATERAL Bilateral     ROTATOR CUFF REPAIR Left     TONSILLECTOMY AND ADENOIDECTOMY Bilateral     TONSILLECTOMY AND ADENOIDECTOMY      TUBAL LIGATION N/A     US GUIDED THYROID BIOPSY  11/2018    WRIST FRACTURE SURGERY Right     plates and pins      Family History   Problem Relation Age of Onset    Hypertension Mother     Hyperlipidemia Mother     Hypothyroidism Mother     Brain cancer Mother     Hypertension Father     Celiac disease Father     Ovarian cancer Sister     Celiac disease Brother     Hypothyroidism Brother     Diabetes Paternal Grandmother     Hypothyroidism Sister     Coronary artery disease Brother     Hypertension Brother     Hyperlipidemia Brother     Diabetes Sister      Social History     Socioeconomic History    Marital status: /Civil Union     Spouse name: None    Number of children: None    Years of education: None    Highest education level: None   Occupational History    None   Tobacco Use    Smoking status: Every Day     Packs/day: 0.50     Years: 44.00     Total pack years: 22.00     Types: Cigarettes    Smokeless tobacco: Never    Tobacco comments:     8/25/23-smoke 5 cigarettes daily.     Vaping Use    Vaping Use: Never used   Substance and Sexual Activity    Alcohol use: No    Drug use: No    Sexual activity: Not Currently   Other Topics Concern    None   Social History Narrative    None     Social Determinants of Health     Financial Resource Strain: Not on file   Food Insecurity: Not on file   Transportation Needs: Not on file   Physical Activity: Not on file   Stress: Not on file   Social Connections: Not on file   Intimate Partner Violence: Not on file   Housing Stability: Not on file     Current Outpatient Medications on File Prior to Visit Medication Sig    acetaminophen (TYLENOL) 325 mg tablet Take 2 tablets (650 mg total) by mouth every 6 (six) hours as needed for mild pain, headaches or fever    ascorbic acid (VITAMIN C) 250 mg tablet Take 500 mg by mouth daily    cholecalciferol (VITAMIN D3) 1,000 units tablet Take 2 tablets (2,000 Units total) by mouth daily    fluticasone (FLONASE) 50 mcg/act nasal spray 1 spray each nostril 2 x daily prn congestion    levothyroxine 150 mcg tablet TAKE 1 TABLET BY MOUTH ONCE DAILY IN  THE  EARLY  MORNING    loratadine (CLARITIN) 10 mg tablet 1 tab po daily x 14 days then prn allergies    vitamin B-12 (VITAMIN B-12) 1,000 mcg tablet Take 1 tablet (1,000 mcg total) by mouth 3 (three) times a week    Zinc 25 MG TABS Take 1 tablet by mouth daily    [DISCONTINUED] magnesium oxide (MAG-OX) 400 mg Take 1 tablet (400 mg total) by mouth daily at bedtime    metoprolol succinate (TOPROL-XL) 25 mg 24 hr tablet Take 25 mg by mouth daily    [DISCONTINUED] aspirin (ECOTRIN LOW STRENGTH) 81 mg EC tablet Take 1 tablet (81 mg total) by mouth daily (Patient not taking: Reported on 8/25/2023)     Allergies   Allergen Reactions    Atorvastatin Myalgia     Immunization History   Administered Date(s) Administered    INFLUENZA 08/25/2017, 09/19/2018, 10/27/2020    Influenza, injectable, quadrivalent, preservative free 0.5 mL 09/19/2018    Pneumococcal Conjugate 13-Valent 10/27/2020    Pneumococcal Polysaccharide PPV23 01/02/2020    Tdap 11/20/2017    Zoster 01/08/2017    Zoster Vaccine Recombinant 10/03/2018, 10/03/2019       Objective     /82   Pulse 92   Temp 98.3 °F (36.8 °C) (Tympanic)   Resp 18   Wt 124 kg (273 lb 6.4 oz)   LMP 01/01/2004   SpO2 99%   BMI 42.82 kg/m²     Physical Exam  Vitals and nursing note reviewed. GENERAL:  Appears well nourished, well groomed, in no acute distress. SKIN:  Palms warm, dry, color good. Nails without clubbing or cyanosis. No lesions, ulcerations, or wounds.   HEAD:  Hair is average texture. Scalp without lesions, normocephalic, and atraumatic. EYES:  Visual fields full by confrontation. Conjunctiva pink, sclera white, PERRLA. EARS:  B/L ear canals clear. B/L TMs red with serous effusion and decreased light reflex. NOSE: Mucosa pink, moist, septum midline. + sinus tenderness. B/L turbinates red and edematous with yellow exudate. MOUTH:  Oral mucosa pink. Pharynx pink, moist, without swelling, redness, or exudate. Dentition ok. Tongue midline. NECK:  Supple, trachea midline, + thyromegaly, no lymphadenopathy, or swollen glands. + enlargement of left parotid gland. LYMPH NODES:  Negative enlargement of neck, axillary, epitrochlear, or inguinal nodes. THORAX/LUNGS  Thorax symmetric with good excursion. Lungs resonant. Breath sounds vesicular with no added sounds. Diaphragm descends within normal limits. CARDIOVASCULAR:  Carotid upstrokes brisk and without bruits. Apical impulse discrete and tapping, barely palpable in the 5th ICS/MCL. Normal S1 and Normal S2, Negative S3 or S4. Negative murmurs, thrills, lifts, or heaves. ABDOMEN:  Protuberant, bowel sounds normal active x 4 quadrants. Negative tenderness. Negative masses. Negative hepatomegaly. Negative splenomegaly. Negative costovertebral tenderness. EXTREMITIES:  Warm, calves supple, non-tender, negative for edema. Negative stasis pigmentation or ulcers. +2 pulses throughout. MUSCULOSKELETAL:  Negative joint deformities. Good range of motion in hands, wrists, elbows, shoulders, spine, hips, knees, and ankles. NEUROLOGICAL:  Mental status:  Awake, alert, and oriented to person, place, time, and event. Normal thought processes.           KELLIE Garrison

## 2023-10-30 NOTE — PATIENT INSTRUCTIONS
Wellness Visit for Adults   WHAT YOU NEED TO KNOW:   What is a wellness visit? A wellness visit is when you see your healthcare provider to get screened for health problems. Your healthcare provider will also give you advice on how to stay healthy. Write down your questions so you remember to ask them. Ask your healthcare provider how often you should have a wellness visit. What happens at a wellness visit? Your healthcare provider will ask about your health, and your family history of health problems. This includes high blood pressure, heart disease, and cancer. He or she will ask if you have symptoms that concern you, if you smoke, and about your mood. You may also be asked about your intake of medicines, supplements, food, and alcohol. Any of the following may be done: Your weight  will be checked. Your height may also be checked so your body mass index (BMI) can be calculated. Your BMI shows if you are at a healthy weight. Your blood pressure  and heart rate will be checked. Your temperature may also be checked. Blood and urine tests  may be done. Blood tests may be done to check your cholesterol levels. Abnormal cholesterol levels increase your risk for heart disease and stroke. You may also need a blood or urine test to check for diabetes if you are at increased risk. Urine tests may be done to look for signs of an infection or kidney disease. A physical exam  includes checking your heartbeat and lungs with a stethoscope. Your healthcare provider may also check your skin to look for sun damage. Screening tests  may be recommended. A screening test is done to check for diseases that may not cause symptoms. The screening tests you may need depend on your age, gender, family history, and lifestyle habits. For example, colorectal screening may be recommended if you are 48years old or older. What screening tests do I need if I am a woman? A Pap smear  is used to screen for cervical cancer.  Pap smears are usually done every 3 to 5 years depending on your age. You may need them more often if you have had abnormal Pap smear test results in the past. Ask your healthcare provider how often you should have a Pap smear. A mammogram  is an x-ray of your breasts to screen for breast cancer. Experts recommend mammograms every 2 years starting at age 48 years. You may need a mammogram at age 52 years or younger if you have an increased risk for breast cancer. Talk to your healthcare provider about when you should start having mammograms and how often you need them. What vaccines might I need? Get an influenza vaccine  every year. The influenza vaccine protects you from the flu. Several types of viruses cause the flu. The viruses change over time, so new vaccines are made each year. Get a tetanus-diphtheria (Td) booster vaccine  every 10 years. This vaccine protects you against tetanus and diphtheria. Tetanus is a severe infection that may cause painful muscle spasms and lockjaw. Diphtheria is a severe bacterial infection that causes a thick covering in the back of your mouth and throat. Get a human papillomavirus (HPV) vaccine  if you are female and aged 23 to 32 or male 23 to 24 and never received it. This vaccine protects you from HPV infection. HPV is the most common infection spread by sexual contact. HPV may also cause vaginal, penile, and anal cancers. Get a pneumococcal vaccine  if you are aged 72 years or older. The pneumococcal vaccine is an injection given to protect you from pneumococcal disease. Pneumococcal disease is an infection caused by pneumococcal bacteria. The infection may cause pneumonia, meningitis, or an ear infection. Get a shingles vaccine  if you are 60 or older, even if you have had shingles before. The shingles vaccine is an injection to protect you from the varicella-zoster virus. This is the same virus that causes chickenpox.  Shingles is a painful rash that develops in people who had chickenpox or have been exposed to the virus. How can I eat healthy? My Plate is a model for planning healthy meals. It shows the types and amounts of foods that should go on your plate. Fruits and vegetables make up about half of your plate, and grains and protein make up the other half. A serving of dairy is included on the side of your plate. The amount of calories and serving sizes you need depends on your age, gender, weight, and height. Examples of healthy foods are listed below:  Eat a variety of vegetables  such as dark green, red, and orange vegetables. You can also include canned vegetables low in sodium (salt) and frozen vegetables without added butter or sauces. Eat a variety of fresh fruits , canned fruit in 100% juice, frozen fruit, and dried fruit. Include whole grains. At least half of the grains you eat should be whole grains. Examples include whole-wheat bread, wheat pasta, brown rice, and whole-grain cereals such as oatmeal.    Eat a variety of protein foods such as seafood (fish and shellfish), lean meat, and poultry without skin (turkey and chicken). Examples of lean meats include pork leg, shoulder, or tenderloin, and beef round, sirloin, tenderloin, and extra lean ground beef. Other protein foods include eggs and egg substitutes, beans, peas, soy products, nuts, and seeds. Choose low-fat dairy products such as skim or 1% milk or low-fat yogurt, cheese, and cottage cheese. Limit unhealthy fats  such as butter, hard margarine, and shortening. How much exercise do I need? Exercise at least 30 minutes per day on most days of the week. Some examples of exercise include walking, biking, dancing, and swimming. You can also fit in more physical activity by taking the stairs instead of the elevator or parking farther away from stores. Include muscle strengthening activities 2 days each week. Regular exercise provides many health benefits.  It helps you manage your weight, and decreases your risk for type 2 diabetes, heart disease, stroke, and high blood pressure. Exercise can also help improve your mood. Ask your healthcare provider about the best exercise plan for you. What are some general health and safety guidelines I should follow? Do not smoke. Nicotine and other chemicals in cigarettes and cigars can cause lung damage. Ask your healthcare provider for information if you currently smoke and need help to quit. E-cigarettes or smokeless tobacco still contain nicotine. Talk to your healthcare provider before you use these products. Limit alcohol. A drink of alcohol is 12 ounces of beer, 5 ounces of wine, or 1½ ounces of liquor. Lose weight, if needed. Being overweight increases your risk of certain health conditions. These include heart disease, high blood pressure, type 2 diabetes, and certain types of cancer. Protect your skin. Do not sunbathe or use tanning beds. Use sunscreen with a SPF 15 or higher. Apply sunscreen at least 15 minutes before you go outside. Reapply sunscreen every 2 hours. Wear protective clothing, hats, and sunglasses when you are outside. Drive safely. Always wear your seatbelt. Make sure everyone in your car wears a seatbelt. A seatbelt can save your life if you are in an accident. Do not use your cell phone when you are driving. This could distract you and cause an accident. Pull over if you need to make a call or send a text message. Practice safe sex. Use latex condoms if are sexually active and have more than one partner. Your healthcare provider may recommend screening tests for sexually transmitted infections (STIs). Wear helmets, lifejackets, and protective gear. Always wear a helmet when you ride a bike or motorcycle, go skiing, or play sports that could cause a head injury. Wear protective equipment when you play sports. Wear a lifejacket when you are on a boat or doing water sports.     CARE AGREEMENT: You have the right to help plan your care. Learn about your health condition and how it may be treated. Discuss treatment options with your healthcare providers to decide what care you want to receive. You always have the right to refuse treatment. The above information is an  only. It is not intended as medical advice for individual conditions or treatments. Talk to your doctor, nurse or pharmacist before following any medical regimen to see if it is safe and effective for you. © Copyright Annika Desouza 2023 Information is for End User's use only and may not be sold, redistributed or otherwise used for commercial purposes.

## 2023-10-31 ENCOUNTER — HOSPITAL ENCOUNTER (OUTPATIENT)
Dept: ULTRASOUND IMAGING | Facility: HOSPITAL | Age: 68
Discharge: HOME | End: 2023-10-31
Payer: COMMERCIAL

## 2023-10-31 DIAGNOSIS — K11.6 CYST OF LEFT PAROTID GLAND: ICD-10-CM

## 2023-10-31 DIAGNOSIS — E01.0 THYROMEGALY: ICD-10-CM

## 2023-10-31 PROCEDURE — 76536 US EXAM OF HEAD AND NECK: CPT

## 2023-11-09 ENCOUNTER — TELEPHONE (OUTPATIENT)
Dept: FAMILY MEDICINE CLINIC | Facility: CLINIC | Age: 68
End: 2023-11-09

## 2023-11-09 NOTE — TELEPHONE ENCOUNTER
----- Message from Bj Bang sent at 11/8/2023  2:12 PM EST -----  Please notify patient her US of Thyroid continues to show thyroiditis and left parotid lesion which Radiology is recommending continued follow up with ENT. Therefore, I would like her to follow up with her ENT for evaluation.

## 2023-12-01 ENCOUNTER — APPOINTMENT (OUTPATIENT)
Dept: LAB | Facility: CLINIC | Age: 68
End: 2023-12-01
Payer: COMMERCIAL

## 2023-12-01 ENCOUNTER — OFFICE VISIT (OUTPATIENT)
Dept: URGENT CARE | Facility: CLINIC | Age: 68
End: 2023-12-01
Payer: COMMERCIAL

## 2023-12-01 VITALS
TEMPERATURE: 97.9 F | DIASTOLIC BLOOD PRESSURE: 78 MMHG | OXYGEN SATURATION: 100 % | WEIGHT: 275 LBS | SYSTOLIC BLOOD PRESSURE: 116 MMHG | BODY MASS INDEX: 43.16 KG/M2 | HEART RATE: 71 BPM | HEIGHT: 67 IN | RESPIRATION RATE: 20 BRPM

## 2023-12-01 DIAGNOSIS — E78.49 OTHER HYPERLIPIDEMIA: ICD-10-CM

## 2023-12-01 DIAGNOSIS — D50.8 OTHER IRON DEFICIENCY ANEMIA: ICD-10-CM

## 2023-12-01 DIAGNOSIS — E01.0 THYROMEGALY: ICD-10-CM

## 2023-12-01 DIAGNOSIS — E55.9 VITAMIN D DEFICIENCY: ICD-10-CM

## 2023-12-01 DIAGNOSIS — R30.0 DYSURIA: Primary | ICD-10-CM

## 2023-12-01 DIAGNOSIS — E53.8 VITAMIN B12 DEFICIENCY: ICD-10-CM

## 2023-12-01 DIAGNOSIS — E03.9 ACQUIRED HYPOTHYROIDISM: ICD-10-CM

## 2023-12-01 DIAGNOSIS — R73.9 HYPERGLYCEMIA: ICD-10-CM

## 2023-12-01 LAB
25(OH)D3 SERPL-MCNC: 40.5 NG/ML (ref 30–100)
ALBUMIN SERPL BCP-MCNC: 4.4 G/DL (ref 3.5–5)
ALP SERPL-CCNC: 85 U/L (ref 34–104)
ALT SERPL W P-5'-P-CCNC: 19 U/L (ref 7–52)
ANION GAP SERPL CALCULATED.3IONS-SCNC: 7 MMOL/L
AST SERPL W P-5'-P-CCNC: 21 U/L (ref 13–39)
BASOPHILS # BLD AUTO: 0.1 THOUSANDS/ÂΜL (ref 0–0.1)
BASOPHILS NFR BLD AUTO: 1 % (ref 0–1)
BILIRUB SERPL-MCNC: 0.63 MG/DL (ref 0.2–1)
BUN SERPL-MCNC: 16 MG/DL (ref 5–25)
CALCIUM SERPL-MCNC: 10 MG/DL (ref 8.4–10.2)
CHLORIDE SERPL-SCNC: 103 MMOL/L (ref 96–108)
CHOLEST SERPL-MCNC: 316 MG/DL
CO2 SERPL-SCNC: 30 MMOL/L (ref 21–32)
CREAT SERPL-MCNC: 0.78 MG/DL (ref 0.6–1.3)
EOSINOPHIL # BLD AUTO: 0.25 THOUSAND/ÂΜL (ref 0–0.61)
EOSINOPHIL NFR BLD AUTO: 3 % (ref 0–6)
ERYTHROCYTE [DISTWIDTH] IN BLOOD BY AUTOMATED COUNT: 14.6 % (ref 11.6–15.1)
EST. AVERAGE GLUCOSE BLD GHB EST-MCNC: 114 MG/DL
GFR SERPL CREATININE-BSD FRML MDRD: 77 ML/MIN/1.73SQ M
GLUCOSE P FAST SERPL-MCNC: 87 MG/DL (ref 65–99)
HBA1C MFR BLD: 5.6 %
HCT VFR BLD AUTO: 49 % (ref 34.8–46.1)
HDLC SERPL-MCNC: 51 MG/DL
HGB BLD-MCNC: 15.4 G/DL (ref 11.5–15.4)
IMM GRANULOCYTES # BLD AUTO: 0.02 THOUSAND/UL (ref 0–0.2)
IMM GRANULOCYTES NFR BLD AUTO: 0 % (ref 0–2)
INSULIN SERPL-ACNC: 14.89 UIU/ML (ref 1.9–23)
LDLC SERPL CALC-MCNC: 230 MG/DL (ref 0–100)
LYMPHOCYTES # BLD AUTO: 2.74 THOUSANDS/ÂΜL (ref 0.6–4.47)
LYMPHOCYTES NFR BLD AUTO: 32 % (ref 14–44)
MCH RBC QN AUTO: 31 PG (ref 26.8–34.3)
MCHC RBC AUTO-ENTMCNC: 31.4 G/DL (ref 31.4–37.4)
MCV RBC AUTO: 99 FL (ref 82–98)
MONOCYTES # BLD AUTO: 0.58 THOUSAND/ÂΜL (ref 0.17–1.22)
MONOCYTES NFR BLD AUTO: 7 % (ref 4–12)
NEUTROPHILS # BLD AUTO: 4.97 THOUSANDS/ÂΜL (ref 1.85–7.62)
NEUTS SEG NFR BLD AUTO: 57 % (ref 43–75)
NONHDLC SERPL-MCNC: 265 MG/DL
NRBC BLD AUTO-RTO: 0 /100 WBCS
PLATELET # BLD AUTO: 198 THOUSANDS/UL (ref 149–390)
PMV BLD AUTO: 10.8 FL (ref 8.9–12.7)
POTASSIUM SERPL-SCNC: 4.6 MMOL/L (ref 3.5–5.3)
PROT SERPL-MCNC: 7.7 G/DL (ref 6.4–8.4)
RBC # BLD AUTO: 4.97 MILLION/UL (ref 3.81–5.12)
SL AMB  POCT GLUCOSE, UA: ABNORMAL
SL AMB LEUKOCYTE ESTERASE,UA: ABNORMAL
SL AMB POCT BILIRUBIN,UA: ABNORMAL
SL AMB POCT BLOOD,UA: ABNORMAL
SL AMB POCT CLARITY,UA: CLEAR
SL AMB POCT COLOR,UA: YELLOW
SL AMB POCT KETONES,UA: ABNORMAL
SL AMB POCT NITRITE,UA: ABNORMAL
SL AMB POCT PH,UA: 5
SL AMB POCT SPECIFIC GRAVITY,UA: 1.01
SL AMB POCT URINE PROTEIN: ABNORMAL
SL AMB POCT UROBILINOGEN: 0.2
SODIUM SERPL-SCNC: 140 MMOL/L (ref 135–147)
T3FREE SERPL-MCNC: 3.54 PG/ML (ref 2.5–3.9)
T4 FREE SERPL-MCNC: 0.7 NG/DL (ref 0.61–1.12)
TRIGL SERPL-MCNC: 177 MG/DL
TSH SERPL DL<=0.05 MIU/L-ACNC: 4 UIU/ML (ref 0.45–4.5)
VIT B12 SERPL-MCNC: 412 PG/ML (ref 180–914)
WBC # BLD AUTO: 8.66 THOUSAND/UL (ref 4.31–10.16)

## 2023-12-01 PROCEDURE — 36415 COLL VENOUS BLD VENIPUNCTURE: CPT

## 2023-12-01 PROCEDURE — 81002 URINALYSIS NONAUTO W/O SCOPE: CPT | Performed by: PHYSICIAN ASSISTANT

## 2023-12-01 PROCEDURE — 87186 SC STD MICRODIL/AGAR DIL: CPT | Performed by: PHYSICIAN ASSISTANT

## 2023-12-01 PROCEDURE — 87077 CULTURE AEROBIC IDENTIFY: CPT | Performed by: PHYSICIAN ASSISTANT

## 2023-12-01 PROCEDURE — 83036 HEMOGLOBIN GLYCOSYLATED A1C: CPT

## 2023-12-01 PROCEDURE — 99213 OFFICE O/P EST LOW 20 MIN: CPT | Performed by: PHYSICIAN ASSISTANT

## 2023-12-01 PROCEDURE — 87086 URINE CULTURE/COLONY COUNT: CPT | Performed by: PHYSICIAN ASSISTANT

## 2023-12-01 PROCEDURE — 80061 LIPID PANEL: CPT

## 2023-12-01 PROCEDURE — 85025 COMPLETE CBC W/AUTO DIFF WBC: CPT

## 2023-12-01 PROCEDURE — 83525 ASSAY OF INSULIN: CPT

## 2023-12-01 PROCEDURE — 84443 ASSAY THYROID STIM HORMONE: CPT

## 2023-12-01 PROCEDURE — 82607 VITAMIN B-12: CPT

## 2023-12-01 PROCEDURE — 80053 COMPREHEN METABOLIC PANEL: CPT

## 2023-12-01 PROCEDURE — 82306 VITAMIN D 25 HYDROXY: CPT

## 2023-12-01 PROCEDURE — 84481 FREE ASSAY (FT-3): CPT

## 2023-12-01 PROCEDURE — 84439 ASSAY OF FREE THYROXINE: CPT

## 2023-12-01 RX ORDER — AMOXICILLIN AND CLAVULANATE POTASSIUM 500; 125 MG/1; MG/1
1 TABLET, FILM COATED ORAL EVERY 12 HOURS SCHEDULED
Qty: 14 TABLET | Refills: 0 | Status: SHIPPED | OUTPATIENT
Start: 2023-12-01 | End: 2023-12-08

## 2023-12-01 NOTE — PATIENT INSTRUCTIONS
Dysuria   WHAT YOU NEED TO KNOW:   Dysuria is difficulty urinating, or pain, burning, or discomfort with urination. Dysuria is usually a symptom of another problem. DISCHARGE INSTRUCTIONS:   Return to the emergency department if:   You have severe back, side, or abdominal pain. You have fever and shaking chills. You vomit several times in a row. Contact your healthcare provider if:   Your symptoms do not go away, even after treatment. You have questions or concerns about your condition or care. Medicines:   Medicines  may be given to help treat a bacterial infection or help decrease bladder spasms. Take your medicine as directed. Contact your healthcare provider if you think your medicine is not helping or if you have side effects. Tell your provider if you are allergic to any medicine. Keep a list of the medicines, vitamins, and herbs you take. Include the amounts, and when and why you take them. Bring the list or the pill bottles to follow-up visits. Carry your medicine list with you in case of an emergency. Follow up with your healthcare provider as directed: Your healthcare provider may also refer you to a urologist or nephrologist to have additional testing. Write down your questions so you remember to ask them during your visits. Manage your dysuria:   Drink more liquids. Liquids help flush out bacteria that may be causing an infection. Ask your healthcare provider how much liquid to drink each day and which liquids are best for you. Take sitz baths as directed. Fill a bathtub with 4 to 6 inches of warm water. You may also use a sitz bath pan that fits over a toilet. Sit in the sitz bath for 20 minutes. Do this 2 to 3 times a day, or as directed. The warm water can help decrease pain and swelling. © Copyright Juliannea Gosselin 2023 Information is for End User's use only and may not be sold, redistributed or otherwise used for commercial purposes.   The above information is an  only. It is not intended as medical advice for individual conditions or treatments. Talk to your doctor, nurse or pharmacist before following any medical regimen to see if it is safe and effective for you.

## 2023-12-01 NOTE — PROGRESS NOTES
[facility]        NAME: Cami Huizar is a 70 y.o. female  : 1955    MRN: [id number]  DATE: 2023  TIME: 9:34 AM    Assessment and Plan   Dysuria [R30.0]  1. Dysuria  POCT urine dip    Urine culture    amoxicillin-clavulanate (Augmentin) 500-125 mg per tablet            Patient Instructions     Patient Instructions   Dysuria   WHAT YOU NEED TO KNOW:   Dysuria is difficulty urinating, or pain, burning, or discomfort with urination. Dysuria is usually a symptom of another problem. DISCHARGE INSTRUCTIONS:   Return to the emergency department if:   You have severe back, side, or abdominal pain. You have fever and shaking chills. You vomit several times in a row. Contact your healthcare provider if:   Your symptoms do not go away, even after treatment. You have questions or concerns about your condition or care. Medicines:   Medicines  may be given to help treat a bacterial infection or help decrease bladder spasms. Take your medicine as directed. Contact your healthcare provider if you think your medicine is not helping or if you have side effects. Tell your provider if you are allergic to any medicine. Keep a list of the medicines, vitamins, and herbs you take. Include the amounts, and when and why you take them. Bring the list or the pill bottles to follow-up visits. Carry your medicine list with you in case of an emergency. Follow up with your healthcare provider as directed: Your healthcare provider may also refer you to a urologist or nephrologist to have additional testing. Write down your questions so you remember to ask them during your visits. Manage your dysuria:   Drink more liquids. Liquids help flush out bacteria that may be causing an infection. Ask your healthcare provider how much liquid to drink each day and which liquids are best for you. Take sitz baths as directed. Fill a bathtub with 4 to 6 inches of warm water.  You may also use a sitz bath pan that fits over a toilet. Sit in the sitz bath for 20 minutes. Do this 2 to 3 times a day, or as directed. The warm water can help decrease pain and swelling. © Copyright Jim [employer] 2023 Information is for End User's use only and may not be sold, redistributed or otherwise used for commercial purposes. The above information is an  only. It is not intended as medical advice for individual conditions or treatments. Talk to your doctor, nurse or pharmacist before following any medical regimen to see if it is safe and effective for you. Follow up with PCP in 3-5 days. Proceed to  ER if symptoms worsen. Chief Complaint     Chief Complaint   Patient presents with    Possible UTI     Frequency and burning started a few days ago. Drinking cranberry juice. History of Present Illness       Patient presents to the clinic for urinary frequency and dysuria for the past few days. She denies associated fevers or chills. Review of Systems   Review of Systems   Constitutional:  Negative for chills and fever. HENT:  Negative for ear pain and sore throat. Eyes:  Negative for pain and visual disturbance. Respiratory:  Negative for cough and shortness of breath. Cardiovascular:  Negative for chest pain and palpitations. Gastrointestinal:  Negative for abdominal pain, nausea and vomiting. Genitourinary:  Positive for dysuria and frequency. Musculoskeletal:  Negative for arthralgias and back pain. Skin:  Negative for color change and rash. Neurological:  Negative for seizures and syncope. All other systems reviewed and are negative.         Current Medications       Current Outpatient Medications:     acetaminophen (TYLENOL) 325 mg tablet, Take 2 tablets (650 mg total) by mouth every 6 (six) hours as needed for mild pain, headaches or fever, Disp: , Rfl: 0    amoxicillin-clavulanate (Augmentin) 500-125 mg per tablet, Take 1 tablet by mouth every 12 (twelve) hours for 7 days, Disp: 14 tablet, Rfl: 0    ascorbic acid (VITAMIN C) 250 mg tablet, Take 500 mg by mouth daily, Disp: , Rfl:     cholecalciferol (VITAMIN D3) 1,000 units tablet, Take 2 tablets (2,000 Units total) by mouth daily, Disp: 90 tablet, Rfl: 1    fluticasone (FLONASE) 50 mcg/act nasal spray, 1 spray each nostril 2 x daily prn congestion, Disp: 18.2 mL, Rfl: 5    levothyroxine 150 mcg tablet, TAKE 1 TABLET BY MOUTH ONCE DAILY IN  THE  EARLY  MORNING, Disp: 90 tablet, Rfl: 1    loratadine (CLARITIN) 10 mg tablet, 1 tab po daily x 14 days then prn allergies, Disp: 30 tablet, Rfl: 5    metoprolol succinate (TOPROL-XL) 25 mg 24 hr tablet, Take 25 mg by mouth daily, Disp: , Rfl:     vitamin B-12 (VITAMIN B-12) 1,000 mcg tablet, Take 1 tablet (1,000 mcg total) by mouth 3 (three) times a week, Disp: 90 tablet, Rfl: 1    Zinc 25 MG TABS, Take 1 tablet by mouth daily, Disp: , Rfl:     Current Allergies     Allergies as of 12/01/2023 - Reviewed 12/01/2023   Allergen Reaction Noted    Atorvastatin Myalgia 07/05/2023            The following portions of the patient's history were reviewed and updated as appropriate: allergies, current medications, past family history, past medical history, past social history, past surgical history and problem list.     Past Medical History:   Diagnosis Date    Allergic rhinitis     Carpal tunnel syndrome on both sides     Cervical disc herniation     DVT (deep venous thrombosis) (HCC)     DVT Left Leg    Fracture of foot     left casted    History of mammogram 2017    Being followed by Dr. Gould    History of Papanicolaou smear of cervix 2016    Dr. Gould    Hx of colonoscopy 2016    Dr. Juila Treviño    Hyperlipemia     Hypertension     Hypothyroidism     Lichen sclerosus     Renal calculi     Rotator cuff tear, left        Past Surgical History:   Procedure Laterality Date    CERVICAL LAMINECTOMY N/A     CHOLECYSTECTOMY N/A     KNEE CARTILAGE SURGERY Bilateral     LITHOTRIPSY N/A NEUROPLASTY / TRANSPOSITION MEDIAN NERVE AT CARPAL TUNNEL BILATERAL Bilateral     ROTATOR CUFF REPAIR Left     TONSILLECTOMY AND ADENOIDECTOMY Bilateral     TONSILLECTOMY AND ADENOIDECTOMY      TUBAL LIGATION N/A     US GUIDED THYROID BIOPSY  11/2018    WRIST FRACTURE SURGERY Right     plates and pins        Family History   Problem Relation Age of Onset    Hypertension Mother     Hyperlipidemia Mother     Hypothyroidism Mother     Brain cancer Mother     Hypertension Father     Celiac disease Father     Ovarian cancer Sister     Celiac disease Brother     Hypothyroidism Brother     Diabetes Paternal Grandmother     Hypothyroidism Sister     Coronary artery disease Brother     Hypertension Brother     Hyperlipidemia Brother     Diabetes Sister          Medications have been verified. Objective   /78   Pulse 71   Temp 97.9 °F (36.6 °C)   Resp 20   Ht 5' 7" (1.702 m)   Wt 125 kg (275 lb)   LMP 01/01/2004   SpO2 100%   BMI 43.07 kg/m²        Physical Exam     Physical Exam  Constitutional:       Appearance: She is not ill-appearing or diaphoretic. Abdominal:      Tenderness: There is abdominal tenderness in the suprapubic area. There is no right CVA tenderness or left CVA tenderness.               -Urine dip indicates a UTI. I will treat with Augmentin until the culture is resulted.   She will follow-up with her PCP or go to ER if symptoms worsen

## 2023-12-03 LAB — BACTERIA UR CULT: ABNORMAL

## 2024-03-01 DIAGNOSIS — Z00.6 ENCOUNTER FOR EXAMINATION FOR NORMAL COMPARISON OR CONTROL IN CLINICAL RESEARCH PROGRAM: ICD-10-CM

## 2024-03-12 ENCOUNTER — TELEPHONE (OUTPATIENT)
Dept: FAMILY MEDICINE CLINIC | Facility: CLINIC | Age: 69
End: 2024-03-12

## 2024-03-12 NOTE — TELEPHONE ENCOUNTER
LVM asking pt to call medicare to make them aware of the other insurance they have and also if they do not have workman's comp to make medicare aware of that as well.

## 2024-03-12 NOTE — TELEPHONE ENCOUNTER
Left vm for patient to call the office to reschedule appt for 5/8 because Patricia will not be in the office that day.  May reschedule with Artie if they do not want to wait for García's first available appt

## 2024-03-13 ENCOUNTER — OFFICE VISIT (OUTPATIENT)
Dept: FAMILY MEDICINE CLINIC | Facility: CLINIC | Age: 69
End: 2024-03-13
Payer: COMMERCIAL

## 2024-03-13 VITALS
SYSTOLIC BLOOD PRESSURE: 142 MMHG | HEART RATE: 92 BPM | BODY MASS INDEX: 43.32 KG/M2 | RESPIRATION RATE: 16 BRPM | DIASTOLIC BLOOD PRESSURE: 84 MMHG | HEIGHT: 67 IN | TEMPERATURE: 97.7 F | OXYGEN SATURATION: 96 % | WEIGHT: 276 LBS

## 2024-03-13 DIAGNOSIS — D50.8 OTHER IRON DEFICIENCY ANEMIA: ICD-10-CM

## 2024-03-13 DIAGNOSIS — E78.00 PURE HYPERCHOLESTEROLEMIA: ICD-10-CM

## 2024-03-13 DIAGNOSIS — J30.89 SEASONAL ALLERGIC RHINITIS DUE TO OTHER ALLERGIC TRIGGER: ICD-10-CM

## 2024-03-13 DIAGNOSIS — M17.12 PRIMARY OSTEOARTHRITIS OF LEFT KNEE: ICD-10-CM

## 2024-03-13 DIAGNOSIS — E03.9 ACQUIRED HYPOTHYROIDISM: ICD-10-CM

## 2024-03-13 DIAGNOSIS — Z12.31 ENCOUNTER FOR SCREENING MAMMOGRAM FOR BREAST CANCER: ICD-10-CM

## 2024-03-13 DIAGNOSIS — E66.01 MORBID OBESITY WITH BMI OF 40.0-44.9, ADULT (HCC): ICD-10-CM

## 2024-03-13 DIAGNOSIS — I49.5 SICK SINUS SYNDROME (HCC): Primary | ICD-10-CM

## 2024-03-13 DIAGNOSIS — E55.9 VITAMIN D DEFICIENCY: ICD-10-CM

## 2024-03-13 DIAGNOSIS — Z12.4 SCREENING FOR CERVICAL CANCER: ICD-10-CM

## 2024-03-13 DIAGNOSIS — J30.2 SEASONAL ALLERGIC RHINITIS, UNSPECIFIED TRIGGER: ICD-10-CM

## 2024-03-13 DIAGNOSIS — E03.8 OTHER SPECIFIED HYPOTHYROIDISM: ICD-10-CM

## 2024-03-13 DIAGNOSIS — R91.1 LUNG NODULE: ICD-10-CM

## 2024-03-13 DIAGNOSIS — I10 ESSENTIAL HYPERTENSION: ICD-10-CM

## 2024-03-13 DIAGNOSIS — R21 RASH: ICD-10-CM

## 2024-03-13 DIAGNOSIS — K21.00 GASTROESOPHAGEAL REFLUX DISEASE WITH ESOPHAGITIS WITHOUT HEMORRHAGE: ICD-10-CM

## 2024-03-13 DIAGNOSIS — E53.8 VITAMIN B12 DEFICIENCY: ICD-10-CM

## 2024-03-13 DIAGNOSIS — R73.9 HYPERGLYCEMIA: ICD-10-CM

## 2024-03-13 PROCEDURE — 99214 OFFICE O/P EST MOD 30 MIN: CPT | Performed by: NURSE PRACTITIONER

## 2024-03-13 RX ORDER — LEVOTHYROXINE SODIUM 0.15 MG/1
150 TABLET ORAL DAILY
Qty: 90 TABLET | Refills: 1 | Status: SHIPPED | OUTPATIENT
Start: 2024-03-13

## 2024-03-13 RX ORDER — FLUTICASONE PROPIONATE 50 MCG
SPRAY, SUSPENSION (ML) NASAL
Qty: 18.2 ML | Refills: 3 | Status: SHIPPED | OUTPATIENT
Start: 2024-03-13

## 2024-03-13 RX ORDER — KETOCONAZOLE 20 MG/G
CREAM TOPICAL AS NEEDED
COMMUNITY
End: 2024-03-13 | Stop reason: SDUPTHER

## 2024-03-13 RX ORDER — LORATADINE 10 MG/1
TABLET ORAL
Qty: 30 TABLET | Refills: 5 | Status: SHIPPED | OUTPATIENT
Start: 2024-03-13

## 2024-03-13 RX ORDER — KETOCONAZOLE 20 MG/G
CREAM TOPICAL AS NEEDED
Qty: 60 G | Refills: 5 | Status: SHIPPED | OUTPATIENT
Start: 2024-03-13

## 2024-03-13 RX ORDER — LANOLIN ALCOHOL/MO/W.PET/CERES
1000 CREAM (GRAM) TOPICAL 3 TIMES WEEKLY
Qty: 90 TABLET | Refills: 1 | Status: SHIPPED | OUTPATIENT
Start: 2024-03-13

## 2024-03-13 RX ORDER — FLUTICASONE PROPIONATE 50 MCG
SPRAY, SUSPENSION (ML) NASAL
Qty: 18.2 ML | Refills: 3 | Status: SHIPPED | OUTPATIENT
Start: 2024-03-13 | End: 2024-03-13 | Stop reason: SDUPTHER

## 2024-03-13 NOTE — PROGRESS NOTES
ADULT ANNUAL PHYSICAL  [facility]    NAME: Natividad Reis  AGE: 69 y.o. SEX: female  : 1955     DATE: 3/13/2024     Assessment and Plan:     Problem List Items Addressed This Visit       Acquired hypothyroidism    Essential hypertension    Relevant Orders    CBC and differential    TSH, 3rd generation    Pure hypercholesterolemia    Relevant Orders    Lipid panel    Vitamin D deficiency    Relevant Orders    Vitamin D 25 hydroxy    Vitamin B12 deficiency    Relevant Orders    Vitamin B12    Gastroesophageal reflux disease with esophagitis    Morbid obesity with BMI of 40.0-44.9, adult (HCC)    Primary osteoarthritis of left knee    Sick sinus syndrome (HCC) - Primary    Lung nodule    Relevant Medications    fluticasone (FLONASE) 50 mcg/act nasal spray    loratadine (CLARITIN) 10 mg tablet     Other Visit Diagnoses       Screening for cervical cancer        Relevant Orders    Ambulatory referral to Obstetrics / Gynecology    Encounter for screening mammogram for breast cancer        Relevant Orders    Mammo screening bilateral w 3d & cad    Other iron deficiency anemia        Relevant Orders    CBC and differential    Hyperglycemia        Relevant Orders    Comprehensive metabolic panel    Hemoglobin A1C    Insulin, fasting    Other specified hypothyroidism        Relevant Orders    TSH, 3rd generation    Seasonal allergic rhinitis, unspecified trigger        Relevant Medications    fluticasone (FLONASE) 50 mcg/act nasal spray    Seasonal allergic rhinitis due to other allergic trigger        Relevant Medications    loratadine (CLARITIN) 10 mg tablet            Immunizations and preventive care screenings were discussed with patient today. Appropriate education was printed on patient's after visit summary.    Counseling:  Injury prevention: discussed safety/seat belts, safety helmets, smoke detectors, carbon dioxide detectors, and smoking near  bedding or upholstery.      Falls Plan of Care: balance, strength, and gait training instructions were provided. Medications that increase falls were reviewed. Assessed feet and footwear. Vitamin D supplementation was recommended. Home safety education provided.     Tobacco Cessation Counseling: Tobacco cessation counseling was provided. The patient is sincerely urged to quit consumption of tobacco. She is not ready to quit tobacco. Medication options and side effects of medication discussed. Patient refused medication.         Return in about 6 months (around 9/13/2024) for Recheck.     Chief Complaint:     Chief Complaint   Patient presents with    Follow-up     Feels like her ears are blocked      History of Present Illness:     Adult Annual Physical   Patient here for a comprehensive physical exam. The patient reports problems - B/L ears feeling blocked .  Vital signs reviewed and stable.  Lab results reviewed with patient.  Continues to be followed by Dr. [facility] for Hx Sick Sinus Syndrome and HTN.  Continues to be followed by Dr. Stern for routine gynecology exam. Continues to be followed by ENT Dr. Ovalle for monitoring of her  Left Parotid Gland Nodule.      Diet and Physical Activity  Diet/Nutrition: heart healthy (low sodium) diet.   Exercise: walking.      Depression Screening  PHQ-2/9 Depression Screening    Little interest or pleasure in doing things: 0 - not at all  Feeling down, depressed, or hopeless: 0 - not at all       General Health  Sleep: sleeps well.   Hearing: normal - bilateral.  Vision: goes for regular eye exams.   Dental: regular dental visits.       /GYN Health  Follows with gynecology? no   Patient is: postmenopausal  Last menstrual period: 51 Years Old  Contraceptive method: menopause.    Advanced Care Planning  Do you have an advanced directive? no  Do you have a durable medical power of ? no  ACP document given to the patient? no     Review of Systems:      Review of Systems  GENERAL:  Feels well, denies any significant changes in weight without trying.  SKIN:  Denies rashes, lesions, opened areas, wounds, change in moles or any other skin changes.  HEENT:  Denies any head injury or headaches.  Negative blurred vision, floaters, spots before eyes, infections, or other vision problems.  Negative significant changes in vision or hearing.  Negative tinnitus, vertigo, or infections.  Negative hay fever, sinus trouble, nasal discharge, bloody noses, or problems with smell.  C/O B/L ears feeling blocked.  Negative sore throat, bleeding gums, ulcers, or sores.   NECK:  Denies lumps, goiter, pain, swollen glands, or lymphadenopathy.  BREASTS:  Denies lumps, pain, nipple discharge, swelling, redness, or any other changes.  RESPIRATORY:  Denies cough, wheezing, shortness of breath, dyspnea, or orthopnea.  CARDIOVASCULAR:  Denies chest pain or palpitations.   GASTROINTESTINAL:  Appetite good, denies nausea, vomiting, or indigestion.  Bowel movements normal occurring about once daily or every other day.  URINARY:  Denies frequency, urgency, incontinence, dysuria, hematuria, nocturia, or recent flank pain.   GENITAL:  Denies vaginal discharge, pelvic infection, lesions, ulcers, or pain.   Negative dyspareunia or abnormal vaginal bleeding.  PERIPHERAL VASCULAR:  Denies varicosities, swelling, skin changes, or pain.  MUSCULOSKELETAL:  Denies back, joint, or muscle pain.    Negative problems with mobility or movement.   PSYCHIATRIC:  Denies problems with depression, anxiety, anger, or other psychiatric symptoms.  NEUROLOGIC:  Denies fainting, dizziness, memory problems, seizures, tingling, motor or sensory loss.   HEMATOLOGIC:  Denies easy bruising, bleeding, or anemia.  ENDOCRINE:  Denies thyroid problems, temperature intolerance, excessive sweating, or other endocrine symptoms.     Past Medical History:     Past Medical History:   Diagnosis Date    Allergic rhinitis     Carpal  tunnel syndrome on both sides     Cervical disc herniation     DVT (deep venous thrombosis) (HCC)     DVT Left Leg    Fracture of foot     left casted    History of mammogram 2017    Being followed by Dr. Abraham    History of Papanicolaou smear of cervix 2016    Dr. Abraham    Hx of colonoscopy 2016    Dr. Kemp    Hyperlipemia     Hypertension     Hypothyroidism     Lichen sclerosus     Renal calculi     Rotator cuff tear, left       Past Surgical History:     Past Surgical History:   Procedure Laterality Date    CERVICAL LAMINECTOMY N/A     CHOLECYSTECTOMY N/A     KNEE CARTILAGE SURGERY Bilateral     LITHOTRIPSY N/A     NEUROPLASTY / TRANSPOSITION MEDIAN NERVE AT CARPAL TUNNEL BILATERAL Bilateral     ROTATOR CUFF REPAIR Left     TONSILLECTOMY AND ADENOIDECTOMY Bilateral     TONSILLECTOMY AND ADENOIDECTOMY      TUBAL LIGATION N/A     US GUIDED THYROID BIOPSY  11/2018    WRIST FRACTURE SURGERY Right     plates and pins       Social History:     Social History     Socioeconomic History    Marital status: /Civil Union     Spouse name: None    Number of children: None    Years of education: None    Highest education level: None   Occupational History    None   Tobacco Use    Smoking status: Every Day     Current packs/day: 0.25     Average packs/day: 0.3 packs/day for 44.0 years (11.0 ttl pk-yrs)     Types: Cigarettes    Smokeless tobacco: Never    Tobacco comments:     8/25/23-smoke 5 cigarettes daily.    Vaping Use    Vaping status: Never Used   Substance and Sexual Activity    Alcohol use: No    Drug use: No    Sexual activity: Not Currently   Other Topics Concern    None   Social History Narrative    None     Social Determinants of Health     Financial Resource Strain: Not on file   Food Insecurity: Not on file   Transportation Needs: Not on file   Physical Activity: Not on file   Stress: Not on file   Social Connections: Not on file   Intimate Partner Violence: Not on file   Housing Stability: Not on file       "Family History:     Family History   Problem Relation Age of Onset    Hypertension Mother     Hyperlipidemia Mother     Hypothyroidism Mother     Brain cancer Mother     Hypertension Father     Celiac disease Father     Ovarian cancer Sister     Celiac disease Brother     Hypothyroidism Brother     Diabetes Paternal Grandmother     Hypothyroidism Sister     Coronary artery disease Brother     Hypertension Brother     Hyperlipidemia Brother     Diabetes Sister       Current Medications:     Current Outpatient Medications   Medication Sig Dispense Refill    acetaminophen (TYLENOL) 325 mg tablet Take 2 tablets (650 mg total) by mouth every 6 (six) hours as needed for mild pain, headaches or fever  0    ascorbic acid (VITAMIN C) 250 mg tablet Take 500 mg by mouth daily      cholecalciferol (VITAMIN D3) 1,000 units tablet Take 2 tablets (2,000 Units total) by mouth daily 90 tablet 1    fluticasone (FLONASE) 50 mcg/act nasal spray 1 spray each nostril 2 x daily x 14 days then prn congestion 18.2 mL 3    Garlic 100 MG TABS Take by mouth      levothyroxine 150 mcg tablet TAKE 1 TABLET BY MOUTH ONCE DAILY IN  THE  EARLY  MORNING 90 tablet 1    loratadine (CLARITIN) 10 mg tablet 1 tab po daily x 14 days then prn allergies 30 tablet 5    metoprolol succinate (TOPROL-XL) 25 mg 24 hr tablet Take 25 mg by mouth daily      vitamin B-12 (VITAMIN B-12) 1,000 mcg tablet Take 1 tablet (1,000 mcg total) by mouth 3 (three) times a week 90 tablet 1    Zinc 25 MG TABS Take 1 tablet by mouth daily      ketoconazole (NIZORAL) 2 % cream Apply topically as needed for itching       No current facility-administered medications for this visit.      Allergies:     Allergies   Allergen Reactions    Atorvastatin Myalgia      Physical Exam:     /84 (BP Location: Left arm, Patient Position: Sitting)   Pulse 92   Temp 97.7 °F (36.5 °C) (Tympanic)   Resp 16   Ht 5' 7\" (1.702 m)   Wt 125 kg (276 lb)   LMP 01/01/2004   SpO2 96%   BMI 43.23 " kg/m²     Physical Exam  Vitals and nursing note reviewed.      GENERAL:  Appears well nourished, well groomed, in no acute distress.  SKIN:  Palms warm, dry, color good.  Nails without clubbing or cyanosis.    No lesions, ulcerations, or wounds.  HEAD:  Hair is average texture.  Scalp without lesions, normocephalic, and atraumatic.  EYES:  Visual fields full by confrontation.   EARS:  B/L ear canals clear.  B/L TMs clear with clear effusion and decreased light reflex.    NOSE: Mucosa pink, moist, septum midline.  Negative sinus tenderness.   B/L turbinates pale and edematous with clear drainage.   MOUTH:  Oral mucosa pink.  Pharynx pink, moist, without swelling, redness, or exudate.  Dentition ok.   Tongue midline.   NECK:  Supple, trachea midline, Negative thyromegaly, lymphadenopathy, or swollen glands.  LYMPH NODES:  Negative enlargement of neck, axillary, epitrochlear, or inguinal nodes.  THORAX/LUNGS  Thorax symmetric with good excursion.   Lungs resonant.  Breath sounds vesicular with no added sounds.    Diaphragm descends within normal limits.   CARDIOVASCULAR:  Carotid upstrokes brisk and without bruits.   Apical impulse discrete and tapping, barely palpable in the 5th ICS/MCL.    Normal S1 and Normal S2, Negative S3 or S4.    Negative murmurs, thrills, lifts, or heaves.  ABDOMEN:  Protuberant, bowel sounds normal active x 4 quadrants.    Negative tenderness.  Negative masses.  Negative hepatomegaly.  Negative splenomegaly.  Negative costovertebral tenderness.  EXTREMITIES:  Warm, calves supple, non-tender, negative for edema.    Negative stasis pigmentation or ulcers.  +2 pulses throughout.  MUSCULOSKELETAL:  Negative joint deformities.    Good range of motion in hands, wrists, elbows, shoulders, spine, hips, knees, and ankles.  NEUROLOGICAL:  Mental status:  Awake, alert, and oriented to person, place, time, and event. Normal thought processes.           KELLIE Duong  [facility]  PRACTICE

## 2024-03-14 ENCOUNTER — APPOINTMENT (OUTPATIENT)
Dept: LAB | Facility: CLINIC | Age: 69
End: 2024-03-14

## 2024-03-14 DIAGNOSIS — Z00.6 ENCOUNTER FOR EXAMINATION FOR NORMAL COMPARISON OR CONTROL IN CLINICAL RESEARCH PROGRAM: ICD-10-CM

## 2024-03-14 PROCEDURE — 36415 COLL VENOUS BLD VENIPUNCTURE: CPT

## 2024-03-21 ENCOUNTER — OFFICE VISIT (OUTPATIENT)
Dept: URGENT CARE | Facility: CLINIC | Age: 69
End: 2024-03-21
Payer: COMMERCIAL

## 2024-03-21 VITALS
OXYGEN SATURATION: 96 % | HEART RATE: 86 BPM | TEMPERATURE: 98.3 F | SYSTOLIC BLOOD PRESSURE: 131 MMHG | DIASTOLIC BLOOD PRESSURE: 74 MMHG | RESPIRATION RATE: 18 BRPM

## 2024-03-21 DIAGNOSIS — N30.00 ACUTE CYSTITIS WITHOUT HEMATURIA: Primary | ICD-10-CM

## 2024-03-21 LAB
SL AMB  POCT GLUCOSE, UA: ABNORMAL
SL AMB LEUKOCYTE ESTERASE,UA: ABNORMAL
SL AMB POCT BILIRUBIN,UA: ABNORMAL
SL AMB POCT BLOOD,UA: ABNORMAL
SL AMB POCT CLARITY,UA: ABNORMAL
SL AMB POCT COLOR,UA: YELLOW
SL AMB POCT KETONES,UA: ABNORMAL
SL AMB POCT NITRITE,UA: POSITIVE
SL AMB POCT PH,UA: 7.5
SL AMB POCT SPECIFIC GRAVITY,UA: 1005
SL AMB POCT URINE PROTEIN: ABNORMAL
SL AMB POCT UROBILINOGEN: 0.2

## 2024-03-21 PROCEDURE — 87077 CULTURE AEROBIC IDENTIFY: CPT

## 2024-03-21 PROCEDURE — 99213 OFFICE O/P EST LOW 20 MIN: CPT

## 2024-03-21 PROCEDURE — 81002 URINALYSIS NONAUTO W/O SCOPE: CPT

## 2024-03-21 PROCEDURE — 87186 SC STD MICRODIL/AGAR DIL: CPT

## 2024-03-21 PROCEDURE — 87086 URINE CULTURE/COLONY COUNT: CPT

## 2024-03-21 RX ORDER — SULFAMETHOXAZOLE AND TRIMETHOPRIM 800; 160 MG/1; MG/1
1 TABLET ORAL EVERY 12 HOURS SCHEDULED
Qty: 10 TABLET | Refills: 0 | Status: SHIPPED | OUTPATIENT
Start: 2024-03-21 | End: 2024-03-26

## 2024-03-21 NOTE — PATIENT INSTRUCTIONS
1. Start antibiotic as directed.    2.  Push fluids.    3.  If burning on urination, you may try over the counter Azo Urinary Pain Relief or comparable product.  Please note that this type of product may cause your urine to become bright orange and it may stain your undergarments if you leak.  Please wear protection as needed.      4.  Your urine will be sent for culture and sensitivity to try and make sure that the medicine prescribed is appropriate for your infection.  Results take approximately 72 hours to return.  If you have not heard from your [facility] provider by 4 days after your office visit, please call phone number at the top of your clinical summary for your results.      5.  If develop worsening symptoms with associated fever, back pain, nausea with vomiting, please proceed to emergency room for further evaluation.    6.  If you have recurrent urinary tract problems, please follow up with your PCP and / or urologist for further evaluation.

## 2024-03-21 NOTE — PROGRESS NOTES
[facility]        NAME: Natividad Reis is a 69 y.o. female  : 1955    MRN: [id number]  DATE: 2024  TIME: 3:10 PM    Assessment and Plan   Acute cystitis without hematuria [N30.00]  1. Acute cystitis without hematuria  POCT urine dip    Urine culture    sulfamethoxazole-trimethoprim (BACTRIM DS) 800-160 mg per tablet      Urine dip positive  Will send culture  Will treat with bactrim. pt has tolerated this well in the past.     Patient Instructions   1. Start antibiotic as directed.    2.  Push fluids.    3.  If burning on urination, you may try over the counter Azo Urinary Pain Relief or comparable product.  Please note that this type of product may cause your urine to become bright orange and it may stain your undergarments if you leak.  Please wear protection as needed.      4.  Your urine will be sent for culture and sensitivity to try and make sure that the medicine prescribed is appropriate for your infection.  Results take approximately 72 hours to return.  If you have not heard from your [facility] provider by 4 days after your office visit, please call phone number at the top of your clinical summary for your results.      5.  If develop worsening symptoms with associated fever, back pain, nausea with vomiting, please proceed to emergency room for further evaluation.    6.  If you have recurrent urinary tract problems, please follow up with your PCP and / or urologist for further evaluation.       Follow up with PCP in 3-5 days.  Proceed to  ER if symptoms worsen.    Chief Complaint     Chief Complaint   Patient presents with    Possible UTI         History of Present Illness       Patient is a 69 year old female who presents to the office today for increased urinary frequency, pressure, and mal odor that started Monday. Symptoms are unchanged. Denies fever or chills. Denies blood in urine. Denies concern for STI        Review of Systems   Review of Systems   Constitutional:   Negative for chills and fever.   Genitourinary:  Positive for decreased urine volume, frequency and urgency. Negative for dysuria, genital sores and hematuria.   All other systems reviewed and are negative.        Current Medications       Current Outpatient Medications:     ascorbic acid (VITAMIN C) 250 mg tablet, Take 500 mg by mouth daily, Disp: , Rfl:     cholecalciferol (VITAMIN D3) 1,000 units tablet, Take 2 tablets (2,000 Units total) by mouth daily, Disp: 90 tablet, Rfl: 1    fluticasone (FLONASE) 50 mcg/act nasal spray, 1 spray each nostril 2 x daily x 14 days then prn congestion, Disp: 18.2 mL, Rfl: 3    Garlic 100 MG TABS, Take by mouth, Disp: , Rfl:     ketoconazole (NIZORAL) 2 % cream, Apply topically as needed for itching, Disp: 60 g, Rfl: 5    levothyroxine 150 mcg tablet, Take 1 tablet (150 mcg total) by mouth daily, Disp: 90 tablet, Rfl: 1    loratadine (CLARITIN) 10 mg tablet, 1 tab po daily x 14 days then prn allergies, Disp: 30 tablet, Rfl: 5    metoprolol succinate (TOPROL-XL) 25 mg 24 hr tablet, Take 25 mg by mouth daily, Disp: , Rfl:     sulfamethoxazole-trimethoprim (BACTRIM DS) 800-160 mg per tablet, Take 1 tablet by mouth every 12 (twelve) hours for 5 days, Disp: 10 tablet, Rfl: 0    vitamin B-12 (VITAMIN B-12) 1,000 mcg tablet, Take 1 tablet (1,000 mcg total) by mouth 3 (three) times a week, Disp: 90 tablet, Rfl: 1    Zinc 25 MG TABS, Take 1 tablet by mouth daily, Disp: , Rfl:     acetaminophen (TYLENOL) 325 mg tablet, Take 2 tablets (650 mg total) by mouth every 6 (six) hours as needed for mild pain, headaches or fever, Disp: , Rfl: 0    Current Allergies     Allergies as of 03/21/2024 - Reviewed 03/21/2024   Allergen Reaction Noted    Atorvastatin Myalgia 07/05/2023            The following portions of the patient's history were reviewed and updated as appropriate: allergies, current medications, past family history, past medical history, past social history, past surgical history and  problem list.     Past Medical History:   Diagnosis Date    Allergic rhinitis     Carpal tunnel syndrome on both sides     Cervical disc herniation     DVT (deep venous thrombosis) (HCC)     DVT Left Leg    Fracture of foot     left casted    History of mammogram 2017    Being followed by Dr. Abraham    History of Papanicolaou smear of cervix 2016    Dr. Abraham    Hx of colonoscopy 2016    Dr. Kemp    Hyperlipemia     Hypertension     Hypothyroidism     Lichen sclerosus     Renal calculi     Rotator cuff tear, left        Past Surgical History:   Procedure Laterality Date    CERVICAL LAMINECTOMY N/A     CHOLECYSTECTOMY N/A     KNEE CARTILAGE SURGERY Bilateral     LITHOTRIPSY N/A     NEUROPLASTY / TRANSPOSITION MEDIAN NERVE AT CARPAL TUNNEL BILATERAL Bilateral     ROTATOR CUFF REPAIR Left     TONSILLECTOMY AND ADENOIDECTOMY Bilateral     TONSILLECTOMY AND ADENOIDECTOMY      TUBAL LIGATION N/A     US GUIDED THYROID BIOPSY  11/2018    WRIST FRACTURE SURGERY Right     plates and pins        Family History   Problem Relation Age of Onset    Hypertension Mother     Hyperlipidemia Mother     Hypothyroidism Mother     Brain cancer Mother     Hypertension Father     Celiac disease Father     Ovarian cancer Sister     Celiac disease Brother     Hypothyroidism Brother     Diabetes Paternal Grandmother     Hypothyroidism Sister     Coronary artery disease Brother     Hypertension Brother     Hyperlipidemia Brother     Diabetes Sister          Medications have been verified.        Objective   /74   Pulse 86   Temp 98.3 °F (36.8 °C)   Resp 18   LMP 01/01/2004   SpO2 96%        Physical Exam     Physical Exam  Vitals and nursing note reviewed.   Constitutional:       General: She is not in acute distress.     Appearance: Normal appearance. She is not ill-appearing or toxic-appearing.   Cardiovascular:      Rate and Rhythm: Normal rate and regular rhythm.      Pulses: Normal pulses.      Heart sounds: Normal heart sounds.    Pulmonary:      Effort: Pulmonary effort is normal.      Breath sounds: Normal breath sounds.   Abdominal:      Tenderness: There is abdominal tenderness in the suprapubic area. There is no right CVA tenderness or left CVA tenderness.   Skin:     General: Skin is warm.      Capillary Refill: Capillary refill takes less than 2 seconds.   Neurological:      Mental Status: She is alert.

## 2024-03-23 LAB
BACTERIA UR CULT: ABNORMAL
BACTERIA UR CULT: ABNORMAL

## 2024-04-02 LAB
APOB+LDLR+PCSK9 GENE MUT ANL BLD/T: NOT DETECTED
BRCA1+BRCA2 DEL+DUP + FULL MUT ANL BLD/T: NOT DETECTED
MLH1+MSH2+MSH6+PMS2 GN DEL+DUP+FUL M: NOT DETECTED

## 2024-05-16 ENCOUNTER — HOSPITAL ENCOUNTER (EMERGENCY)
Facility: HOSPITAL | Age: 69
Discharge: HOME | End: 2024-05-16
Attending: EMERGENCY MEDICINE
Payer: COMMERCIAL

## 2024-05-16 ENCOUNTER — APPOINTMENT (EMERGENCY)
Dept: CT IMAGING | Facility: HOSPITAL | Age: 69
End: 2024-05-16
Payer: COMMERCIAL

## 2024-05-16 VITALS
RESPIRATION RATE: 14 BRPM | OXYGEN SATURATION: 96 % | SYSTOLIC BLOOD PRESSURE: 181 MMHG | TEMPERATURE: 97.9 F | DIASTOLIC BLOOD PRESSURE: 87 MMHG | HEART RATE: 60 BPM

## 2024-05-16 DIAGNOSIS — R07.9 CHEST PAIN: Primary | ICD-10-CM

## 2024-05-16 LAB
ANION GAP SERPL CALCULATED.3IONS-SCNC: 5 MMOL/L (ref 4–13)
BASOPHILS # BLD AUTO: 0.05 THOUSANDS/ÂΜL (ref 0–0.1)
BASOPHILS NFR BLD AUTO: 1 % (ref 0–1)
BUN SERPL-MCNC: 16 MG/DL (ref 5–25)
CALCIUM SERPL-MCNC: 9.9 MG/DL (ref 8.4–10.2)
CARDIAC TROPONIN I PNL SERPL HS: 4 NG/L
CHLORIDE SERPL-SCNC: 104 MMOL/L (ref 96–108)
CO2 SERPL-SCNC: 28 MMOL/L (ref 21–32)
CREAT SERPL-MCNC: 0.79 MG/DL (ref 0.6–1.3)
D DIMER PPP FEU-MCNC: 1.34 UG/ML FEU
EOSINOPHIL # BLD AUTO: 0.12 THOUSAND/ÂΜL (ref 0–0.61)
EOSINOPHIL NFR BLD AUTO: 2 % (ref 0–6)
ERYTHROCYTE [DISTWIDTH] IN BLOOD BY AUTOMATED COUNT: 14.4 % (ref 11.6–15.1)
GFR SERPL CREATININE-BSD FRML MDRD: 76 ML/MIN/1.73SQ M
GLUCOSE SERPL-MCNC: 95 MG/DL (ref 65–140)
HCT VFR BLD AUTO: 46 % (ref 34.8–46.1)
HGB BLD-MCNC: 14.5 G/DL (ref 11.5–15.4)
IMM GRANULOCYTES # BLD AUTO: 0.01 THOUSAND/UL (ref 0–0.2)
IMM GRANULOCYTES NFR BLD AUTO: 0 % (ref 0–2)
LYMPHOCYTES # BLD AUTO: 2.74 THOUSANDS/ÂΜL (ref 0.6–4.47)
LYMPHOCYTES NFR BLD AUTO: 35 % (ref 14–44)
MCH RBC QN AUTO: 29.7 PG (ref 26.8–34.3)
MCHC RBC AUTO-ENTMCNC: 31.5 G/DL (ref 31.4–37.4)
MCV RBC AUTO: 94 FL (ref 82–98)
MONOCYTES # BLD AUTO: 0.52 THOUSAND/ÂΜL (ref 0.17–1.22)
MONOCYTES NFR BLD AUTO: 7 % (ref 4–12)
NEUTROPHILS # BLD AUTO: 4.51 THOUSANDS/ÂΜL (ref 1.85–7.62)
NEUTS SEG NFR BLD AUTO: 55 % (ref 43–75)
NRBC BLD AUTO-RTO: 0 /100 WBCS
PLATELET # BLD AUTO: 204 THOUSANDS/UL (ref 149–390)
PMV BLD AUTO: 10.3 FL (ref 8.9–12.7)
POTASSIUM SERPL-SCNC: 4 MMOL/L (ref 3.5–5.3)
RBC # BLD AUTO: 4.88 MILLION/UL (ref 3.81–5.12)
SODIUM SERPL-SCNC: 137 MMOL/L (ref 135–147)
WBC # BLD AUTO: 7.95 THOUSAND/UL (ref 4.31–10.16)

## 2024-05-16 PROCEDURE — 99285 EMERGENCY DEPT VISIT HI MDM: CPT

## 2024-05-16 PROCEDURE — 85025 COMPLETE CBC W/AUTO DIFF WBC: CPT | Performed by: EMERGENCY MEDICINE

## 2024-05-16 PROCEDURE — 84484 ASSAY OF TROPONIN QUANT: CPT | Performed by: EMERGENCY MEDICINE

## 2024-05-16 PROCEDURE — 96360 HYDRATION IV INFUSION INIT: CPT

## 2024-05-16 PROCEDURE — 99285 EMERGENCY DEPT VISIT HI MDM: CPT | Performed by: EMERGENCY MEDICINE

## 2024-05-16 PROCEDURE — 80048 BASIC METABOLIC PNL TOTAL CA: CPT | Performed by: EMERGENCY MEDICINE

## 2024-05-16 PROCEDURE — 74174 CTA ABD&PLVS W/CONTRAST: CPT

## 2024-05-16 PROCEDURE — 85379 FIBRIN DEGRADATION QUANT: CPT | Performed by: EMERGENCY MEDICINE

## 2024-05-16 PROCEDURE — 71275 CT ANGIOGRAPHY CHEST: CPT

## 2024-05-16 PROCEDURE — 36415 COLL VENOUS BLD VENIPUNCTURE: CPT | Performed by: EMERGENCY MEDICINE

## 2024-05-16 PROCEDURE — 93005 ELECTROCARDIOGRAM TRACING: CPT

## 2024-05-16 RX ADMIN — SODIUM CHLORIDE 1000 ML: 0.9 INJECTION, SOLUTION INTRAVENOUS at 12:47

## 2024-05-16 RX ADMIN — IOHEXOL 100 ML: 350 INJECTION, SOLUTION INTRAVENOUS at 13:40

## 2024-05-16 NOTE — ED PROVIDER NOTES
"Final Diagnosis:  1. Chest pain        Chief Complaint   Patient presents with    Chest Pain     Chest pain that started this morning around 0900, pain radiates to right side and to her back. States that she has a \"gurgling feeling\" in her abdomen      HPI  Patient presents for evaluation of chest pain.  Patient tells me that last night she started to have some generalized back pain.  She felt like she could probably just go to her chiropractor and get adjusted and then this morning she had the general onset of chest pain that she identifies as being substernal or just on the medial border of her left breast.  She states that at times she will feel this pain going down towards her abdomen and giving a gurgling type feeling on the left side and left lower quadrant.  States that she does have some nausea without any vomiting.  No fever or chills.  Denies any trauma.  No shortness of breath.  No known sick contacts.    Patient does have a pacemaker in place secondary to sick sinus syndrome.  Was evaluated by cardiology yesterday per record review.  No acute changes at that time.      Unless otherwise specified:  - No language barrier.   - History obtained from patient.   - There are no limitations to the history obtained.  - Previous charting was reviewed    PMH:   has a past medical history of Allergic rhinitis, Carpal tunnel syndrome on both sides, Cervical disc herniation, DVT (deep venous thrombosis) (HCC), Fracture of foot, History of mammogram (2017), History of Papanicolaou smear of cervix (2016), colonoscopy (2016), Hyperlipemia, Hypertension, Hypothyroidism, Lichen sclerosus, Renal calculi, and Rotator cuff tear, left.    PSH:   has a past surgical history that includes Neuroplasty / transposition median nerve at carpal tunnel bilateral (Bilateral); Tonsillectomy and adenoidectomy (Bilateral); Cervical laminectomy (N/A); Rotator cuff repair (Left); Tubal ligation (N/A); Lithotripsy (N/A); Cholecystectomy " (N/A); Knee cartilage surgery (Bilateral); Wrist fracture surgery (Right); Tonsillectomy and adenoidectomy; and US guided thyroid biopsy (11/2018).       ROS:  Review of Systems   - 13 point ROS was performed and all are normal unless stated in the history above.   - Nursing note reviewed. Vitals reviewed.   - Orders placed by myself and/or advanced practitioner / resident.        PE:   Vitals:    05/16/24 1330 05/16/24 1430 05/16/24 1500 05/16/24 1600   BP: 169/83 (!) 188/86 (!) 188/86 (!) 181/87   BP Location: Right arm Right arm Right arm Right arm   Pulse: 60 60 60 60   Resp: 22 16 15 14   Temp:       TempSrc:       SpO2: 97% 99% 95% 96%     Vitals reviewed by me.     Patient is nondistressed in bed.  Normotensive.  Unless otherwise specified above:    General: VS reviewed  Appears in NAD    Head: Normocephalic, atraumatic  Eyes: EOM-I.     ENT: Atraumatic external nose and ears.    No drooling.     Neck: No JVD.    CV: No pallor noted  Lungs:   No tachypnea  No respiratory distress    Abdomen:  Soft, non-tender, non-distended    MSK:   No obvious deformity    Skin: Dry, intact. No obvious rash.    Psychiatric/Behavioral: Appropriate mood and affect   Exam: deferred    Physical Exam     Procedures   A:  - Nursing note reviewed.    HEART Risk Score      Flowsheet Row Most Recent Value   Heart Score Risk Calculator    History 0 Filed at: 05/16/2024 1547   ECG 0 Filed at: 05/16/2024 1547   Age 2 Filed at: 05/16/2024 1547   Risk Factors 1 Filed at: 05/16/2024 1547   Troponin 0 Filed at: 05/16/2024 1547   HEART Score 3 Filed at: 05/16/2024 1547                       ED Course as of 05/16/24 1627   Thu May 16, 2024   1444 CTA dissection protocol chest abdomen pelvis w wo contrast  I do not appreciate any large dissections on my interpretation     CTA dissection protocol chest abdomen pelvis w wo contrast   Final Result   No aortic aneurysm or dissection.   No acute intrathoracic or intra-abdominal pathology.  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 The appearance is similar to 2023.   12-month stability of the 4 mm left lower lobe nodule, suggestive of benign nature.   Other findings, as per the body of the report.               Workstation performed: [id number]           Orders Placed This Encounter   Procedures    CTA dissection protocol chest abdomen pelvis w wo contrast    CBC and differential    Basic metabolic panel    D-Dimer    HS Troponin 0hr (reflex protocol)    Insert peripheral IV    ECG 12 lead    ECG 12 lead     Labs Reviewed   D-DIMER, QUANTITATIVE - Abnormal       Result Value Ref Range Status    D-Dimer, Quant 1.34 (*) <0.50 ug/ml FEU Final    Comment: Reference and upper limits to exclude DVT and PE are the same.  Do not use to exclude if clinical symptoms are present.  Pregnant women:  1st trimester:  <0.22 - 1.06 ug/ml FEU  2nd trimester:  <0.22 - 1.88 ug/ml FEU  3rd trimester:   0.24 - 3.28 ug/ml FEU    Note: Normal ranges may not apply to patients who are transgender, non-binary, or whose legal sex, sex at birth, and gender identity differ.      Narrative:     In the evaluation for possible pulmonary embolism, in the appropriate (Well's Score of 4 or less) patient, the age adjusted d-dimer cutoff for this patient can be calculated as:    Age x 0.01 (in ug/mL) for Age-adjusted D-dimer exclusion threshold for a patient over 50 years.   HS TROPONIN I 0HR - Normal    hs TnI 0hr 4  \"Refer to ACS Flowchart\"- see link ng/L Final    Comment:                                              Initial (time 0) result  If >=50 ng/L, Myocardial injury suggested ;  Type of myocardial injury and treatment strategy  to be determined.  If 5-49 ng/L, a delta result at 2 hours and or 4 hours will be needed to further evaluate.  If <4 ng/L, and chest pain has been >3 hours since onset, patient may qualify for discharge based on the HEART score in the ED.  If <5 ng/L and <3hours " since onset of chest pain, a delta result at 2 hours will be needed to further evaluate.    HS Troponin 99th Percentile URL of a Health Population=12 ng/L with a 95% Confidence Interval of 8-18 ng/L.    Second Troponin (time 2 hours)  If calculated delta >= 20 ng/L,  Myocardial injury suggested ; Type of myocardial injury and treatment strategy to be determined.  If 5-49 ng/L and the calculated delta is 5-19 ng/L, consult medical service for evaluation.  Continue evaluation for ischemia on ecg and other possible etiology and repeat hs troponin at 4 hours.  If delta is <5 ng/L at 2 hours, consider discharge based on risk stratification via the HEART score (if in ED), or JANET risk score in IP/Observation.    HS Troponin 99th Percentile URL of a Health Population=12 ng/L with a 95% Confidence Interval of 8-18 ng/L.   CBC AND DIFFERENTIAL    WBC 7.95  4.31 - 10.16 Thousand/uL Final    RBC 4.88  3.81 - 5.12 Million/uL Final    Hemoglobin 14.5  11.5 - 15.4 g/dL Final    Hematocrit 46.0  34.8 - 46.1 % Final    MCV 94  82 - 98 fL Final    MCH 29.7  26.8 - 34.3 pg Final    MCHC 31.5  31.4 - 37.4 g/dL Final    RDW 14.4  11.6 - 15.1 % Final    MPV 10.3  8.9 - 12.7 fL Final    Platelets 204  149 - 390 Thousands/uL Final    nRBC 0  /100 WBCs Final    Segmented % 55  43 - 75 % Final    Immature Grans % 0  0 - 2 % Final    Lymphocytes % 35  14 - 44 % Final    Monocytes % 7  4 - 12 % Final    Eosinophils Relative 2  0 - 6 % Final    Basophils Relative 1  0 - 1 % Final    Absolute Neutrophils 4.51  1.85 - 7.62 Thousands/µL Final    Absolute Immature Grans 0.01  0.00 - 0.20 Thousand/uL Final    Absolute Lymphocytes 2.74  0.60 - 4.47 Thousands/µL Final    Absolute Monocytes 0.52  0.17 - 1.22 Thousand/µL Final    Eosinophils Absolute 0.12  0.00 - 0.61 Thousand/µL Final    Basophils Absolute 0.05  0.00 - 0.10 Thousands/µL Final   BASIC METABOLIC PANEL    Sodium 137  135 - 147 mmol/L Final    Potassium 4.0  3.5 - 5.3 mmol/L Final     Chloride 104  96 - 108 mmol/L Final    CO2 28  21 - 32 mmol/L Final    ANION GAP 5  4 - 13 mmol/L Final    BUN 16  5 - 25 mg/dL Final    Creatinine 0.79  0.60 - 1.30 mg/dL Final    Comment: Standardized to IDMS reference method    Glucose 95  65 - 140 mg/dL Final    Comment: If the patient is fasting, the ADA then defines impaired fasting glucose as > 100 mg/dL and diabetes as > or equal to 123 mg/dL.    Calcium 9.9  8.4 - 10.2 mg/dL Final    eGFR 76  ml/min/1.73sq m Final    Narrative:     National Kidney Disease Foundation guidelines for Chronic Kidney Disease (CKD):     Stage 1 with normal or high GFR (GFR > 90 mL/min/1.73 square meters)    Stage 2 Mild CKD (GFR = 60-89 mL/min/1.73 square meters)    Stage 3A Moderate CKD (GFR = 45-59 mL/min/1.73 square meters)    Stage 3B Moderate CKD (GFR = 30-44 mL/min/1.73 square meters)    Stage 4 Severe CKD (GFR = 15-29 mL/min/1.73 square meters)    Stage 5 End Stage CKD (GFR <15 mL/min/1.73 square meters)  Note: GFR calculation is accurate only with a steady state creatinine         Final Diagnosis:  1. Chest pain        P:  -Patient presents for evaluation of back pain radiating to chest and abdomen.  Given her age and the description of symptoms concern for possible dissection.  Overall I believe this to be less likely based on her current physical exam as well as her blood pressure but we will still provide a CTA to rule this out.  Also evaluate for any pneumothorax, large PE, intra-abdominal pathology such as obstruction.  Also evaluate for ACS, arrhythmia, anemia.  - Laboratory analysis was unremarkable.  CT imaging without acute pathology.  Reviewed all with patient.  Unclear what the source of her discomfort was.  Patient was improved here while in emergency department.  Return precautions reviewed.      Unless otherwise noted the patient's medications were reviewed and they should continue as directed.    Unless otherwise specified:  CC is exclusive from any  separately billable procedures  CC is exclusive of treating other patients  CC is exclusive of teaching time   All splints are static    Medications   sodium chloride 0.9 % bolus 1,000 mL (0 mL Intravenous Stopped 5/16/24 1347)   iohexol (OMNIPAQUE) 350 MG/ML injection (MULTI-DOSE) 100 mL (100 mL Intravenous Given 5/16/24 1340)     Time reflects when diagnosis was documented in both MDM as applicable and the Disposition within this note       Time User Action Codes Description Comment    5/16/2024  3:47 PM Mason Espinoza Add [R07.9] Chest pain           ED Disposition       ED Disposition   Discharge    Condition   Stable    Date/Time   Thu May 16, 2024 1958    Comment   Natividad R Letcavage discharge to home/self care.                   Follow-up Information       Follow up With Specialties Details Why Contact Info    [facility], Nurse Practitioner   [address] [place] [id number] [phone]            Discharge Medication List as of 5/16/2024  3:48 PM        CONTINUE these medications which have NOT CHANGED    Details   acetaminophen (TYLENOL) 325 mg tablet Take 2 tablets (650 mg total) by mouth every 6 (six) hours as needed for mild pain, headaches or fever, Starting Sat 3/11/2023, No Print      ascorbic acid (VITAMIN C) 250 mg tablet Take 500 mg by mouth daily, Historical Med      cholecalciferol (VITAMIN D3) 1,000 units tablet Take 2 tablets (2,000 Units total) by mouth daily, Starting Mon 4/18/2022, No Print      fluticasone (FLONASE) 50 mcg/act nasal spray 1 spray each nostril 2 x daily x 14 days then prn congestion, Normal      Garlic 100 MG TABS Take by mouth, Historical Med      ketoconazole (NIZORAL) 2 % cream Apply topically as needed for itching, Starting Wed 3/13/2024, Normal      levothyroxine 150 mcg tablet Take 1 tablet (150 mcg total) by mouth daily, Starting Wed 3/13/2024, Normal      loratadine (CLARITIN) 10 mg tablet 1 tab po daily x 14 days then prn  "allergies, Normal      metoprolol succinate (TOPROL-XL) 25 mg 24 hr tablet Take 25 mg by mouth daily, Historical Med      vitamin B-12 (VITAMIN B-12) 1,000 mcg tablet Take 1 tablet (1,000 mcg total) by mouth 3 (three) times a week, Starting Wed 3/13/2024, Normal      Zinc 25 MG TABS Take 1 tablet by mouth daily, Historical Med           No discharge procedures on file.  Prior to Admission Medications   Prescriptions Last Dose Informant Patient Reported? Taking?   Garlic 100 MG TABS   Yes No   Sig: Take by mouth   Zinc 25 MG TABS  Self Yes No   Sig: Take 1 tablet by mouth daily   acetaminophen (TYLENOL) 325 mg tablet  Self No No   Sig: Take 2 tablets (650 mg total) by mouth every 6 (six) hours as needed for mild pain, headaches or fever   ascorbic acid (VITAMIN C) 250 mg tablet  Self Yes No   Sig: Take 500 mg by mouth daily   cholecalciferol (VITAMIN D3) 1,000 units tablet  Self No No   Sig: Take 2 tablets (2,000 Units total) by mouth daily   fluticasone (FLONASE) 50 mcg/act nasal spray   No No   Si spray each nostril 2 x daily x 14 days then prn congestion   ketoconazole (NIZORAL) 2 % cream   No No   Sig: Apply topically as needed for itching   levothyroxine 150 mcg tablet   No No   Sig: Take 1 tablet (150 mcg total) by mouth daily   loratadine (CLARITIN) 10 mg tablet   No No   Si tab po daily x 14 days then prn allergies   metoprolol succinate (TOPROL-XL) 25 mg 24 hr tablet   Yes No   Sig: Take 25 mg by mouth daily   vitamin B-12 (VITAMIN B-12) 1,000 mcg tablet   No No   Sig: Take 1 tablet (1,000 mcg total) by mouth 3 (three) times a week      Facility-Administered Medications: None       Portions of the record may have been created with voice recognition software. Occasional wrong word or \"sound a like\" substitutions may have occurred due to the inherent limitations of voice recognition software. Read the chart carefully and recognize, using context, where substitutions have occurred.    Electronically " signed by:  MD Mason Blue MD  05/16/24 [id number]

## 2024-05-17 LAB
ATRIAL RATE: 64 BPM
P AXIS: 49 DEGREES
PR INTERVAL: 196 MS
QRS AXIS: -47 DEGREES
QRSD INTERVAL: 96 MS
QT INTERVAL: 404 MS
QTC INTERVAL: 416 MS
T WAVE AXIS: 25 DEGREES
VENTRICULAR RATE: 64 BPM

## 2024-05-17 PROCEDURE — 93010 ELECTROCARDIOGRAM REPORT: CPT | Performed by: INTERNAL MEDICINE

## 2024-06-10 ENCOUNTER — HOSPITAL ENCOUNTER (OUTPATIENT)
Dept: RADIOLOGY | Facility: CLINIC | Age: 69
Discharge: HOME | End: 2024-06-10
Payer: MEDICARE

## 2024-06-10 DIAGNOSIS — E28.39 ESTROGEN DEFICIENCY: ICD-10-CM

## 2024-06-10 DIAGNOSIS — Z78.0 POST-MENOPAUSAL: ICD-10-CM

## 2024-06-10 PROCEDURE — 77080 DXA BONE DENSITY AXIAL: CPT

## 2024-06-12 ENCOUNTER — HOSPITAL ENCOUNTER (OUTPATIENT)
Dept: RADIOLOGY | Facility: CLINIC | Age: 69
Discharge: HOME | End: 2024-06-12
Payer: COMMERCIAL

## 2024-06-12 VITALS — BODY MASS INDEX: 43.32 KG/M2 | HEIGHT: 67 IN | WEIGHT: 276 LBS

## 2024-06-12 DIAGNOSIS — R92.8 MAMMOGRAM ABNORMAL: ICD-10-CM

## 2024-06-12 PROCEDURE — 76642 ULTRASOUND BREAST LIMITED: CPT

## 2024-09-25 ENCOUNTER — APPOINTMENT (OUTPATIENT)
Dept: LAB | Facility: CLINIC | Age: 69
End: 2024-09-25
Payer: COMMERCIAL

## 2024-09-25 DIAGNOSIS — E53.8 VITAMIN B12 DEFICIENCY: ICD-10-CM

## 2024-09-25 DIAGNOSIS — I10 ESSENTIAL HYPERTENSION: ICD-10-CM

## 2024-09-25 DIAGNOSIS — D50.8 OTHER IRON DEFICIENCY ANEMIA: ICD-10-CM

## 2024-09-25 DIAGNOSIS — E78.00 PURE HYPERCHOLESTEROLEMIA: ICD-10-CM

## 2024-09-25 DIAGNOSIS — E03.8 OTHER SPECIFIED HYPOTHYROIDISM: ICD-10-CM

## 2024-09-25 DIAGNOSIS — R73.9 HYPERGLYCEMIA: ICD-10-CM

## 2024-09-25 DIAGNOSIS — E55.9 VITAMIN D DEFICIENCY: ICD-10-CM

## 2024-09-25 LAB
25(OH)D3 SERPL-MCNC: 31.4 NG/ML (ref 30–100)
ALBUMIN SERPL BCG-MCNC: 4.1 G/DL (ref 3.5–5)
ALP SERPL-CCNC: 73 U/L (ref 34–104)
ALT SERPL W P-5'-P-CCNC: 20 U/L (ref 7–52)
ANION GAP SERPL CALCULATED.3IONS-SCNC: 10 MMOL/L (ref 4–13)
AST SERPL W P-5'-P-CCNC: 20 U/L (ref 13–39)
BASOPHILS # BLD AUTO: 0.06 THOUSANDS/ΜL (ref 0–0.1)
BASOPHILS NFR BLD AUTO: 1 % (ref 0–1)
BILIRUB SERPL-MCNC: 0.5 MG/DL (ref 0.2–1)
BUN SERPL-MCNC: 13 MG/DL (ref 5–25)
CALCIUM SERPL-MCNC: 9.6 MG/DL (ref 8.4–10.2)
CHLORIDE SERPL-SCNC: 103 MMOL/L (ref 96–108)
CHOLEST SERPL-MCNC: 307 MG/DL
CO2 SERPL-SCNC: 28 MMOL/L (ref 21–32)
CREAT SERPL-MCNC: 0.75 MG/DL (ref 0.6–1.3)
EOSINOPHIL # BLD AUTO: 0.18 THOUSAND/ΜL (ref 0–0.61)
EOSINOPHIL NFR BLD AUTO: 2 % (ref 0–6)
ERYTHROCYTE [DISTWIDTH] IN BLOOD BY AUTOMATED COUNT: 14.6 % (ref 11.6–15.1)
EST. AVERAGE GLUCOSE BLD GHB EST-MCNC: 117 MG/DL
GFR SERPL CREATININE-BSD FRML MDRD: 81 ML/MIN/1.73SQ M
GLUCOSE P FAST SERPL-MCNC: 102 MG/DL (ref 65–99)
HBA1C MFR BLD: 5.7 %
HCT VFR BLD AUTO: 47.5 % (ref 34.8–46.1)
HDLC SERPL-MCNC: 39 MG/DL
HGB BLD-MCNC: 14.9 G/DL (ref 11.5–15.4)
IMM GRANULOCYTES # BLD AUTO: 0.05 THOUSAND/UL (ref 0–0.2)
IMM GRANULOCYTES NFR BLD AUTO: 1 % (ref 0–2)
INSULIN SERPL-ACNC: 17.78 UIU/ML (ref 1.9–23)
LDLC SERPL CALC-MCNC: 226 MG/DL (ref 0–100)
LYMPHOCYTES # BLD AUTO: 2.48 THOUSANDS/ΜL (ref 0.6–4.47)
LYMPHOCYTES NFR BLD AUTO: 29 % (ref 14–44)
MCH RBC QN AUTO: 30.8 PG (ref 26.8–34.3)
MCHC RBC AUTO-ENTMCNC: 31.4 G/DL (ref 31.4–37.4)
MCV RBC AUTO: 98 FL (ref 82–98)
MONOCYTES # BLD AUTO: 0.59 THOUSAND/ΜL (ref 0.17–1.22)
MONOCYTES NFR BLD AUTO: 7 % (ref 4–12)
NEUTROPHILS # BLD AUTO: 5.3 THOUSANDS/ΜL (ref 1.85–7.62)
NEUTS SEG NFR BLD AUTO: 60 % (ref 43–75)
NONHDLC SERPL-MCNC: 268 MG/DL
NRBC BLD AUTO-RTO: 0 /100 WBCS
PLATELET # BLD AUTO: 223 THOUSANDS/UL (ref 149–390)
PMV BLD AUTO: 10.9 FL (ref 8.9–12.7)
POTASSIUM SERPL-SCNC: 5.1 MMOL/L (ref 3.5–5.3)
PROT SERPL-MCNC: 7.6 G/DL (ref 6.4–8.4)
RBC # BLD AUTO: 4.83 MILLION/UL (ref 3.81–5.12)
SODIUM SERPL-SCNC: 141 MMOL/L (ref 135–147)
TRIGL SERPL-MCNC: 212 MG/DL
TSH SERPL DL<=0.05 MIU/L-ACNC: 4.24 UIU/ML (ref 0.45–4.5)
VIT B12 SERPL-MCNC: 396 PG/ML (ref 180–914)
WBC # BLD AUTO: 8.66 THOUSAND/UL (ref 4.31–10.16)

## 2024-09-25 PROCEDURE — 36415 COLL VENOUS BLD VENIPUNCTURE: CPT

## 2024-09-25 PROCEDURE — 82306 VITAMIN D 25 HYDROXY: CPT

## 2024-09-25 PROCEDURE — 85025 COMPLETE CBC W/AUTO DIFF WBC: CPT

## 2024-09-25 PROCEDURE — 84443 ASSAY THYROID STIM HORMONE: CPT

## 2024-09-25 PROCEDURE — 80061 LIPID PANEL: CPT

## 2024-09-25 PROCEDURE — 80053 COMPREHEN METABOLIC PANEL: CPT

## 2024-09-25 PROCEDURE — 82607 VITAMIN B-12: CPT

## 2024-09-25 PROCEDURE — 83036 HEMOGLOBIN GLYCOSYLATED A1C: CPT

## 2024-09-25 PROCEDURE — 83525 ASSAY OF INSULIN: CPT

## 2024-09-27 ENCOUNTER — TELEPHONE (OUTPATIENT)
Dept: FAMILY MEDICINE CLINIC | Facility: CLINIC | Age: 69
End: 2024-09-27

## 2024-09-27 NOTE — TELEPHONE ENCOUNTER
Spoke with pt and gave medicare phone number for hewr to call to let them know the [employer] cross is not a [facility] replacement policy, It is through work and is the primary ins. Her B/C is secondary.  Pt said she would call

## 2024-09-30 ENCOUNTER — OFFICE VISIT (OUTPATIENT)
Dept: FAMILY MEDICINE CLINIC | Facility: CLINIC | Age: 69
End: 2024-09-30
Payer: COMMERCIAL

## 2024-09-30 VITALS
HEIGHT: 67 IN | DIASTOLIC BLOOD PRESSURE: 92 MMHG | TEMPERATURE: 97.8 F | OXYGEN SATURATION: 97 % | WEIGHT: 275.8 LBS | BODY MASS INDEX: 43.29 KG/M2 | RESPIRATION RATE: 16 BRPM | HEART RATE: 92 BPM | SYSTOLIC BLOOD PRESSURE: 120 MMHG

## 2024-09-30 DIAGNOSIS — E78.00 PURE HYPERCHOLESTEROLEMIA: ICD-10-CM

## 2024-09-30 DIAGNOSIS — R73.9 HYPERGLYCEMIA: ICD-10-CM

## 2024-09-30 DIAGNOSIS — Z23 ENCOUNTER FOR IMMUNIZATION: ICD-10-CM

## 2024-09-30 DIAGNOSIS — Z00.00 ENCOUNTER FOR ANNUAL PHYSICAL EXAM: Primary | ICD-10-CM

## 2024-09-30 DIAGNOSIS — E03.9 ACQUIRED HYPOTHYROIDISM: ICD-10-CM

## 2024-09-30 DIAGNOSIS — E53.8 VITAMIN B12 DEFICIENCY: ICD-10-CM

## 2024-09-30 DIAGNOSIS — I10 ESSENTIAL HYPERTENSION: ICD-10-CM

## 2024-09-30 DIAGNOSIS — D64.9 ANEMIA, UNSPECIFIED TYPE: ICD-10-CM

## 2024-09-30 DIAGNOSIS — I49.5 SICK SINUS SYNDROME (HCC): ICD-10-CM

## 2024-09-30 DIAGNOSIS — R63.5 WEIGHT GAIN: ICD-10-CM

## 2024-09-30 DIAGNOSIS — E55.9 VITAMIN D DEFICIENCY: ICD-10-CM

## 2024-09-30 DIAGNOSIS — I82.5Z2 CHRONIC DEEP VEIN THROMBOSIS (DVT) OF DISTAL VEIN OF LEFT LOWER EXTREMITY (HCC): ICD-10-CM

## 2024-09-30 PROCEDURE — 99397 PER PM REEVAL EST PAT 65+ YR: CPT | Performed by: NURSE PRACTITIONER

## 2024-09-30 PROCEDURE — 99213 OFFICE O/P EST LOW 20 MIN: CPT | Performed by: NURSE PRACTITIONER

## 2024-09-30 RX ORDER — LANOLIN ALCOHOL/MO/W.PET/CERES
1000 CREAM (GRAM) TOPICAL DAILY
Qty: 90 TABLET | Refills: 1 | Status: SHIPPED | OUTPATIENT
Start: 2024-09-30

## 2024-09-30 RX ORDER — LEVOTHYROXINE SODIUM 150 UG/1
150 TABLET ORAL DAILY
Qty: 90 TABLET | Refills: 1 | Status: SHIPPED | OUTPATIENT
Start: 2024-09-30

## 2024-09-30 RX ORDER — METOPROLOL SUCCINATE 25 MG/1
25 TABLET, EXTENDED RELEASE ORAL DAILY
Qty: 90 TABLET | Refills: 1 | Status: SHIPPED | OUTPATIENT
Start: 2024-09-30

## 2024-09-30 RX ORDER — CHLORAL HYDRATE 500 MG
2000 CAPSULE ORAL 2 TIMES DAILY
Qty: 120 CAPSULE | Refills: 5 | Status: SHIPPED | OUTPATIENT
Start: 2024-09-30

## 2024-09-30 NOTE — PROGRESS NOTES
Adult Annual Physical  Name: Natividad Reis      : 1955      MRN: [id number]  Encounter Provider: KELLIE Duong  Encounter Date: 2024   Encounter department: [facility]    Assessment & Plan  Encounter for annual physical exam         Encounter for immunization         Acquired hypothyroidism    Orders:    TSH, 3rd generation; Future    levothyroxine 150 mcg tablet; Take 1 tablet (150 mcg total) by mouth daily    Essential hypertension    Orders:    TSH, 3rd generation; Future    metoprolol succinate (TOPROL-XL) 25 mg 24 hr tablet; Take 1 tablet (25 mg total) by mouth daily    High sensitivity CRP; Future    Sick sinus syndrome (HCC)    Orders:    TSH, 3rd generation; Future    metoprolol succinate (TOPROL-XL) 25 mg 24 hr tablet; Take 1 tablet (25 mg total) by mouth daily    High sensitivity CRP; Future    Vitamin B12 deficiency    Orders:    vitamin B-12 (VITAMIN B-12) 1,000 mcg tablet; Take 1 tablet (1,000 mcg total) by mouth daily    Vitamin B12; Future    Vitamin D deficiency    Orders:    Vitamin D 25 hydroxy; Future    Pure hypercholesterolemia    Orders:    Berberine Chloride 500 MG CAPS; Take 1,000 mg by mouth 2 (two) times a day    Omega-3 Fatty Acids (fish oil) 1,000 mg; Take 2 capsules (2,000 mg total) by mouth 2 (two) times a day    psyllium (METAMUCIL) 58.6 % packet; Take 1 packet by mouth daily    Lipid panel; Future    High sensitivity CRP; Future    Chronic deep vein thrombosis (DVT) of distal vein of left lower extremity (HCC)         Hyperglycemia    Orders:    Comprehensive metabolic panel; Future    Hemoglobin A1C; Future    Insulin, fasting; Future    High sensitivity CRP; Future    Anemia, unspecified type    Orders:    CBC and differential; Future    Weight gain    Orders:    DHEA-sulfate; Future    Immunizations and preventive care screenings were discussed with patient today. Appropriate education was printed on patient's after visit  summary.    Counseling:  Dental Health: discussed importance of regular tooth brushing, flossing, and dental visits.  Injury prevention: discussed safety/seat belts, safety helmets, smoke detectors, carbon monoxide detectors, and smoking near bedding or upholstery.  Exercise: the importance of regular exercise/physical activity was discussed. Recommend exercise 3-5 times per week for at least 30 minutes.       Depression Screening and Follow-up Plan: Patient was screened for depression during today's encounter. They screened negative with a PHQ-2 score of 0.        History of Present Illness     Adult Annual Physical:  Patient presents for annual physical. Patient presents to office for annual physical exam. Complete medical history and medications reviewed with patient and tolerating all medications well without any problems. Vital signs reviewed and stable. Lab results reviewed with patient. Continues to be followed by [facility] for Hx Sick Sinus Syndrome and HTN.  Continues to be followed by ENT Dr. Ovalle for management of B/L impaired hearing requiring use of hearing aids.  Continues to be followed by Dr. Stern for annual gynecological exam.  Discussed elevated cholesterol results with patient and patient is refusing to take any statins or any medications for this only willing to try natural supplements and not prescriptive medications.  Patient has upcoming appointment scheduled with Cardiology in Nov. 2024.  Patient requesting a HS CRP and DHEAS to be ordered due to her weight gain in abdomen and her Hx of Hypercholesterolemia.  Denies any other problems or concerns at the present time. .     Diet and Physical Activity:  - Diet/Nutrition: well balanced diet and heart healthy (low sodium) diet.  - Exercise: walking.    Depression Screening:  - PHQ-2 Score: 0    General Health:  - Sleep: 4-6 hours of sleep on average and sleeps well.  - Hearing: decreased hearing left ear, decreased hearing right ear  and requires use of hearing aids. Using hearing aids B/L  - Vision: goes for regular eye exams and wears glasses.  - Dental: brushes teeth twice daily and regular dental visits.    /GYN Health:  - Follows with GYN: yes.   - Menopause: postmenopausal.   - History of STDs: no  - Contraception:. Postmenopausal      Advanced Care Planning:  - Has an advanced directive?: no    - Has a durable medical POA?: no    - ACP document given to patient?: no      Review of Systems  GENERAL:  Feels well, denies any significant changes in weight without trying.  SKIN:  Denies rashes, lesions, opened areas, wounds, change in moles or any other skin changes.  HEENT:  Denies any head injury or headaches.  Negative blurred vision, floaters, spots before eyes, infections, or other vision problems.  Negative significant changes in vision or hearing.  Negative tinnitus, vertigo, or infections.  Negative hay fever, sinus trouble, nasal discharge, bloody noses, or problems with smell.  Negative sore throat, bleeding gums, ulcers, or sores.   NECK:  Denies lumps, goiter, pain, swollen glands, or lymphadenopathy.  Continues with left parotid gland mass.  BREASTS:  Denies lumps, pain, nipple discharge, swelling, redness, or any other changes.  RESPIRATORY:  Denies cough, wheezing, shortness of breath, dyspnea, or orthopnea.  CARDIOVASCULAR:  Denies chest pain or palpitations.   GASTROINTESTINAL:  Appetite good, denies nausea, vomiting, or indigestion.  Bowel movements normal occurring about once daily or every other day.  URINARY:  Denies frequency, urgency, incontinence, dysuria, hematuria, nocturia, or recent flank pain.   GENITAL:  Denies vaginal discharge, pelvic infection, lesions, ulcers, or pain.   Negative dyspareunia or abnormal vaginal bleeding.  PERIPHERAL VASCULAR:  Denies varicosities, swelling, skin changes, or pain.  MUSCULOSKELETAL:  Denies back, joint, or muscle pain.    Negative problems with mobility or movement.    PSYCHIATRIC:  Denies problems with depression, anxiety, anger, or other psychiatric symptoms.  NEUROLOGIC:  Denies fainting, dizziness, memory problems, seizures, tingling, motor or sensory loss.   HEMATOLOGIC:  Denies easy bruising, bleeding, or anemia.  ENDOCRINE:  Denies thyroid problems, temperature intolerance, excessive sweating, or other endocrine symptoms.    Pertinent Medical History   Medical History Reviewed by provider this encounter:  Tobacco       Current Outpatient Medications on File Prior to Visit   Medication Sig Dispense Refill    acetaminophen (TYLENOL) 325 mg tablet Take 2 tablets (650 mg total) by mouth every 6 (six) hours as needed for mild pain, headaches or fever  0    ascorbic acid (VITAMIN C) 250 mg tablet Take 500 mg by mouth daily      cholecalciferol (VITAMIN D3) 1,000 units tablet Take 2 tablets (2,000 Units total) by mouth daily 90 tablet 1    fluticasone (FLONASE) 50 mcg/act nasal spray 1 spray each nostril 2 x daily x 14 days then prn congestion 18.2 mL 3    Garlic 100 MG TABS Take by mouth      ketoconazole (NIZORAL) 2 % cream Apply topically as needed for itching 60 g 5    loratadine (CLARITIN) 10 mg tablet 1 tab po daily x 14 days then prn allergies 30 tablet 5    Zinc 25 MG TABS Take 1 tablet by mouth daily      [DISCONTINUED] levothyroxine 150 mcg tablet Take 1 tablet (150 mcg total) by mouth daily 90 tablet 1    [DISCONTINUED] metoprolol succinate (TOPROL-XL) 25 mg 24 hr tablet Take 25 mg by mouth daily      [DISCONTINUED] vitamin B-12 (VITAMIN B-12) 1,000 mcg tablet Take 1 tablet (1,000 mcg total) by mouth 3 (three) times a week 90 tablet 1     No current facility-administered medications on file prior to visit.      Social History     Tobacco Use    Smoking status: Every Day     Current packs/day: 0.25     Average packs/day: 0.3 packs/day for 44.0 years (11.0 ttl pk-yrs)     Types: Cigarettes    Smokeless tobacco: Never    Tobacco comments:     8/25/23-smoke 5 cigarettes  "daily.    Vaping Use    Vaping status: Never Used   Substance and Sexual Activity    Alcohol use: No    Drug use: No    Sexual activity: Not Currently       Objective     /92 (BP Location: Left arm, Patient Position: Sitting)   Pulse 92   Temp 97.8 °F (36.6 °C) (Tympanic)   Resp 16   Ht 5' 7\" (1.702 m)   Wt 125 kg (275 lb 12.8 oz)   LMP 01/01/2004   SpO2 97%   BMI 43.20 kg/m²     Physical Exam  Vitals and nursing note reviewed.     GENERAL:  Appears well nourished, well groomed, in no acute distress.  SKIN:  Palms warm, dry, color good.  Nails without clubbing or cyanosis.    No lesions, ulcerations, or wounds.  HEAD:  Hair is average texture.  Scalp without lesions, normocephalic, and atraumatic.  EYES:  Visual fields full by confrontation.  Conjunctiva pink, sclera white, PERRLA.  Extraocular movements intact.    EARS:  B/L ear canals clear.  B/L TMs clear with + light reflex.    Acuity good to whispered voice.   NOSE: Mucosa pink, moist, septum midline.  Negative sinus tenderness.   B/L turbinates pink, moist, non-edematous without exudate.   MOUTH:  Oral mucosa pink.  Pharynx pink, moist, without swelling, redness, or exudate.  Dentition ok.  Tongue midline.   NECK:  Supple, trachea midline, Negative thyromegaly, Left parotid gland mass soft mobile non-tender. No lymphadenopathy, or swollen glands.  LYMPH NODES:  Negative enlargement of neck, axillary, epitrochlear, or inguinal nodes.  THORAX/LUNGS  Thorax symmetric with good excursion.   Lungs resonant.  Breath sounds vesicular with no added sounds.    Diaphragm descends within normal limits.   CARDIOVASCULAR:  Carotid upstrokes brisk and without bruits.   Apical impulse discrete and tapping, barely palpable in the 5th ICS/MCL.    Normal S1 and Normal S2, Negative S3 or S4.    Negative murmurs, thrills, lifts, or heaves.  ABDOMEN:  Protuberant, bowel sounds normal active x 4 quadrants.    Negative tenderness.  Negative masses.  Negative " hepatomegaly.  Negative splenomegaly.  Negative costovertebral tenderness.  EXTREMITIES:  Warm, calves supple, non-tender, negative for edema.    Negative stasis pigmentation or ulcers.  +2 pulses throughout.  MUSCULOSKELETAL:  Negative joint deformities.    Good range of motion in hands, wrists, elbows, shoulders, spine, hips, knees, and ankles.  NEUROLOGICAL:  Mental status:  Awake, alert, and oriented to person, place, time, and event. Normal thought processes.        Administrative Statements   I have spent a total time of 20 minutes in caring for this patient on the day of the visit/encounter including Risks and benefits of tx options, Instructions for management, Importance of tx compliance, Risk factor reductions, Counseling / Coordination of care, Documenting in the medical record, Reviewing / ordering tests, medicine, procedures  , and Obtaining or reviewing history  .

## 2024-09-30 NOTE — ASSESSMENT & PLAN NOTE
Orders:    TSH, 3rd generation; Future    metoprolol succinate (TOPROL-XL) 25 mg 24 hr tablet; Take 1 tablet (25 mg total) by mouth daily    High sensitivity CRP; Future

## 2024-09-30 NOTE — ASSESSMENT & PLAN NOTE
Orders:    Berberine Chloride 500 MG CAPS; Take 1,000 mg by mouth 2 (two) times a day    Omega-3 Fatty Acids (fish oil) 1,000 mg; Take 2 capsules (2,000 mg total) by mouth 2 (two) times a day    psyllium (METAMUCIL) 58.6 % packet; Take 1 packet by mouth daily    Lipid panel; Future    High sensitivity CRP; Future

## 2024-09-30 NOTE — ASSESSMENT & PLAN NOTE
Orders:    vitamin B-12 (VITAMIN B-12) 1,000 mcg tablet; Take 1 tablet (1,000 mcg total) by mouth daily    Vitamin B12; Future

## 2024-09-30 NOTE — ASSESSMENT & PLAN NOTE
Orders:    TSH, 3rd generation; Future    levothyroxine 150 mcg tablet; Take 1 tablet (150 mcg total) by mouth daily

## 2024-09-30 NOTE — PATIENT INSTRUCTIONS
"Patient Education     Routine physical for adults   The Basics   Written by the doctors and editors at [facility]   What is a physical? -- A physical is a routine visit, or \"check-up,\" with your doctor. You might also hear it called a \"wellness visit\" or \"preventive visit.\"  During each visit, the doctor will:   Ask about your physical and mental health   Ask about your habits, behaviors, and lifestyle   Do an exam   Give you vaccines if needed   Talk to you about any medicines you take   Give advice about your health   Answer your questions  Getting regular check-ups is an important part of taking care of your health. It can help your doctor find and treat any problems you have. But it's also important for preventing health problems.  A routine physical is different from a \"sick visit.\" A sick visit is when you see a doctor because of a health concern or problem. Since physicals are scheduled ahead of time, you can think about what you want to ask the doctor.  How often should I get a physical? -- It depends on your age and health. In general, for people age 21 years and older:   If you are younger than 50 years, you might be able to get a physical every 3 years.   If you are 50 years or older, your doctor might recommend a physical every year.  If you have an ongoing health condition, like diabetes or high blood pressure, your doctor will probably want to see you more often.  What happens during a physical? -- In general, each visit will include:   Physical exam - The doctor or nurse will check your height, weight, heart rate, and blood pressure. They will also look at your eyes and ears. They will ask about how you are feeling and whether you have any symptoms that bother you.   Medicines - It's a good idea to bring a list of all the medicines you take to each doctor visit. Your doctor will talk to you about your medicines and answer any questions. Tell them if you are having any side effects that bother you. You " "should also tell them if you are having trouble paying for any of your medicines.   Habits and behaviors - This includes:   Your diet   Your exercise habits   Whether you smoke, drink alcohol, or use drugs   Whether you are sexually active   Whether you feel safe at home  Your doctor will talk to you about things you can do to improve your health and lower your risk of health problems. They will also offer help and support. For example, if you want to quit smoking, they can give you advice and might prescribe medicines. If you want to improve your diet or get more physical activity, they can help you with this, too.   Lab tests, if needed - The tests you get will depend on your age and situation. For example, your doctor might want to check your:   Cholesterol   Blood sugar   Iron level   Vaccines - The recommended vaccines will depend on your age, health, and what vaccines you already had. Vaccines are very important because they can prevent certain serious or deadly infections.   Discussion of screening - \"Screening\" means checking for diseases or other health problems before they cause symptoms. Your doctor can recommend screening based on your age, risk, and preferences. This might include tests to check for:   Cancer, such as breast, prostate, cervical, ovarian, colorectal, prostate, lung, or skin cancer   Sexually transmitted infections, such as chlamydia and gonorrhea   Mental health conditions like depression and anxiety  Your doctor will talk to you about the different types of screening tests. They can help you decide which screenings to have. They can also explain what the results might mean.   Answering questions - The physical is a good time to ask the doctor or nurse questions about your health. If needed, they can refer you to other doctors or specialists, too.  Adults older than 65 years often need other care, too. As you get older, your doctor will talk to you about:   How to prevent falling at " home   Hearing or vision tests   Memory testing   How to take your medicines safely   Making sure that you have the help and support you need at home  All topics are updated as new evidence becomes available and our peer review process is complete.  This topic retrieved from [employer] on: May 02, 2024.  Topic 239966 Version 1.0  Release: 32.4.3 - C32.122  © 2024 UpToDate, Inc. and/or its affiliates. All rights reserved.  Consumer Information Use and Disclaimer   Disclaimer: This generalized information is a limited summary of diagnosis, treatment, and/or medication information. It is not meant to be comprehensive and should be used as a tool to help the user understand and/or assess potential diagnostic and treatment options. It does NOT include all information about conditions, treatments, medications, side effects, or risks that may apply to a specific patient. It is not intended to be medical advice or a substitute for the medical advice, diagnosis, or treatment of a health care provider based on the health care provider's examination and assessment of a patient's specific and unique circumstances. Patients must speak with a health care provider for complete information about their health, medical questions, and treatment options, including any risks or benefits regarding use of medications. This information does not endorse any treatments or medications as safe, effective, or approved for treating a specific patient. UpToDate, Inc. and its affiliates disclaim any warranty or liability relating to this information or the use thereof.The use of this information is governed by the Terms of Use, available at https://www.woltersBrainMassuwer.com/en/know/clinical-effectiveness-terms. 2024© UpToDate, Inc. and its affiliates and/or licensors. All rights reserved.  Copyright   © 2024 UpToDate, Inc. and/or its affiliates. All rights reserved.

## 2025-01-27 ENCOUNTER — TELEPHONE (OUTPATIENT)
Dept: FAMILY MEDICINE CLINIC | Facility: CLINIC | Age: 70
End: 2025-01-27

## 2025-01-27 DIAGNOSIS — M62.830 MUSCLE SPASM OF BACK: Primary | ICD-10-CM

## 2025-01-27 DIAGNOSIS — I49.5 SICK SINUS SYNDROME (HCC): ICD-10-CM

## 2025-01-27 DIAGNOSIS — I10 ESSENTIAL HYPERTENSION: ICD-10-CM

## 2025-01-27 RX ORDER — METOPROLOL SUCCINATE 25 MG/1
25 TABLET, EXTENDED RELEASE ORAL DAILY
Qty: 90 TABLET | Refills: 1 | Status: SHIPPED | OUTPATIENT
Start: 2025-01-27

## 2025-01-27 NOTE — TELEPHONE ENCOUNTER
metoprolol succinate (TOPROL-XL) 25 mg 24 hr tablet- Take 1 tablet (25 mg total) by mouth daily    [id number] - [place], [place] - [address] [facility]    Authorizing Provider:  KELLIE Duong- [place] [facility] [place] [facility]     Needs HP     89 w 1 was sent on 9/30/24 pharmacy is insisting they never received script       Pt is also requesting for the Bianca HOPKINS   To provided a medication for back spasms   X last couple of days and today is the worse   [facility]

## 2025-03-24 ENCOUNTER — APPOINTMENT (OUTPATIENT)
Dept: LAB | Facility: CLINIC | Age: 70
End: 2025-03-24
Payer: COMMERCIAL

## 2025-03-24 ENCOUNTER — RESULTS FOLLOW-UP (OUTPATIENT)
Dept: FAMILY MEDICINE CLINIC | Facility: CLINIC | Age: 70
End: 2025-03-24

## 2025-03-24 DIAGNOSIS — E53.8 VITAMIN B12 DEFICIENCY: ICD-10-CM

## 2025-03-24 DIAGNOSIS — E55.9 VITAMIN D DEFICIENCY: ICD-10-CM

## 2025-03-24 DIAGNOSIS — D64.9 ANEMIA, UNSPECIFIED TYPE: ICD-10-CM

## 2025-03-24 DIAGNOSIS — R73.9 HYPERGLYCEMIA: ICD-10-CM

## 2025-03-24 DIAGNOSIS — E78.00 PURE HYPERCHOLESTEROLEMIA: ICD-10-CM

## 2025-03-24 DIAGNOSIS — I49.5 SICK SINUS SYNDROME (HCC): ICD-10-CM

## 2025-03-24 DIAGNOSIS — E03.9 ACQUIRED HYPOTHYROIDISM: ICD-10-CM

## 2025-03-24 DIAGNOSIS — R63.5 WEIGHT GAIN: ICD-10-CM

## 2025-03-24 DIAGNOSIS — I10 ESSENTIAL HYPERTENSION: ICD-10-CM

## 2025-03-24 LAB
25(OH)D3 SERPL-MCNC: 42.5 NG/ML (ref 30–100)
ALBUMIN SERPL BCG-MCNC: 4.2 G/DL (ref 3.5–5)
ALP SERPL-CCNC: 74 U/L (ref 34–104)
ALT SERPL W P-5'-P-CCNC: 20 U/L (ref 7–52)
ANION GAP SERPL CALCULATED.3IONS-SCNC: 8 MMOL/L (ref 4–13)
AST SERPL W P-5'-P-CCNC: 18 U/L (ref 13–39)
BASOPHILS # BLD AUTO: 0.06 THOUSANDS/ÂΜL (ref 0–0.1)
BASOPHILS NFR BLD AUTO: 1 % (ref 0–1)
BILIRUB SERPL-MCNC: 0.47 MG/DL (ref 0.2–1)
BUN SERPL-MCNC: 13 MG/DL (ref 5–25)
CALCIUM SERPL-MCNC: 9.8 MG/DL (ref 8.4–10.2)
CHLORIDE SERPL-SCNC: 102 MMOL/L (ref 96–108)
CHOLEST SERPL-MCNC: 296 MG/DL (ref ?–200)
CO2 SERPL-SCNC: 29 MMOL/L (ref 21–32)
CREAT SERPL-MCNC: 0.87 MG/DL (ref 0.6–1.3)
CRP SERPL HS-MCNC: 3.69 MG/L
EOSINOPHIL # BLD AUTO: 0.15 THOUSAND/ÂΜL (ref 0–0.61)
EOSINOPHIL NFR BLD AUTO: 2 % (ref 0–6)
ERYTHROCYTE [DISTWIDTH] IN BLOOD BY AUTOMATED COUNT: 14.1 % (ref 11.6–15.1)
EST. AVERAGE GLUCOSE BLD GHB EST-MCNC: 114 MG/DL
GFR SERPL CREATININE-BSD FRML MDRD: 67 ML/MIN/1.73SQ M
GLUCOSE P FAST SERPL-MCNC: 104 MG/DL (ref 65–99)
HBA1C MFR BLD: 5.6 %
HCT VFR BLD AUTO: 49.2 % (ref 34.8–46.1)
HDLC SERPL-MCNC: 44 MG/DL
HGB BLD-MCNC: 15.6 G/DL (ref 11.5–15.4)
IMM GRANULOCYTES # BLD AUTO: 0.03 THOUSAND/UL (ref 0–0.2)
IMM GRANULOCYTES NFR BLD AUTO: 0 % (ref 0–2)
INSULIN SERPL-ACNC: 16.82 UIU/ML (ref 1.9–23)
LDLC SERPL CALC-MCNC: 209 MG/DL (ref 0–100)
LYMPHOCYTES # BLD AUTO: 2.72 THOUSANDS/ÂΜL (ref 0.6–4.47)
LYMPHOCYTES NFR BLD AUTO: 32 % (ref 14–44)
MCH RBC QN AUTO: 31 PG (ref 26.8–34.3)
MCHC RBC AUTO-ENTMCNC: 31.7 G/DL (ref 31.4–37.4)
MCV RBC AUTO: 98 FL (ref 82–98)
MONOCYTES # BLD AUTO: 0.66 THOUSAND/ÂΜL (ref 0.17–1.22)
MONOCYTES NFR BLD AUTO: 8 % (ref 4–12)
NEUTROPHILS # BLD AUTO: 4.91 THOUSANDS/ÂΜL (ref 1.85–7.62)
NEUTS SEG NFR BLD AUTO: 57 % (ref 43–75)
NONHDLC SERPL-MCNC: 252 MG/DL
NRBC BLD AUTO-RTO: 0 /100 WBCS
PLATELET # BLD AUTO: 225 THOUSANDS/UL (ref 149–390)
PMV BLD AUTO: 10.7 FL (ref 8.9–12.7)
POTASSIUM SERPL-SCNC: 4.9 MMOL/L (ref 3.5–5.3)
PROT SERPL-MCNC: 7.6 G/DL (ref 6.4–8.4)
RBC # BLD AUTO: 5.03 MILLION/UL (ref 3.81–5.12)
SODIUM SERPL-SCNC: 139 MMOL/L (ref 135–147)
TRIGL SERPL-MCNC: 213 MG/DL (ref ?–150)
TSH SERPL DL<=0.05 MIU/L-ACNC: 3.01 UIU/ML (ref 0.45–4.5)
VIT B12 SERPL-MCNC: 586 PG/ML (ref 180–914)
WBC # BLD AUTO: 8.53 THOUSAND/UL (ref 4.31–10.16)

## 2025-03-24 PROCEDURE — 36415 COLL VENOUS BLD VENIPUNCTURE: CPT

## 2025-03-24 PROCEDURE — 80053 COMPREHEN METABOLIC PANEL: CPT

## 2025-03-24 PROCEDURE — 83525 ASSAY OF INSULIN: CPT

## 2025-03-24 PROCEDURE — 85025 COMPLETE CBC W/AUTO DIFF WBC: CPT

## 2025-03-24 PROCEDURE — 80061 LIPID PANEL: CPT

## 2025-03-24 PROCEDURE — 82607 VITAMIN B-12: CPT

## 2025-03-24 PROCEDURE — 83036 HEMOGLOBIN GLYCOSYLATED A1C: CPT

## 2025-03-24 PROCEDURE — 82627 DEHYDROEPIANDROSTERONE: CPT

## 2025-03-24 PROCEDURE — 82306 VITAMIN D 25 HYDROXY: CPT

## 2025-03-24 PROCEDURE — 86141 C-REACTIVE PROTEIN HS: CPT

## 2025-03-24 PROCEDURE — 84443 ASSAY THYROID STIM HORMONE: CPT

## 2025-03-26 LAB — DHEA-S SERPL-MCNC: 93.4 UG/DL (ref 20.4–186.6)

## 2025-03-31 ENCOUNTER — OFFICE VISIT (OUTPATIENT)
Dept: FAMILY MEDICINE CLINIC | Facility: CLINIC | Age: 70
End: 2025-03-31
Payer: COMMERCIAL

## 2025-03-31 ENCOUNTER — TELEPHONE (OUTPATIENT)
Age: 70
End: 2025-03-31

## 2025-03-31 ENCOUNTER — APPOINTMENT (OUTPATIENT)
Dept: RADIOLOGY | Facility: CLINIC | Age: 70
End: 2025-03-31
Payer: COMMERCIAL

## 2025-03-31 VITALS
DIASTOLIC BLOOD PRESSURE: 75 MMHG | OXYGEN SATURATION: 98 % | RESPIRATION RATE: 16 BRPM | BODY MASS INDEX: 45.14 KG/M2 | TEMPERATURE: 97.8 F | HEIGHT: 67 IN | HEART RATE: 87 BPM | WEIGHT: 287.6 LBS | SYSTOLIC BLOOD PRESSURE: 140 MMHG

## 2025-03-31 DIAGNOSIS — M72.2 PLANTAR FASCIITIS, LEFT: ICD-10-CM

## 2025-03-31 DIAGNOSIS — M79.641 PAIN IN BOTH HANDS: ICD-10-CM

## 2025-03-31 DIAGNOSIS — L73.9 FOLLICULITIS: ICD-10-CM

## 2025-03-31 DIAGNOSIS — E55.9 VITAMIN D DEFICIENCY: ICD-10-CM

## 2025-03-31 DIAGNOSIS — I82.5Z2 CHRONIC DEEP VEIN THROMBOSIS (DVT) OF DISTAL VEIN OF LEFT LOWER EXTREMITY (HCC): ICD-10-CM

## 2025-03-31 DIAGNOSIS — E01.0 THYROMEGALY: ICD-10-CM

## 2025-03-31 DIAGNOSIS — M24.849 LOCKING FINGER JOINT: ICD-10-CM

## 2025-03-31 DIAGNOSIS — Z12.31 ENCOUNTER FOR SCREENING MAMMOGRAM FOR BREAST CANCER: ICD-10-CM

## 2025-03-31 DIAGNOSIS — E53.8 VITAMIN B12 DEFICIENCY: ICD-10-CM

## 2025-03-31 DIAGNOSIS — E03.9 ACQUIRED HYPOTHYROIDISM: ICD-10-CM

## 2025-03-31 DIAGNOSIS — R73.9 HYPERGLYCEMIA: ICD-10-CM

## 2025-03-31 DIAGNOSIS — Z72.0 TOBACCO USE: ICD-10-CM

## 2025-03-31 DIAGNOSIS — D64.9 ANEMIA, UNSPECIFIED TYPE: ICD-10-CM

## 2025-03-31 DIAGNOSIS — I49.5 SICK SINUS SYNDROME (HCC): Primary | ICD-10-CM

## 2025-03-31 DIAGNOSIS — E78.00 PURE HYPERCHOLESTEROLEMIA: ICD-10-CM

## 2025-03-31 DIAGNOSIS — I10 ESSENTIAL HYPERTENSION: ICD-10-CM

## 2025-03-31 DIAGNOSIS — K11.8 MASS OF LEFT PAROTID GLAND: ICD-10-CM

## 2025-03-31 DIAGNOSIS — M79.642 PAIN IN BOTH HANDS: ICD-10-CM

## 2025-03-31 DIAGNOSIS — E66.01 MORBID OBESITY WITH BMI OF 40.0-44.9, ADULT (HCC): ICD-10-CM

## 2025-03-31 PROCEDURE — 73130 X-RAY EXAM OF HAND: CPT

## 2025-03-31 PROCEDURE — 99214 OFFICE O/P EST MOD 30 MIN: CPT | Performed by: NURSE PRACTITIONER

## 2025-03-31 PROCEDURE — 73630 X-RAY EXAM OF FOOT: CPT

## 2025-03-31 RX ORDER — LANOLIN ALCOHOL/MO/W.PET/CERES
1000 CREAM (GRAM) TOPICAL DAILY
Qty: 90 TABLET | Refills: 1 | Status: SHIPPED | OUTPATIENT
Start: 2025-03-31

## 2025-03-31 RX ORDER — GEMFIBROZIL 600 MG/1
600 TABLET, FILM COATED ORAL
Qty: 180 TABLET | Refills: 2 | Status: SHIPPED | OUTPATIENT
Start: 2025-03-31

## 2025-03-31 RX ORDER — BACILLUS COAGULANS/INULIN 1B-250 MG
1 CAPSULE ORAL DAILY
Qty: 100 CAPSULE | Refills: 1 | Status: SHIPPED | OUTPATIENT
Start: 2025-03-31

## 2025-03-31 RX ORDER — MUPIROCIN 20 MG/G
OINTMENT TOPICAL
Qty: 30 G | Refills: 1 | Status: SHIPPED | OUTPATIENT
Start: 2025-03-31

## 2025-03-31 RX ORDER — LEVOTHYROXINE SODIUM 150 UG/1
150 TABLET ORAL DAILY
Qty: 90 TABLET | Refills: 1 | Status: SHIPPED | OUTPATIENT
Start: 2025-03-31

## 2025-03-31 RX ORDER — TRIAMCINOLONE ACETONIDE 5 MG/G
CREAM TOPICAL
Qty: 454 G | Refills: 5 | Status: SHIPPED | OUTPATIENT
Start: 2025-03-31

## 2025-03-31 RX ORDER — METHYLPREDNISOLONE 4 MG/1
TABLET ORAL
Qty: 21 EACH | Refills: 1 | Status: SHIPPED | OUTPATIENT
Start: 2025-03-31

## 2025-03-31 RX ORDER — CLINDAMYCIN HYDROCHLORIDE 300 MG/1
300 CAPSULE ORAL 4 TIMES DAILY
Qty: 40 CAPSULE | Refills: 1 | Status: SHIPPED | OUTPATIENT
Start: 2025-03-31 | End: 2025-04-10

## 2025-03-31 NOTE — ASSESSMENT & PLAN NOTE
Orders:    levothyroxine 150 mcg tablet; Take 1 tablet (150 mcg total) by mouth daily    TSH, 3rd generation; Future

## 2025-03-31 NOTE — PROGRESS NOTES
Name: Natividad Reis      : 1955      MRN: [id number]  Encounter Provider: KELLIE Duong  Encounter Date: 3/31/2025   Encounter department: [facility]    Assessment & Plan  Sick sinus syndrome (HCC)         Morbid obesity with BMI of 40.0-44.9, adult (HCC)           Acquired hypothyroidism    Orders:    levothyroxine 150 mcg tablet; Take 1 tablet (150 mcg total) by mouth daily    TSH, 3rd generation; Future    Essential hypertension         Chronic deep vein thrombosis (DVT) of distal vein of left lower extremity (HCC)         Pure hypercholesterolemia    Orders:    gemfibrozil (LOPID) 600 mg tablet; Take 1 tablet (600 mg total) by mouth 2 (two) times a day before meals    Lipid panel; Future    Vitamin B12 deficiency    Orders:    vitamin B-12 (VITAMIN B-12) 1,000 mcg tablet; Take 1 tablet (1,000 mcg total) by mouth daily    Vitamin B12; Future    Encounter for screening mammogram for breast cancer    Orders:    Mammo screening bilateral w 3d and cad; Future    Vitamin D deficiency    Orders:    Vitamin D 25 hydroxy; Future    Locking finger joint    Orders:    XR hand 3+ vw right; Future    XR hand 3+ vw left; Future    methylPREDNISolone 4 MG tablet therapy pack; Use as directed on package    Pain in both hands    Orders:    XR hand 3+ vw right; Future    XR hand 3+ vw left; Future    methylPREDNISolone 4 MG tablet therapy pack; Use as directed on package    Plantar fasciitis, left    Orders:    XR foot 3+ vw left; Future    methylPREDNISolone 4 MG tablet therapy pack; Use as directed on package    Tobacco use    Orders:    CT lung screening program; Future    Anemia, unspecified type    Orders:    CBC and differential; Future    Hyperglycemia    Orders:    Comprehensive metabolic panel; Future    Hemoglobin A1C; Future    Insulin, fasting; Future    Thyromegaly    Orders:    US head neck soft tissue; Future    Mass of left parotid gland    Orders:    US head neck  soft tissue; Future    Folliculitis    Orders:    triamcinolone (KENALOG) 0.5 % cream; Apply 2 x daily to posterior scalp lesions    clindamycin (CLEOCIN) 300 MG capsule; Take 1 capsule (300 mg total) by mouth 4 (four) times a day for 10 days    Bacillus Coagulans-Inulin (Probiotic) 1-250 BILLION-MG CAPS; Take 1 capsule by mouth in the morning    mupirocin (BACTROBAN) 2 % ointment; Apply 2 x daily x 10 days to lesions      Depression Screening and Follow-up Plan: Patient was screened for depression during today's encounter. They screened negative with a PHQ-2 score of 0.      Tobacco Cessation Counseling: Tobacco cessation counseling was provided. The patient is sincerely urged to quit consumption of tobacco. She is not ready to quit tobacco. Medication options and side effects of medication discussed. Patient refused medication.     Lung Cancer Screening Shared Decision Making: I discussed with her that she is a candidate for lung cancer CT screening.     The following Shared Decision-Making points were covered:  Benefits of screening were discussed, including the rates of reduction in death from lung cancer and other causes.  Harms of screening were reviewed, including false positive tests, radiation exposure levels, risks of invasive procedures, risks of complications of screening, and risk of overdiagnosis.  I counseled on the importance of adherence to annual lung cancer LDCT screening, impact of co-morbidities, and ability or willingness to undergo diagnosis and treatment.  I counseled on the importance of maintaining abstinence as a former smoker or was counseled on the importance of smoking cessation if a current smoker    Review of Eligibility Criteria: She meets all of the criteria for Lung Cancer Screening.   - She is 70 y.o.   - She has 20 pack year tobacco history and is a current smoker or has quit within the past 15 years  - She presents no signs or symptoms of lung cancer    After discussion, the  patient decided to elect lung cancer screening.        History of Present Illness     Patient presents to office for follow up and recheck. Complete medical history and medications reviewed with patient and tolerating all medications well without any problems. Vital signs reviewed and stable. Lab results reviewed with patient.  [facility] for Hx Sick Sinus Syndrome and HTN.  C/O right ring finger locking up on her ongoing for several months.  C/O left 5th finger has been locked up for several years and unable to bend it completely.  Discussed treatment options with patient and patient would like to have X-Ray done and will try OTC Biofeeze.  C/O pain in B/L hands occurring.  C/O tender bumps of posterior head that occurs intermittently and had Dermatologist look at this but Dermatology did not feel it was anything.  C/O tender bumps above pubic area occurring for the past several weeks.  C/O left plantar pain occurring throughout the day ongoing for several weeks.  Patient never went back to ENT Dr. Ovalle to have Left Parotid Gland Mass removed.  Denies any problems or concerns at the present time.        Review of Systems  GENERAL:  Feels well, denies any significant changes in weight without trying.  SKIN:  C/O tender bumps of posterior head, above pubis, and left labial area.   HEENT:  Denies any head injury or headaches.  Negative blurred vision, floaters, spots before eyes, infections, or other vision problems.  Negative significant changes in vision or hearing.  Negative tinnitus, vertigo, or infections.  Negative hay fever, sinus trouble, nasal discharge, bloody noses, or problems with smell.  Negative sore throat, bleeding gums, ulcers, or sores.   NECK:  Denies lumps, goiter, pain, swollen glands, or lymphadenopathy.  BREASTS:  Denies lumps, pain, nipple discharge, swelling, redness, or any other changes.  RESPIRATORY:  Denies cough, wheezing, shortness of breath, dyspnea,  or orthopnea.  CARDIOVASCULAR:  Denies chest pain or palpitations.   GASTROINTESTINAL:  Appetite good, denies nausea, vomiting, or indigestion.  Bowel movements normal occurring about once daily or every other day.  URINARY:  Denies frequency, urgency, incontinence, dysuria, hematuria, nocturia, or recent flank pain.   GENITAL:  Denies vaginal discharge, pelvic infection, lesions, ulcers, or pain.   Negative dyspareunia or abnormal vaginal bleeding.  PERIPHERAL VASCULAR:  Denies varicosities, swelling, skin changes, or pain.  MUSCULOSKELETAL: C/O B/L hand pain, C/O right ring finger locking up intermittently and left 5th finger has been locked up for several years.   PSYCHIATRIC:  Denies problems with depression, anxiety, anger, or other psychiatric symptoms.  NEUROLOGIC:  Denies fainting, dizziness, memory problems, seizures, tingling, motor or sensory loss.   HEMATOLOGIC:  Denies easy bruising, bleeding, or anemia.  ENDOCRINE:  Denies thyroid problems, temperature intolerance, excessive sweating, or other endocrine symptoms.    Past Medical History:   Diagnosis Date    Allergic rhinitis     Carpal tunnel syndrome on both sides     Cervical disc herniation     DVT (deep venous thrombosis) (HCC)     DVT Left Leg    Fracture of foot     left casted    History of mammogram 2017    Being followed by Dr. Abraham    History of Papanicolaou smear of cervix 2016    Dr. Abraham    Hx of colonoscopy 2016    Dr. Kemp    Hyperlipemia     Hypertension     Hypothyroidism     Lichen sclerosus     Renal calculi     Rotator cuff tear, left      Past Surgical History:   Procedure Laterality Date    CARDIAC PACEMAKER PLACEMENT N/A 03/2023    CERVICAL LAMINECTOMY N/A     CHOLECYSTECTOMY N/A     KNEE CARTILAGE SURGERY Bilateral     LITHOTRIPSY N/A     NEUROPLASTY / TRANSPOSITION MEDIAN NERVE AT CARPAL TUNNEL BILATERAL Bilateral     ROTATOR CUFF REPAIR Left     TONSILLECTOMY AND ADENOIDECTOMY Bilateral     TONSILLECTOMY AND ADENOIDECTOMY       TUBAL LIGATION N/A     US GUIDED THYROID BIOPSY  11/2018    WRIST FRACTURE SURGERY Right     plates and pins      Family History   Problem Relation Age of Onset    Hypertension Mother     Hyperlipidemia Mother     Hypothyroidism Mother     Brain cancer Mother     Hypertension Father     Celiac disease Father     Ovarian cancer Sister     Celiac disease Brother     Hypothyroidism Brother     Diabetes Paternal Grandmother     Hypothyroidism Sister     Coronary artery disease Brother     Hypertension Brother     Hyperlipidemia Brother     Diabetes Sister      Social History     Tobacco Use    Smoking status: Some Days     Current packs/day: 0.25     Average packs/day: 0.3 packs/day for 44.0 years (11.0 ttl pk-yrs)     Types: Cigarettes    Smokeless tobacco: Never    Tobacco comments:     8/25/23-smoke 5 cigarettes daily.    Vaping Use    Vaping status: Never Used   Substance and Sexual Activity    Alcohol use: No    Drug use: No    Sexual activity: Not Currently     Current Outpatient Medications on File Prior to Visit   Medication Sig    acetaminophen (TYLENOL) 325 mg tablet Take 2 tablets (650 mg total) by mouth every 6 (six) hours as needed for mild pain, headaches or fever    ascorbic acid (VITAMIN C) 250 mg tablet Take 500 mg by mouth daily    cholecalciferol (VITAMIN D3) 1,000 units tablet Take 2 tablets (2,000 Units total) by mouth daily    fluticasone (FLONASE) 50 mcg/act nasal spray 1 spray each nostril 2 x daily x 14 days then prn congestion    Garlic 100 MG TABS Take by mouth    ketoconazole (NIZORAL) 2 % cream Apply topically as needed for itching    loratadine (CLARITIN) 10 mg tablet 1 tab po daily x 14 days then prn allergies    metoprolol succinate (TOPROL-XL) 25 mg 24 hr tablet Take 1 tablet (25 mg total) by mouth daily    Omega-3 Fatty Acids (fish oil) 1,000 mg Take 2 capsules (2,000 mg total) by mouth 2 (two) times a day    tiZANidine (ZANAFLEX) 4 mg tablet Take 1 tablet (4 mg total) by mouth every  "8 (eight) hours as needed for muscle spasms    Zinc 25 MG TABS Take 1 tablet by mouth daily    [DISCONTINUED] levothyroxine 150 mcg tablet Take 1 tablet (150 mcg total) by mouth daily    [DISCONTINUED] vitamin B-12 (VITAMIN B-12) 1,000 mcg tablet Take 1 tablet (1,000 mcg total) by mouth daily    Berberine Chloride 500 MG CAPS Take 1,000 mg by mouth 2 (two) times a day (Patient not taking: Reported on 3/31/2025)    psyllium (METAMUCIL) 58.6 % packet Take 1 packet by mouth daily (Patient not taking: Reported on 3/31/2025)     Allergies   Allergen Reactions    Atorvastatin Myalgia    Statins Other (See Comments)     Leg cramps     Immunization History   Administered Date(s) Administered    INFLUENZA 08/25/2017, 09/19/2018, 10/27/2020    Influenza, injectable, quadrivalent, preservative free 0.5 mL 09/19/2018    Pneumococcal Conjugate 13-Valent 10/27/2020    Pneumococcal Polysaccharide PPV23 01/02/2020    Tdap 11/20/2017    Zoster 01/08/2017    Zoster Vaccine Recombinant 10/03/2018, 10/03/2019     Objective   /75 (BP Location: Left arm, Patient Position: Sitting)   Pulse 87   Temp 97.8 °F (36.6 °C) (Tympanic)   Resp 16   Ht 5' 7\" (1.702 m)   Wt 130 kg (287 lb 9.6 oz)   LMP 01/01/2004   SpO2 98%   BMI 45.04 kg/m²     Physical Exam  Vitals and nursing note reviewed.       GENERAL:  Appears well nourished, well groomed, in no acute distress.  SKIN:  Folliculitis of posterior scalp and pubic area.   HEAD:  Hair is average texture.  Scalp without lesions, normocephalic, and atraumatic.  EYES:  Visual fields full by confrontation.  Conjunctiva pink, sclera white, PERRLA.  Extraocular movements intact.   EARS:  B/L ear canals clear.  B/L TMs clear with + light reflex.    NOSE: Mucosa pink, moist, septum midline.  Negative sinus tenderness.   B/L turbinates pink, moist, non-edematous without exudate.   MOUTH:  Oral mucosa pink.  Pharynx pink, moist, without swelling, redness, or exudate.  Dentition ok.  Tongue " midline.   NECK:  Supple, trachea midline, + thyromegaly, negative lymphadenopathy, or swollen glands. Enlarged left parotid gland.   LYMPH NODES:  Negative enlargement of neck, axillary, epitrochlear, or inguinal nodes.  THORAX/LUNGS  Thorax symmetric with good excursion.   Lungs resonant.  Breath sounds vesicular with no added sounds.    Diaphragm descends within normal limits.   CARDIOVASCULAR:  Carotid upstrokes brisk and without bruits.   Apical impulse discrete and tapping, barely palpable in the 5th ICS/MCL.  Left Pacemaker.   Normal S1 and Normal S2, Negative S3 or S4.    Negative murmurs, thrills, lifts, or heaves.  ABDOMEN:  Protuberant, bowel sounds normal active x 4 quadrants.    Negative tenderness.  Negative masses.  Negative hepatomegaly.  Negative splenomegaly.  Negative costovertebral tenderness.  EXTREMITIES:  Warm, calves supple, non-tender, negative for edema.    Negative stasis pigmentation or ulcers.  +2 pulses throughout.  MUSCULOSKELETAL:  Negative joint deformities.    Good range of motion in hands, wrists, elbows, shoulders, spine, hips, knees, and ankles.  NEUROLOGICAL:  Mental status:  Awake, alert, and oriented to person, place, time, and event. Normal thought processes.        Administrative Statements   I have spent time in caring for this patient on the day of the visit/encounter including Risks and benefits of tx options, Instructions for management, Importance of tx compliance, Risk factor reductions, Counseling / Coordination of care, Documenting in the medical record, and Obtaining or reviewing history  .

## 2025-03-31 NOTE — PATIENT INSTRUCTIONS
"Patient Education     Routine physical for adults   The Basics   Written by the doctors and editors at [facility]   What is a physical? -- A physical is a routine visit, or \"check-up,\" with your doctor. You might also hear it called a \"wellness visit\" or \"preventive visit.\"  During each visit, the doctor will:   Ask about your physical and mental health   Ask about your habits, behaviors, and lifestyle   Do an exam   Give you vaccines if needed   Talk to you about any medicines you take   Give advice about your health   Answer your questions  Getting regular check-ups is an important part of taking care of your health. It can help your doctor find and treat any problems you have. But it's also important for preventing health problems.  A routine physical is different from a \"sick visit.\" A sick visit is when you see a doctor because of a health concern or problem. Since physicals are scheduled ahead of time, you can think about what you want to ask the doctor.  How often should I get a physical? -- It depends on your age and health. In general, for people age 21 years and older:   If you are younger than 50 years, you might be able to get a physical every 3 years.   If you are 50 years or older, your doctor might recommend a physical every year.  If you have an ongoing health condition, like diabetes or high blood pressure, your doctor will probably want to see you more often.  What happens during a physical? -- In general, each visit will include:   Physical exam - The doctor or nurse will check your height, weight, heart rate, and blood pressure. They will also look at your eyes and ears. They will ask about how you are feeling and whether you have any symptoms that bother you.   Medicines - It's a good idea to bring a list of all the medicines you take to each doctor visit. Your doctor will talk to you about your medicines and answer any questions. Tell them if you are having any side effects that bother you. You " "should also tell them if you are having trouble paying for any of your medicines.   Habits and behaviors - This includes:   Your diet   Your exercise habits   Whether you smoke, drink alcohol, or use drugs   Whether you are sexually active   Whether you feel safe at home  Your doctor will talk to you about things you can do to improve your health and lower your risk of health problems. They will also offer help and support. For example, if you want to quit smoking, they can give you advice and might prescribe medicines. If you want to improve your diet or get more physical activity, they can help you with this, too.   Lab tests, if needed - The tests you get will depend on your age and situation. For example, your doctor might want to check your:   Cholesterol   Blood sugar   Iron level   Vaccines - The recommended vaccines will depend on your age, health, and what vaccines you already had. Vaccines are very important because they can prevent certain serious or deadly infections.   Discussion of screening - \"Screening\" means checking for diseases or other health problems before they cause symptoms. Your doctor can recommend screening based on your age, risk, and preferences. This might include tests to check for:   Cancer, such as breast, prostate, cervical, ovarian, colorectal, prostate, lung, or skin cancer   Sexually transmitted infections, such as chlamydia and gonorrhea   Mental health conditions like depression and anxiety  Your doctor will talk to you about the different types of screening tests. They can help you decide which screenings to have. They can also explain what the results might mean.   Answering questions - The physical is a good time to ask the doctor or nurse questions about your health. If needed, they can refer you to other doctors or specialists, too.  Adults older than 65 years often need other care, too. As you get older, your doctor will talk to you about:   How to prevent falling at " home   Hearing or vision tests   Memory testing   How to take your medicines safely   Making sure that you have the help and support you need at home  All topics are updated as new evidence becomes available and our peer review process is complete.  This topic retrieved from [employer] on: May 02, 2024.  Topic 507745 Version 1.0  Release: 32.4.3 - C32.122  © 2024 UpToDate, Inc. and/or its affiliates. All rights reserved.  Consumer Information Use and Disclaimer   Disclaimer: This generalized information is a limited summary of diagnosis, treatment, and/or medication information. It is not meant to be comprehensive and should be used as a tool to help the user understand and/or assess potential diagnostic and treatment options. It does NOT include all information about conditions, treatments, medications, side effects, or risks that may apply to a specific patient. It is not intended to be medical advice or a substitute for the medical advice, diagnosis, or treatment of a health care provider based on the health care provider's examination and assessment of a patient's specific and unique circumstances. Patients must speak with a health care provider for complete information about their health, medical questions, and treatment options, including any risks or benefits regarding use of medications. This information does not endorse any treatments or medications as safe, effective, or approved for treating a specific patient. UpToDate, Inc. and its affiliates disclaim any warranty or liability relating to this information or the use thereof.The use of this information is governed by the Terms of Use, available at https://www.[employer] Therapeuticsuwer.com/en/know/clinical-effectiveness-terms. 2024© UpToDate, Inc. and its affiliates and/or licensors. All rights reserved.  Copyright   © 2024 UpToDate, Inc. and/or its affiliates. All rights reserved.

## 2025-03-31 NOTE — TELEPHONE ENCOUNTER
PA for Probiotic) 1-250 BILLION-MG CAPS SUBMITTED to     via    []CMM-KEY:   [x]Surescripts-Case ID #   []Availity-Auth ID # NDC #   []Faxed to plan   []Other website   []Phone call Case ID #     [x]PA sent as URGENT    All office notes, labs and other pertaining documents and studies sent. Clinical questions answered. Awaiting determination from insurance company.     Turnaround time for your insurance to make a decision on your Prior Authorization can take 7-21 business days.

## 2025-03-31 NOTE — ASSESSMENT & PLAN NOTE
Orders:    gemfibrozil (LOPID) 600 mg tablet; Take 1 tablet (600 mg total) by mouth 2 (two) times a day before meals    Lipid panel; Future

## 2025-04-01 ENCOUNTER — TELEPHONE (OUTPATIENT)
Age: 70
End: 2025-04-01

## 2025-04-01 NOTE — TELEPHONE ENCOUNTER
PA for vitamin B-12 1,000mcg tablet SUBMITTED to [employer]    via    []CMM-KEY:   [x]Surescripts  []Availity-Auth ID # NDC #   []Faxed to plan   []Other website   []Phone call Case ID #     [x]PA sent as URGENT    All office notes, labs and other pertaining documents and studies sent. Clinical questions answered. Awaiting determination from insurance company.     Turnaround time for your insurance to make a decision on your Prior Authorization can take 7-21 business days.

## 2025-04-02 ENCOUNTER — RESULTS FOLLOW-UP (OUTPATIENT)
Dept: FAMILY MEDICINE CLINIC | Facility: CLINIC | Age: 70
End: 2025-04-02

## 2025-04-02 NOTE — TELEPHONE ENCOUNTER
PA for      (Probiotic) 1-250 BILLION-MG CAPS Take 1 capsule by mouth in the morning                  DENIED    Reason:(    Message sent to office clinical pool Yes    Denial letter scanned into Media Yes    Appeal started No (Provider will need to decide if appeal is warranted and send clinical documentation to Prior Authorization Team for initiation.)    **Please follow up with your patient regarding denial and next steps**

## 2025-04-02 NOTE — TELEPHONE ENCOUNTER
PA for vitamin B-12 1,000mcg tablet EXCLUDED from plan     Reason:    Message sent to office clinical pool Yes

## 2025-04-03 NOTE — TELEPHONE ENCOUNTER
Please notify patient she will also need to get Vitamin B12 OTC due to it is not covered by her insurance.

## 2025-04-17 ENCOUNTER — HOSPITAL ENCOUNTER (OUTPATIENT)
Dept: ULTRASOUND IMAGING | Facility: HOSPITAL | Age: 70
End: 2025-04-17
Payer: COMMERCIAL

## 2025-04-17 ENCOUNTER — HOSPITAL ENCOUNTER (OUTPATIENT)
Dept: CT IMAGING | Facility: HOSPITAL | Age: 70
End: 2025-04-17
Payer: COMMERCIAL

## 2025-04-17 DIAGNOSIS — E01.0 THYROMEGALY: ICD-10-CM

## 2025-04-17 DIAGNOSIS — Z72.0 TOBACCO USE: ICD-10-CM

## 2025-04-17 DIAGNOSIS — K11.8 MASS OF LEFT PAROTID GLAND: ICD-10-CM

## 2025-04-17 PROCEDURE — 76536 US EXAM OF HEAD AND NECK: CPT

## 2025-04-28 ENCOUNTER — RESULTS FOLLOW-UP (OUTPATIENT)
Dept: FAMILY MEDICINE CLINIC | Facility: CLINIC | Age: 70
End: 2025-04-28

## 2025-06-19 ENCOUNTER — TELEPHONE (OUTPATIENT)
Age: 70
End: 2025-06-19

## 2025-06-19 DIAGNOSIS — E78.00 PURE HYPERCHOLESTEROLEMIA: ICD-10-CM

## 2025-06-19 DIAGNOSIS — I10 ESSENTIAL HYPERTENSION: ICD-10-CM

## 2025-06-19 DIAGNOSIS — I49.5 SICK SINUS SYNDROME (HCC): ICD-10-CM

## 2025-06-19 RX ORDER — CHLORAL HYDRATE 500 MG
2000 CAPSULE ORAL 2 TIMES DAILY
Qty: 120 CAPSULE | Refills: 5 | Status: SHIPPED | OUTPATIENT
Start: 2025-06-19

## 2025-06-19 RX ORDER — METOPROLOL SUCCINATE 25 MG/1
25 TABLET, EXTENDED RELEASE ORAL DAILY
Qty: 90 TABLET | Refills: 1 | Status: SHIPPED | OUTPATIENT
Start: 2025-06-19

## 2025-06-19 NOTE — TELEPHONE ENCOUNTER
PA for Omega-3 Fatty Acids 1,000mg SUBMITTED to [employer]    via    []CMM-KEY:   [x]Surescripts  []Availity-Auth ID # NDC #   []Faxed to plan   []Other website   []Phone call Case ID #     [x]PA sent as URGENT    All office notes, labs and other pertaining documents and studies sent. Clinical questions answered. Awaiting determination from insurance company.     Turnaround time for your insurance to make a decision on your Prior Authorization can take 7-21 business days.

## 2025-06-19 NOTE — TELEPHONE ENCOUNTER
Reason for call:   [x] Refill   [] Prior Auth  [] Other:     Office:   [x] PCP/Provider - Patricia Hunter   [] Specialty/Provider -     Medication: metoprolol succinate (TOPROL-XL) 25 mg 24 hr tablet     Dose/Frequency: Take 1 tablet (25 mg total) by mouth daily     Quantity: 90    Medication: Omega-3 Fatty Acids (fish oil) 1,000 mg     Dose/Frequency: Take 2 capsules (2,000 mg total) by mouth 2 (two) times a day     Quantity: 120    Pharmacy: [facility]    Local Pharmacy   Does the patient have enough for 3 days?   [] Yes   [x] No - Send as HP to POD

## 2025-06-23 NOTE — TELEPHONE ENCOUNTER
PA for Omega-3 Fatty Acids 1,000mg EXCLUDED from plan       Reason:      Message sent to office clinical pool Yes